# Patient Record
Sex: FEMALE | Race: NATIVE HAWAIIAN OR OTHER PACIFIC ISLANDER | URBAN - METROPOLITAN AREA
[De-identification: names, ages, dates, MRNs, and addresses within clinical notes are randomized per-mention and may not be internally consistent; named-entity substitution may affect disease eponyms.]

---

## 2024-05-28 ENCOUNTER — TELEPHONE (OUTPATIENT)
Dept: SURGERY | Facility: CLINIC | Age: 74
End: 2024-05-28

## 2024-05-28 NOTE — TELEPHONE ENCOUNTER
Patient is needing an appointment to establish care as she will be moving from [place] to [place]. Patient currently sees urology out there and does have a nephrostomy tube which needs to be replaced every two months. Her next change of the tube needs to be before July 24,2024. Please advise.

## 2024-05-30 NOTE — TELEPHONE ENCOUNTER
This encounter is now closed.     RN called daughter. Coming from [place]. Here in [place] to establish care with Urologist. Not available until mid July. RN offered 7/15 Monday at 2:30 with Dr Tracy.     PCP, Dr Kaur   Urologist, Dr Treviño   IR, Dr Hardy  See CareEverywhere    Instructions/directions given. She agreed to plans.

## 2024-07-22 NOTE — PROGRESS NOTES
Urology Clinic Note - New Patient    Referring Provider:  No referring provider defined for this encounter.     Primary Care Provider:  No primary care provider on file.     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    Daughter translating per preference.     Patient with complicated history of vesicovaginal fistula following chemoradiation hysterectomy for cervical cancer in 2006.  In 2019 she had a small bowel obstruction and underwent an enterotomy and ileocecectomy and later this was converted to a colostomy.  She also followed closely with nephrology at that time for CKD.  There are minimal urology notes available.    There is a inpatient note from 11/2022 which states that patient had a total pelvic exenteration.  Urologist was Dr. Treviño in [place].  There was extensive prior radiation noted at the time.  Very large vesicovaginal fistula.  She therefore had cystectomy and a left to right trans ureteral ureterostomy and a right cutaneous ureterostomy due to lack of safe bowel or colon for conduit.  Appears LEFT ureter was brought to skin- RIGHT ureter was plugged into the mid LEFT ureter.   Per notes, due to very high risk for stricture the plan was to maintain stent long-term.  Initially Dr. Treviño was managing this with a ureteral stent that he would change in his office under fluoroscopy into the left kidney.  Additionally, she has an extensive history of the left atrophied kidney, this was noted before her above surgery.  At some point, decision was made to change to a right-sided nephrostomy tube.    Per daughter, initially stent was in left kidney.   She also states that the left side did not have a NT placed as it was thought that any pressure from  here would be relieved via the right side.     She had an episode of sepsis and March 2024.  It was due to nephrostomy tube malfunction.  Last changed 5/24/24; due every 3mo per notes. Last was uncomplicated her notes.   She had a Lasix renogram on 6-.  This showed  Differential Function:  Left Kidney: 34%  Right Kidney: 66%    T 1/2 for Lasix Clearance, kidney:  Left Kidney: 18 minutes  Right Kidney: 11 minutes    T 1/2 for Lasix Clearance, collecting system:  Left Kidney: 36.7 minutes  Right Kidney: 8 minutes    Last RBUS 6/6;   RIGHt- no hydronephrosis   LEFT- mild hydronephrosis (improved from prior)    I talked to Dr. Ferrer on 7/25;   He states that the decision was made to only maintain RIGHT NT as most urine would reflux up the UU and into the nephrostomy tube from the left side.   No output from the cutenous ureterostomy now.   Family wanted to avoid any larger reconsruction; wanted to keep with chronic NT.   No issues today. Nt draining well. No s/s of infection.               History:     CKD  Cervical cancer  HLD   Anemia     Surgeries as above    Never smoker     No pertinent family history     Social History     Socioeconomic History    Marital status: Unknown     Social Determinants of Health     Food Insecurity: Low Risk  (3/24/2024)    Received from [facility]    IP Discharge Planning Review     In the past 12 months have you experienced difficulty with any of the following?: No difficulties reported   Transportation Needs: Low Risk  (3/24/2024)    Received from [facility]    IP Discharge Planning Review     In the past 12 months have you experienced difficulty with any of the following?: No difficulties reported       Medications (Active prior to today's visit):  Current Outpatient Medications   Medication Sig Dispense Refill    Ferrous Sulfate 325 (65 Fe) MG Oral Tab Take 0.2 tablets (65 mg total) by mouth  daily with breakfast.      loperamide 2 MG Oral Cap       sodium bicarbonate 650 MG Oral Tab Take 1 tablet (650 mg total) by mouth 2 (two) times daily.      SF 5000 PLUS 1.1 % Dental Cream Apply a thin ribbon of paste to a toothbrush. Brush thoroughly once daily for 2 minutes, preferably at bedtime. Spit the paste out after use and do not eat, drink, or rinse for 30 minutes. Do not swallow the paste. Use regular toothpaste in the morning.      tiZANidine 2 MG Oral Tab Take 1-2 tablets (2-4 mg total) by mouth 2 (two) times daily as needed.      zolpidem 10 MG Oral Tab       ascorbic acid 1000 MG Oral Tab Take 1 tablet (1,000 mg total) by mouth daily.      Cholecalciferol (VITAMIN D-1000 MAX ST) 25 MCG (1000 UT) Oral Tab Take 25 mcg by mouth daily.      Coenzyme Q10 100 MG Oral Cap Take 300 mg by mouth daily.      omega-3 fatty acids 1000 MG Oral Cap Take 1,000 mg by mouth daily.         Allergies:  Allergies   Allergen Reactions    Famotidine NAUSEA AND VOMITING         Review of Systems:   A comprehensive 10-point review of systems was completed.  Pertinent positives and negatives are noted in the the HPI.    Physical Exam:   CONSTITUTIONAL: Well developed, well nourished, in no acute distress  NEUROLOGIC: Alert and oriented  HEAD: Normocephalic, atraumatic  EYES: Sclera non-icteric  ENT: Hearing intact, moist mucous membranes  NECK: No obvious goiter or masses  RESPIRATORY: Normal respiratory effort  SKIN: No evident rashes  ABDOMEN: Soft, non-tender, non-distended,    GENITOURINARY: right NT with clear urine    Assessment & Plan:   Anmol Gonzalez is a 74 year old female with history of CKD, atrophic L kidney, referred for hx of VV fistula sp complex reconstruction (cystectomy, cutaneous ureterostomy with trans U-U) and right hydronephrosis managed by nephrostomy tube.    Daughter translating per preference.     I talked to family and Dr. Treviño; had a complicated pelvic exant for fistula and diversion was not possible  due to poor bowel quality from radiation; had left ureter brought to skin, right ureter plugged into left ureter; very narrow ureter and sharp angle of anastomosis per Dr. Treviño. Has been managed by stent initially then with NT in right kidney.   Will upload imaging; wanted to obtain baseline imaging here but family wants to avoid repeat. It appears there is some residual left sided hydro in atrophic kidney- however this is likely mostly draining via right nephrostomy tube due to U-U.   Patient has history of sepsis and has issues with NT malfunction and therefore has q2-3 mo exchanges. She is overdue. Order placed. Will also obtain antegrade studies at that time.   For now family is not interested in more complex reconstruction. Will discuss further at follow up.       Thank you for this consult.    I have personally reviewed all relevant medical records, labs, and imaging.    In total, 60 minutes were spent on this patient encounter (including chart review, patient history, physical, and counseling, documentation, and communication).  Extensive review of outside records and discussion with outside provider.        Raymundo Mccormick MD  Staff Urologist  [facility]  Office: [phone]

## 2024-07-24 ENCOUNTER — TELEPHONE (OUTPATIENT)
Dept: SURGERY | Facility: CLINIC | Age: 74
End: 2024-07-24

## 2024-07-24 ENCOUNTER — OFFICE VISIT (OUTPATIENT)
Dept: SURGERY | Facility: CLINIC | Age: 74
End: 2024-07-24

## 2024-07-24 DIAGNOSIS — N13.30 HYDRONEPHROSIS, RIGHT: Primary | ICD-10-CM

## 2024-07-24 PROCEDURE — 99205 OFFICE O/P NEW HI 60 MIN: CPT | Performed by: UROLOGY

## 2024-07-24 RX ORDER — PSYLLIUM HUSK 0.4 G
25 CAPSULE ORAL DAILY
COMMUNITY

## 2024-07-24 RX ORDER — TIZANIDINE 2 MG/1
1-2 TABLET ORAL 2 TIMES DAILY PRN
COMMUNITY
Start: 2024-06-26

## 2024-07-24 RX ORDER — FERROUS SULFATE 325(65) MG
65 TABLET ORAL
COMMUNITY
Start: 2023-11-01

## 2024-07-24 RX ORDER — SODIUM BICARBONATE 650 MG/1
650 TABLET ORAL 2 TIMES DAILY
COMMUNITY
Start: 2024-05-15 | End: 2025-05-16

## 2024-07-24 RX ORDER — UBIDECARENONE 100 MG
300 CAPSULE ORAL DAILY
COMMUNITY

## 2024-07-24 RX ORDER — CHLORAL HYDRATE 500 MG
1000 CAPSULE ORAL DAILY
COMMUNITY

## 2024-07-24 RX ORDER — ZOLPIDEM TARTRATE 10 MG/1
TABLET ORAL
COMMUNITY
Start: 2024-04-28

## 2024-07-24 RX ORDER — 1.1% SODIUM FLUORIDE PRESCRIPTION DENTAL CREAM 5 MG/G
CREAM DENTAL
COMMUNITY
Start: 2024-06-03

## 2024-07-24 RX ORDER — LOPERAMIDE HYDROCHLORIDE 2 MG/1
CAPSULE ORAL
COMMUNITY
Start: 2024-03-27

## 2024-07-24 NOTE — TELEPHONE ENCOUNTER
Per Colby with [facility] returning a call from Dr. Mccormick. Spoke to Pricila and connected to Dr. Mccormick. Thank you

## 2024-07-30 ENCOUNTER — OFFICE VISIT (OUTPATIENT)
Dept: NEPHROLOGY | Facility: CLINIC | Age: 74
End: 2024-07-30
Payer: MEDICARE

## 2024-07-30 VITALS — DIASTOLIC BLOOD PRESSURE: 68 MMHG | WEIGHT: 100.25 LBS | SYSTOLIC BLOOD PRESSURE: 130 MMHG

## 2024-07-30 DIAGNOSIS — N18.30 STAGE 3 CHRONIC KIDNEY DISEASE, UNSPECIFIED WHETHER STAGE 3A OR 3B CKD (HCC): Primary | ICD-10-CM

## 2024-07-30 DIAGNOSIS — N13.9 OBSTRUCTIVE UROPATHY: ICD-10-CM

## 2024-07-30 PROCEDURE — 99205 OFFICE O/P NEW HI 60 MIN: CPT | Performed by: INTERNAL MEDICINE

## 2024-07-30 NOTE — PROGRESS NOTES
Nephrology Consult Note    REASON FOR CONSULT: CKD 3    ASSESSMENT/PLAN:      1) CKD 3- due to obstructive uropathy resulting from multiple surgeries after undergoing hysterectomy for cervical cancer in 2009 followed by XRT, fistula formation, bowel obstruction, ileostomy and subsequent colostomy, cystectomy and ultimately percutaneous nephrostomy tube placement in 2023 (with L ureter joined to R). Recent nuc med renogram with split function 65/35. SCr < 1 mg/dl prior to developing obstruction last yr. No other risk factors for kidney disease.  Meds are benign without chronic analgesic or PPI use.  There are no signs of other systemic process such as vasculitis or autoimmune disease.  There is no family history of kidney disease.  She does not have HIV or hepatitis risk factors.  Her urine sediment is bland does not support an underlying glomerulopathy. PLAN- d/w pt / daughter at length. No further w/u indicated; no absolute indication for ACE-I / ARB. To focus on timely PNT exchange- currently q2 months (Dr. Mccormick + IR). Will check labs monthly for now and f/u in 6 months.    2) HTN- borderline HTN SBP 130s/70; no absolute indication for tx.    3) Hyperlipidemia- consider tx with statin- defer to PCP    4) Cervical CA s/p LINDSEY / XRT / chemo and complications as above    5) + colostomy + R PNT       HPI:   Anmol Gonzalez is a 74 year old female with   Chief Complaint   Patient presents with    Chronic Kidney Disease     Left atrophic kidney, right nephrostomy     No primary care provider on file.    Very pleasant 74-year-old female with a complex medical history having undergone hysterectomy, radiation and chemotherapy for cervical cancer diagnosed in 2009; this is complicated by multiple issues as noted above.  Was found to have obstructive uropathy last year after developing worsening kidney function which improved after percutaneous nephrostomy tube placement.    ROS:    Denies fever/chills  Denies wt  loss/gain  Denies HA or visual changes  Denies CP or palpitations  Denies SOB/cough/hemoptysis  Denies abd or flank pain  Denies N/V/D  Denies change in urinary habits or gross hematuria  Denies LE edema  Denies skin rashes/myalgias/arthralgias    PMH:  No past medical history on file.    PSH:  No past surgical history on file.    Medications (Active prior to today's visit):  Current Outpatient Medications   Medication Sig Dispense Refill    ascorbic acid 1000 MG Oral Tab Take 1 tablet (1,000 mg total) by mouth daily.      Cholecalciferol (VITAMIN D-1000 MAX ST) 25 MCG (1000 UT) Oral Tab Take 25 mcg by mouth daily.      Coenzyme Q10 100 MG Oral Cap Take 300 mg by mouth daily.      Ferrous Sulfate 325 (65 Fe) MG Oral Tab Take 0.2 tablets (65 mg total) by mouth daily with breakfast.      loperamide 2 MG Oral Cap       omega-3 fatty acids 1000 MG Oral Cap Take 1,000 mg by mouth daily.      sodium bicarbonate 650 MG Oral Tab Take 1 tablet (650 mg total) by mouth 2 (two) times daily.      SF 5000 PLUS 1.1 % Dental Cream Apply a thin ribbon of paste to a toothbrush. Brush thoroughly once daily for 2 minutes, preferably at bedtime. Spit the paste out after use and do not eat, drink, or rinse for 30 minutes. Do not swallow the paste. Use regular toothpaste in the morning.      tiZANidine 2 MG Oral Tab Take 1-2 tablets (2-4 mg total) by mouth 2 (two) times daily as needed.      zolpidem 10 MG Oral Tab          Allergies:  Allergies   Allergen Reactions    Famotidine NAUSEA AND VOMITING       Social History:  Social History     Socioeconomic History    Marital status: Unknown   Tobacco Use    Smoking status: Never    Smokeless tobacco: Never        Family History:  Denies family history of kidney disease.    PHYSICAL EXAM:   /68   Wt 100 lb 4 oz (45.5 kg)    Wt Readings from Last 3 Encounters:   07/30/24 100 lb 4 oz (45.5 kg)     General: Alert and oriented in no apparent distress.  HEENT: No scleral icterus,  MMM  Neck: Supple, no TRANG or thyromegaly  Cardiac: Regular rate and rhythm, S1, S2 normal, no murmur or rub  Lungs: Clear without wheezes, rales, rhonchi.    Abdomen: Soft, non-tender. + bowel sounds, no palpable organomegaly  Extremities: Without clubbing, cyanosis or edema.  Neurologic:  normal affect, cranial nerves grossly intact, moving all extremities  Skin: Warm and dry, no rashes        Leonela Kay MD  7/30/2024  4:22 PM

## 2024-07-31 VITALS — HEIGHT: 61 IN | WEIGHT: 100 LBS | BODY MASS INDEX: 18.88 KG/M2

## 2024-07-31 NOTE — PAT NURSING NOTE
PreOp Instructions     You are scheduled for: an Interventional Radiology Procedure     Date of Procedure: 08/08/24     Diet Instructions: Do not eat or drink anything after midnight     Medications: Medications you are allowed to take can be taken with a sip of water the morning of your procedure     Medications to Stop: Hold herbal supplements and vitamins for 5 days     Skin Prep: Shower with antibacterial soap using a clean washcloth, prior to procedure     Driving After Procedure: If sedation is given, you WILL NOT be able to drive home. You will need a responsible adult  to drive you home.     Discharge Teaching: Your nurse will give you specific instructions before discharge, Most people can resume normal activities in 2-3 days, Any questions, please call the physician's office

## 2024-08-08 ENCOUNTER — HOSPITAL ENCOUNTER (OUTPATIENT)
Dept: INTERVENTIONAL RADIOLOGY/VASCULAR | Facility: HOSPITAL | Age: 74
Discharge: HOME | End: 2024-08-08
Attending: UROLOGY
Payer: MEDICARE

## 2024-08-14 ENCOUNTER — HOSPITAL ENCOUNTER (OUTPATIENT)
Dept: INTERVENTIONAL RADIOLOGY/VASCULAR | Facility: HOSPITAL | Age: 74
Discharge: HOME | End: 2024-08-14
Attending: UROLOGY | Admitting: UROLOGY
Payer: MEDICARE

## 2024-08-14 VITALS
RESPIRATION RATE: 10 BRPM | SYSTOLIC BLOOD PRESSURE: 133 MMHG | DIASTOLIC BLOOD PRESSURE: 62 MMHG | TEMPERATURE: 97 F | HEART RATE: 65 BPM | OXYGEN SATURATION: 92 %

## 2024-08-14 DIAGNOSIS — N13.30 HYDRONEPHROSIS, RIGHT: ICD-10-CM

## 2024-08-14 LAB
ANION GAP SERPL CALC-SCNC: 4 MMOL/L (ref 0–18)
BUN BLD-MCNC: 28 MG/DL (ref 9–23)
CALCIUM BLD-MCNC: 9.9 MG/DL (ref 8.7–10.4)
CHLORIDE SERPL-SCNC: 108 MMOL/L (ref 98–112)
CO2 SERPL-SCNC: 26 MMOL/L (ref 21–32)
CREAT BLD-MCNC: 1.42 MG/DL
EGFRCR SERPLBLD CKD-EPI 2021: 39 ML/MIN/1.73M2 (ref 60–?)
GLUCOSE BLD-MCNC: 79 MG/DL (ref 70–99)
INR: 1 (ref 0.8–1.3)
OSMOLALITY SERPL CALC.SUM OF ELEC: 290 MOSM/KG (ref 275–295)
POTASSIUM SERPL-SCNC: 4.6 MMOL/L (ref 3.5–5.1)
SODIUM SERPL-SCNC: 138 MMOL/L (ref 136–145)

## 2024-08-14 PROCEDURE — 50435 EXCHANGE NEPHROSTOMY CATH: CPT | Performed by: RADIOLOGY

## 2024-08-14 PROCEDURE — 85610 PROTHROMBIN TIME: CPT | Performed by: RADIOLOGY

## 2024-08-14 PROCEDURE — 0T25X0Z CHANGE DRAINAGE DEVICE IN KIDNEY, EXTERNAL APPROACH: ICD-10-PCS | Performed by: RADIOLOGY

## 2024-08-14 PROCEDURE — 99152 MOD SED SAME PHYS/QHP 5/>YRS: CPT | Performed by: RADIOLOGY

## 2024-08-14 PROCEDURE — 80048 BASIC METABOLIC PNL TOTAL CA: CPT | Performed by: INTERNAL MEDICINE

## 2024-08-14 PROCEDURE — 50432 PLMT NEPHROSTOMY CATHETER: CPT | Performed by: RADIOLOGY

## 2024-08-14 RX ORDER — SODIUM CHLORIDE 9 MG/ML
INJECTION, SOLUTION INTRAVENOUS CONTINUOUS
Status: DISCONTINUED | OUTPATIENT
Start: 2024-08-14 | End: 2024-08-14

## 2024-08-14 RX ORDER — LEVOFLOXACIN 5 MG/ML
INJECTION, SOLUTION INTRAVENOUS
Status: COMPLETED
Start: 2024-08-14 | End: 2024-08-14

## 2024-08-14 RX ORDER — MIDAZOLAM HYDROCHLORIDE 1 MG/ML
INJECTION INTRAMUSCULAR; INTRAVENOUS
Status: DISCONTINUED
Start: 2024-08-14 | End: 2024-08-14 | Stop reason: WASHOUT

## 2024-08-14 RX ORDER — LIDOCAINE HYDROCHLORIDE 10 MG/ML
INJECTION, SOLUTION INFILTRATION; PERINEURAL
Status: COMPLETED
Start: 2024-08-14 | End: 2024-08-14

## 2024-08-14 NOTE — DISCHARGE INSTRUCTIONS
No shower for 3 days   On day 4 may sponge bathe or cover with plastic wrap and tape in place and then shower.  Monitor for any sign of infection : redness,fever, increased pain at puncture site.  Call  Office as directed.

## 2024-08-14 NOTE — PROGRESS NOTES
Pt arrived from IR lab. See vitals. Dressing c/d/I soft  1215 pt tolerated po well. Vss.discharge instructions reviewed. Dressing remains c/d/I neph tube draining cl yellow urine. IV d.cd and pt discharged at approx 1235 to home via wheelchair with daughter.

## 2024-08-14 NOTE — PROCEDURES
Scheduled exchange.  Prior care elsewhere.  Daughter reports problems with obstruction in past, with exchange every 2 months.  New 10F PNT.  Comp-none.  Mild sediment on catheter.

## 2024-08-15 NOTE — H&P
[facility]   part of [facility]   History & Physical    Anmol Gonzalez Patient Status:  Outpatient in a Bed    2/15/1950 MRN [id number]   Location [facility] INTERVENTIONAL SUITES Attending No att. providers found   Hosp Day # 0 PCP No primary care provider on file.     Admitting Diagnosis:   Referral for routine PNT exchange.    History of Present Illness:   Obstructive uropathy managed with chronic PNT.    History   Past Medical History:  Past Medical History:    Cancer (HCC)    cervicle    CKD (chronic kidney disease)       Past Surgical History:  Past Surgical History:   Procedure Laterality Date    Other surgical history      BLAADER REMOVAL    Part removal colon w end colostomy         Social History:  Social History     Tobacco Use    Smoking status: Never    Smokeless tobacco: Never   Substance Use Topics    Alcohol use: Never        Family History:  No family history on file.    Allergies/Medications:   Allergies:  Allergies   Allergen Reactions    Famotidine NAUSEA AND VOMITING       Medications:    Current Outpatient Medications:     ascorbic acid 1000 MG Oral Tab, Take 1 tablet (1,000 mg total) by mouth daily., Disp: , Rfl:     Cholecalciferol (VITAMIN D-1000 MAX ST) 25 MCG (1000 UT) Oral Tab, Take 25 mcg by mouth daily., Disp: , Rfl:     Coenzyme Q10 100 MG Oral Cap, Take 300 mg by mouth daily., Disp: , Rfl:     Ferrous Sulfate 325 (65 Fe) MG Oral Tab, Take 0.2 tablets (65 mg total) by mouth daily with breakfast., Disp: , Rfl:     omega-3 fatty acids 1000 MG Oral Cap, Take 1,000 mg by mouth daily., Disp: , Rfl:     sodium bicarbonate 650 MG Oral Tab, Take 1 tablet (650 mg total) by mouth 2 (two) times daily., Disp: , Rfl:     zolpidem 10 MG Oral Tab, , Disp: , Rfl:     loperamide 2 MG Oral Cap, , Disp: , Rfl:     SF 5000 PLUS 1.1 % Dental Cream, Apply a thin ribbon of paste to a toothbrush. Brush thoroughly once daily for 2 minutes, preferably at bedtime. Spit the paste out after use and do  not eat, drink, or rinse for 30 minutes. Do not swallow the paste. Use regular toothpaste in the morning., Disp: , Rfl:     tiZANidine 2 MG Oral Tab, Take 1-2 tablets (2-4 mg total) by mouth 2 (two) times daily as needed., Disp: , Rfl:     Physical Exam & Review of Systems:   Physical Exam:    /62   Pulse 65   Temp 97 °F (36.1 °C)   Resp 10   SpO2 92%     General: NAD  Neck: No JVD  Lungs: CTA bilat  Heart: RRR, S1, S2  Abdomen: Soft, NT/ND, BS+x4  Extremities: Warm, dry, no LE edema bilat  Pulses: 2+ bilat DP    Results:   Labs:  No results for input(s): \"RBC\", \"HGB\", \"HCT\", \"MCV\", \"MCH\", \"MCHC\", \"RDW\", \"NEPRELIM\", \"WBC\", \"PLT\" in the last 168 hours.  Recent Labs   Lab 08/14/24  1022   INR 1.0     Recent Labs   Lab 08/14/24  1000   GLU 79   BUN 28*   CREATSERUM 1.42*   CA 9.9      K 4.6      CO2 26.0       Assessment/Plan:   Impression: Referral for PNT exchange.     Recommendations: Outpatient procedure.    Byron Brian MD  8/15/2024  8:32 AM

## 2024-08-25 NOTE — PROGRESS NOTES
Urology Clinic Note - New Patient    Referring Provider:  No referring provider defined for this encounter.     Primary Care Provider:  No primary care provider on file.     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    Daughter translating per preference.     Patient with complicated history of vesicovaginal fistula following chemoradiation hysterectomy for cervical cancer in 2006.  In 2019 she had a small bowel obstruction and underwent an enterotomy and ileocecectomy and later this was converted to a colostomy.  She also followed closely with nephrology at that time for CKD.  There are minimal urology notes available.    There is a inpatient note from 11/2022 which states that patient had a total pelvic exenteration.  Urologist was Dr. Treviño in [place].  There was extensive prior radiation noted at the time.  Very large vesicovaginal fistula.  She therefore had cystectomy and a left to right trans ureteral ureterostomy and a right cutaneous ureterostomy due to lack of safe bowel or colon for conduit.  Appears LEFT ureter was brought to skin- RIGHT ureter was plugged into the mid LEFT ureter.   Per notes, due to very high risk for stricture the plan was to maintain stent long-term.  Initially Dr. Treviño was managing this with a ureteral stent that he would change in his office under fluoroscopy into the left kidney.  Additionally, she has an extensive history of the left atrophied kidney, this was noted before her above surgery.  At some point, decision was made to change to a right-sided nephrostomy tube.    Per daughter, initially stent was in left kidney.   She also states that the left side did not have a NT placed as it was thought that any pressure from  here would be relieved via the right side.     She had an episode of sepsis and March 2024.  It was due to nephrostomy tube malfunction.  Last changed 5/24/24; due every 3mo per notes. Last was uncomplicated her notes.   She had a Lasix renogram on 6-.  This showed  Differential Function:  Left Kidney: 34%  Right Kidney: 66%    T 1/2 for Lasix Clearance, kidney:  Left Kidney: 18 minutes  Right Kidney: 11 minutes    T 1/2 for Lasix Clearance, collecting system:  Left Kidney: 36.7 minutes  Right Kidney: 8 minutes    Last RBUS 6/6;   RIGHt- no hydronephrosis   LEFT- mild hydronephrosis (improved from prior)    I talked to Dr. Ferrer on 7/25;   He states that the decision was made to only maintain RIGHT NT as most urine would reflux up the UU and into the nephrostomy tube from the left side.   He states the UU was very tight,  cutaneous ureteroscopy was very narrow as well. He does believe that a re-do procedure would be possible in the future.   No output from the cutenous ureterostomy now.   Family wanted to avoid any larger reconstruction; wanted to keep with chronic NT.     At visit 7/24/24;   Decision made to replace the NT and perform antegrade studies ;     Is sp IR Xc on 8/14;     A gentle hand syringe nephrostogram was performed.  This demonstrated the existing    percutaneous nephrostomy 2 to be in satisfactory position, with the pigtail portion at the level of the renal pelvis.  Mild distension of the collecting system.   Subsequent over-the-wire exchange for the same size nephrostomy tube, 10 Latvian.  Confirmation of position with positive contrast, pigtail at renal pelvis level.  The system was secured at the skin exit site with 2 0 Ethilon suture.  It was connected to   a gravity bag.  Sterile dressings were placed.  Note made of mild sediment on the catheter.         She is doing well today. Seeing Dr Kay for CKD- labs all stable recently.          History:     CKD  Cervical cancer  HLD   Anemia      Surgeries as above    Never smoker     No pertinent family history     Social History     Socioeconomic History    Marital status: Unknown   Tobacco Use    Smoking status: Never    Smokeless tobacco: Never   Substance and Sexual Activity    Alcohol use: Never    Drug use: Never     Social Determinants of Health     Food Insecurity: Low Risk  (3/24/2024)    Received from [facility]    IP Discharge Planning Review     In the past 12 months have you experienced difficulty with any of the following?: No difficulties reported   Transportation Needs: Low Risk  (3/24/2024)    Received from [facility]    IP Discharge Planning Review     In the past 12 months have you experienced difficulty with any of the following?: No difficulties reported       Medications (Active prior to today's visit):  Current Outpatient Medications   Medication Sig Dispense Refill    ascorbic acid 1000 MG Oral Tab Take 1 tablet (1,000 mg total) by mouth daily.      Cholecalciferol (VITAMIN D-1000 MAX ST) 25 MCG (1000 UT) Oral Tab Take 25 mcg by mouth daily.      Coenzyme Q10 100 MG Oral Cap Take 300 mg by mouth daily.      Ferrous Sulfate 325 (65 Fe) MG Oral Tab Take 0.2 tablets (65 mg total) by mouth daily with breakfast.      loperamide 2 MG Oral Cap       omega-3 fatty acids 1000 MG Oral Cap Take 1,000 mg by mouth daily.      sodium bicarbonate 650 MG Oral Tab Take 1 tablet (650 mg total) by mouth 2 (two) times daily.      SF 5000 PLUS 1.1 % Dental Cream Apply a thin ribbon of paste to a toothbrush. Brush thoroughly once daily for 2 minutes, preferably at bedtime. Spit the paste out after use and do not eat, drink, or rinse for 30 minutes. Do not swallow the paste. Use regular toothpaste in the morning.      tiZANidine 2 MG Oral Tab Take 1-2 tablets (2-4 mg total) by mouth 2 (two) times daily as needed.      zolpidem 10 MG Oral Tab           Allergies:  Allergies   Allergen Reactions    Famotidine NAUSEA AND VOMITING         Review of Systems:   A comprehensive 10-point review of systems was completed.  Pertinent positives and negatives are noted in the the HPI.    Physical Exam:   CONSTITUTIONAL: Well developed, well nourished, in no acute distress  NEUROLOGIC: Alert and oriented  HEAD: Normocephalic, atraumatic  EYES: Sclera non-icteric  ENT: Hearing intact, moist mucous membranes  NECK: No obvious goiter or masses  RESPIRATORY: Normal respiratory effort  SKIN: No evident rashes  ABDOMEN: Soft, non-tender, non-distended,    GENITOURINARY: right NT with clear urine; previous ureterostomy site completely closed and scarred.    Assessment & Plan:   Anmol Gonzalez is a 74 year old female with history of CKD, atrophic L kidney, referred for hx of VV fistula sp complex reconstruction (cystectomy, cutaneous ureterostomy with trans U-U) and right hydronephrosis managed by nephrostomy tube.    No sp NT change with IR 2 weeks ago.  Daughter translating per preference.     I previously talked to Dr. Treviño; had a complicated pelvic exant for fistula and diversion was not possible due to poor bowel quality from radiation; had left ureter brought to skin, right ureter plugged into left ureter; very narrow ureter and sharp angle of anastomosis per Dr. Treviño. Has been managed by stent initially then with NT in right kidney.   I attempted to view imaging from disc that was brought in, was unable to view.  We discussed obtaining baseline imaging, given recent imaging at outside hospital family wants to avoid this for now.  Patient did have nephrostomy tube change recently with IR, there were some small amount of collecting system distention, no overt hydronephrosis on my review.  She is to continue Q 2 to 3-month exchanges given history of neph tube malfunction.  This is already being managed by IR.  Discussed options in detail.  I did discuss the option to see a  reconstructive urologist for consideration of revising surgery.  After detailed discussion patient and family are not interested in any kind of big surgeries at this time given her complicated history as above.  Continue follow-up with nephrology.  Return to see me in about 6 months, sooner if any issues.        Thank you for this consult.    I have personally reviewed all relevant medical records, labs, and imaging.    In total, 30 minutes were spent on this patient encounter (including chart review, patient history, physical, and counseling, documentation, and communication).        Raymundo Mccormick MD  Staff Urologist  [facility]  Office: [phone]

## 2024-08-26 ENCOUNTER — OFFICE VISIT (OUTPATIENT)
Dept: SURGERY | Facility: CLINIC | Age: 74
End: 2024-08-26

## 2024-08-26 DIAGNOSIS — N13.30 HYDRONEPHROSIS, RIGHT: Primary | ICD-10-CM

## 2024-08-26 PROCEDURE — 99214 OFFICE O/P EST MOD 30 MIN: CPT | Performed by: UROLOGY

## 2024-10-07 ENCOUNTER — OFFICE VISIT (OUTPATIENT)
Dept: INTERNAL MEDICINE CLINIC | Facility: CLINIC | Age: 74
End: 2024-10-07
Payer: MEDICARE

## 2024-10-07 VITALS
BODY MASS INDEX: 18.9 KG/M2 | WEIGHT: 98.81 LBS | HEIGHT: 60.5 IN | RESPIRATION RATE: 12 BRPM | TEMPERATURE: 98 F | DIASTOLIC BLOOD PRESSURE: 60 MMHG | HEART RATE: 64 BPM | SYSTOLIC BLOOD PRESSURE: 118 MMHG

## 2024-10-07 DIAGNOSIS — E78.5 HYPERLIPIDEMIA, UNSPECIFIED HYPERLIPIDEMIA TYPE: ICD-10-CM

## 2024-10-07 DIAGNOSIS — N13.30 HYDRONEPHROSIS, RIGHT: ICD-10-CM

## 2024-10-07 DIAGNOSIS — E78.41 ELEVATED LIPOPROTEIN A LEVEL: ICD-10-CM

## 2024-10-07 DIAGNOSIS — G47.00 INSOMNIA, UNSPECIFIED TYPE: ICD-10-CM

## 2024-10-07 DIAGNOSIS — R19.7 FREQUENT DIARRHEA: ICD-10-CM

## 2024-10-07 DIAGNOSIS — Z93.3 COLOSTOMY IN PLACE (HCC): ICD-10-CM

## 2024-10-07 DIAGNOSIS — N18.31 STAGE 3A CHRONIC KIDNEY DISEASE (HCC): ICD-10-CM

## 2024-10-07 DIAGNOSIS — R01.1 HEART MURMUR: ICD-10-CM

## 2024-10-07 DIAGNOSIS — R03.0 ELEVATED BLOOD PRESSURE READING: ICD-10-CM

## 2024-10-07 PROCEDURE — 99205 OFFICE O/P NEW HI 60 MIN: CPT | Performed by: INTERNAL MEDICINE

## 2024-10-07 RX ORDER — ZOLPIDEM TARTRATE 6.25 MG/1
6.25 TABLET, FILM COATED, EXTENDED RELEASE ORAL NIGHTLY PRN
Qty: 90 TABLET | Refills: 0 | Status: SHIPPED | OUTPATIENT
Start: 2024-10-07

## 2024-10-07 NOTE — PROGRESS NOTES
[facility]    CHIEF COMPLAINT:    Chief Complaint   Patient presents with    Other     Recently moved here 7/24. Discuss Elevated lipoprotein.   Also C/O frequent diarrhea several times per week. Has not established with GI.  Has colostomy and nephrostomy.          HISTORY OF PRESENT ILLNESS:  The patient is a 74 year old year old female who presents to establish care.   She has an extensive medical history.     Had cervical cancer in 2006. Had chemo and xrt at that time.   In 2019 she had sbo leading to enterotomy and ileocecectomy. She has a colostomy bag now.   She had an atrophic left kidney and has a history of vesicovaginal fistula s/p complex reconstruction and right hydronephrosis managed by nephrostomy tube. She sees urology Dr. Raymundo Mccormick for this. Tube needs q2-3 month exchange as she has had it malfunction in the past. This malfunction caused sepsis in 3/2024. The exchanges are done by IR.   Daughter is not sure where she can get nephrostomy supplies from.     She has ckd. She sees dr. Kay for this.     Hyperlipidemia: had been on a statin   She has elevated lipoprotein A and had an LDL of 133 so she was started on a statin. Her lipoprotein A level increased with that and she was taken off the statin. Also her LFTs were elevated after starting the statin, mild to moderate elevation. She is not currently taking the statin.     Bp had been elevated but she has been controlling it. Currently has been up and down per pt. Does not have readings with her. Not sure if her cuff is accurate.     Insomnia: she is on ambien 10mg at bedtime and also on tizanidine at bedtime.      She has had frequent diarrhea into her colostomy over the past few weeks. Takes loperamide but still with symptoms.     Recent labs reviewed.   Cbc with elevated mcv. No anemia.   Cmp with elevated creatinine and mildly elevated LFTs. This is when she was on the statin.       Past Medical History:   Past Medical History:     Cancer (HCC)    cervicle    CKD (chronic kidney disease)        Past Surgical History:   Past Surgical History:   Procedure Laterality Date    Other surgical history      BLAADER REMOVAL    Part removal colon w end colostomy         Current Medications:    Current Outpatient Medications   Medication Sig Dispense Refill    Ascorbic Acid (VITAMIN C OR) Take by mouth daily.      Zolpidem Tartrate ER (AMBIEN CR) 6.25 MG Oral Tab CR Take 1 tablet (6.25 mg total) by mouth nightly as needed for Sleep. 90 tablet 0    Coenzyme Q10 100 MG Oral Cap Take 300 mg by mouth daily.      Ferrous Sulfate 325 (65 Fe) MG Oral Tab Take 1 tablet (325 mg total) by mouth daily with breakfast.      loperamide 2 MG Oral Cap Take 1 capsule (2 mg total) by mouth as needed.      omega-3 fatty acids 1000 MG Oral Cap Take 1,000 mg by mouth daily.      sodium bicarbonate 650 MG Oral Tab Take 1 tablet (650 mg total) by mouth 2 (two) times daily.      SF 5000 PLUS 1.1 % Dental Cream Apply a thin ribbon of paste to a toothbrush. Brush thoroughly once daily for 2 minutes, preferably at bedtime. Spit the paste out after use and do not eat, drink, or rinse for 30 minutes. Do not swallow the paste. Use regular toothpaste in the morning.      tiZANidine 2 MG Oral Tab Take 1-2 tablets (2-4 mg total) by mouth 2 (two) times daily as needed.      zolpidem 10 MG Oral Tab Take 1 tablet (10 mg total) by mouth nightly.           Allergies:    Allergies as of 10/07/2024 - Review Complete 10/07/2024   Allergen Reaction Noted    Famotidine NAUSEA AND VOMITING 05/21/2019       SOCIAL HISTORY:    Social History     Socioeconomic History    Marital status: Unknown     Spouse name: Not on file    Number of children: Not on file    Years of education: Not on file    Highest education level: Not on file   Occupational History    Not on file   Tobacco Use    Smoking status: Never    Smokeless tobacco: Never   Vaping Use    Vaping status: Never Used   Substance and Sexual  Activity    Alcohol use: Never    Drug use: Never    Sexual activity: Not on file   Other Topics Concern    Not on file   Social History Narrative    Not on file     Social Determinants of Health     Financial Resource Strain: Not on file   Food Insecurity: Low Risk  (3/24/2024)    Received from [facility]    IP Discharge Planning Review     In the past 12 months have you experienced difficulty with any of the following?: No difficulties reported   Transportation Needs: Low Risk  (3/24/2024)    Received from [facility]    IP Discharge Planning Review     In the past 12 months have you experienced difficulty with any of the following?: No difficulties reported   Physical Activity: Not on file   Stress: Not on file   Social Connections: Not on file   Housing Stability: Not on file       FAMILY HISTORY:   Family History   Problem Relation Age of Onset    Stroke Father     Stroke Mother        REVIEW OF SYSTEMS:  GENERAL: feels well otherwise  SKIN: denies any unusual skin lesions  EYES:denies blurred vision or double vision  HEENT: denies nasal congestion, sinus pain or ST  LUNGS: denies shortness of breath with exertion  CARDIOVASCULAR: denies chest pain on exertion  GI: denies abdominal pain,denies heartburn  : denies dysuria, vaginal discharge or itching  MUSCULOSKELETAL: denies back pain  NEURO: denies headaches  PSYCHE: denies depression or anxiety  HEMATOLOGIC: denies hx of anemia  ENDOCRINE: denies thyroid history  ALL/ASTHMA: denies hx of allergy or asthma      PAIN ASSESSMENT (Patient reports Pain):No       FALL ASSESSMENT:    Falls in the last 12 months: No    FUNCTIONAL ASSESSMENT (Able to perform all ADLs):  Yes    BODY HABITUS AND NUTRITION STATUS:  Body mass index is 18.98 kg/m².    DEPRESSION ASSESSMENT:  denies depression    PHYSICAL EXAM:   /60 (BP Location: Left arm, Patient Position: Sitting, Cuff Size:  adult)   Pulse 64   Temp 97.7 °F (36.5 °C) (Temporal)   Resp 12   Ht 5' 0.5\" (1.537 m)   Wt 98 lb 12.8 oz (44.8 kg)   Breastfeeding No   BMI 18.98 kg/m²  Body mass index is 18.98 kg/m².   GENERAL: well developed, well nourished,in no apparent distress  HEENT: Oropharynx normal. No tonsillar enlargement or exudates.   EYES:perrla  NECK: no lad  LUNGS: clear to auscultation  CARDIO: normal s1 and s2. Had a III/VI loreto at the apex.   GI: colostomy in place. No abdominal discomfort.   MUSCULOSKELETAL:  no edema, muscle strength normal.   Back: nephrostomy present.     Labs:   No results found for: \"WBC\", \"HGB\", \"PLT\"   Lab Results   Component Value Date/Time    GLU 79 08/14/2024 10:00 AM     08/14/2024 10:00 AM    K 4.6 08/14/2024 10:00 AM     08/14/2024 10:00 AM    CO2 26.0 08/14/2024 10:00 AM    CREATSERUM 1.42 (H) 08/14/2024 10:00 AM    CA 9.9 08/14/2024 10:00 AM        No results found for: \"CHOLEST\", \"HDL\", \"TRIG\", \"LDL\", \"NONHDLC\"    No results found for: \"A1C\"   Vitamin D:    No results found for: \"VITD\"          ASSESSMENT AND PLAN:   1. Hydronephrosis, right  Pt with nephrostomy.   follow up with urology. She will need to check with urology re nephrostomy supplies.     2. Colostomy in place (HCC)  Will refer to surgeon to manage.   - Surgery Referral - In Network    3. Stage 3a chronic kidney disease (HCC)  follow up with dr. Kay.     4. Elevated lipoprotein A level  Recheck.   Currently off statin.   Given info for heart scan.   She had a negative stress test last year.   No active cardiac symptoms.   - Lipoprotein (A); Future    5. Hyperlipidemia, unspecified hyperlipidemia type  Not on statin.   Do labs.   She had mildly elevated LFTs after starting the statin.   - Comp Metabolic Panel (14) [E]; Future  - Lipid Panel [E]; Future    6. Elevated blood pressure reading  Not elevated here today.   She should bring in her bp cuff (daughter will bring it to her appt) and we can calibrate. If  accurate she should check bp at home for 2 weeks and send me readings.     7. Insomnia, unspecified type  Discussed she is on too much ambien and also on tizanidine at the same time. High risk meds and risk of falls is high.   Will change to zolpidem er at a lower dose.   Refer to sleep specialist for other treatments.   Advise not to take the tizanidine at the same time but she states she has been doing this for year and has not fallen and does not feel unsteady.   - Zolpidem Tartrate ER (AMBIEN CR) 6.25 MG Oral Tab CR; Take 1 tablet (6.25 mg total) by mouth nightly as needed for Sleep.  Dispense: 90 tablet; Refill: 0  - Pulmonary Referral - In Network    8. Frequent diarrhea  Refer to gi.   - EVALUATE & TREAT, GASTRO (INTERNAL)    9. Heart murmur  Echo was done. Reviewed. She had some right sided changes including RA dilatation, tricuspid regurg, elevated pulm pressures.   Chest xray in 3/2024 was abnormal. Will recheck.   Refer to cardiology for evaluation.  - XR CHEST PA + LAT CHEST (CPT=71046); Future  - [facility] CARDIOLOGY EXTERNAL        Return in about 1 month (around 11/7/2024) for follow up.    Extensive chart review done for visit on day of the visit.     60 minutes spent on provision of medical services today, including chart review, obtaining and reviewing history, performing medically appropriate examination, counseling and educating patient and/or caregiver, ordering testing , medications, referrals or procedures, my independent interpretation of results, communication of results to patient and /or caregiver, care coordination, and documentation of all of the above.       Jess Esquivel MD

## 2024-10-08 PROBLEM — N18.31 STAGE 3A CHRONIC KIDNEY DISEASE (HCC): Status: ACTIVE | Noted: 2024-10-08

## 2024-10-08 PROBLEM — G47.00 INSOMNIA: Status: ACTIVE | Noted: 2024-10-08

## 2024-10-08 PROBLEM — Z93.3 COLOSTOMY IN PLACE (HCC): Status: ACTIVE | Noted: 2024-10-08

## 2024-10-08 PROBLEM — R01.1 HEART MURMUR: Status: ACTIVE | Noted: 2024-10-08

## 2024-10-08 PROBLEM — R19.7 FREQUENT DIARRHEA: Status: ACTIVE | Noted: 2024-10-08

## 2024-10-08 PROBLEM — E78.41 ELEVATED LIPOPROTEIN A LEVEL: Status: ACTIVE | Noted: 2024-10-08

## 2024-10-08 PROBLEM — R03.0 ELEVATED BLOOD PRESSURE READING: Status: ACTIVE | Noted: 2024-10-08

## 2024-10-08 PROBLEM — N13.30 HYDRONEPHROSIS, RIGHT: Status: ACTIVE | Noted: 2024-10-08

## 2024-10-08 PROBLEM — E78.5 HYPERLIPIDEMIA: Status: ACTIVE | Noted: 2024-10-08

## 2024-10-09 ENCOUNTER — HOSPITAL ENCOUNTER (OUTPATIENT)
Dept: CT IMAGING | Age: 74
Discharge: HOME | End: 2024-10-09
Attending: INTERNAL MEDICINE

## 2024-10-09 DIAGNOSIS — R01.1 HEART MURMUR: ICD-10-CM

## 2024-10-09 DIAGNOSIS — Z13.6 SCREENING FOR CARDIOVASCULAR CONDITION: ICD-10-CM

## 2024-10-16 ENCOUNTER — LAB ENCOUNTER (OUTPATIENT)
Dept: LAB | Age: 74
End: 2024-10-16
Attending: INTERNAL MEDICINE
Payer: MEDICARE

## 2024-10-16 DIAGNOSIS — E78.5 HYPERLIPIDEMIA, UNSPECIFIED HYPERLIPIDEMIA TYPE: ICD-10-CM

## 2024-10-16 DIAGNOSIS — E78.41 ELEVATED LIPOPROTEIN A LEVEL: ICD-10-CM

## 2024-10-16 LAB
ALBUMIN SERPL-MCNC: 4.9 G/DL (ref 3.2–4.8)
ALBUMIN/GLOB SERPL: 1.8 {RATIO} (ref 1–2)
ALP LIVER SERPL-CCNC: 56 U/L
ALT SERPL-CCNC: 30 U/L
ANION GAP SERPL CALC-SCNC: 6 MMOL/L (ref 0–18)
AST SERPL-CCNC: 36 U/L (ref ?–34)
BILIRUB SERPL-MCNC: 1.3 MG/DL (ref 0.2–1.1)
BUN BLD-MCNC: 26 MG/DL (ref 9–23)
CALCIUM BLD-MCNC: 9.6 MG/DL (ref 8.7–10.4)
CHLORIDE SERPL-SCNC: 105 MMOL/L (ref 98–112)
CHOLEST SERPL-MCNC: 249 MG/DL (ref ?–200)
CO2 SERPL-SCNC: 24 MMOL/L (ref 21–32)
CREAT BLD-MCNC: 1.36 MG/DL
EGFRCR SERPLBLD CKD-EPI 2021: 41 ML/MIN/1.73M2 (ref 60–?)
FASTING PATIENT LIPID ANSWER: YES
FASTING STATUS PATIENT QL REPORTED: YES
GLOBULIN PLAS-MCNC: 2.8 G/DL (ref 2–3.5)
GLUCOSE BLD-MCNC: 72 MG/DL (ref 70–99)
HDLC SERPL-MCNC: 82 MG/DL (ref 40–59)
LDLC SERPL CALC-MCNC: 147 MG/DL (ref ?–100)
NONHDLC SERPL-MCNC: 167 MG/DL (ref ?–130)
OSMOLALITY SERPL CALC.SUM OF ELEC: 283 MOSM/KG (ref 275–295)
POTASSIUM SERPL-SCNC: 4.1 MMOL/L (ref 3.5–5.1)
PROT SERPL-MCNC: 7.7 G/DL (ref 5.7–8.2)
SODIUM SERPL-SCNC: 135 MMOL/L (ref 136–145)
TRIGL SERPL-MCNC: 114 MG/DL (ref 30–149)
VLDLC SERPL CALC-MCNC: 21 MG/DL (ref 0–30)

## 2024-10-16 PROCEDURE — 36415 COLL VENOUS BLD VENIPUNCTURE: CPT

## 2024-10-16 PROCEDURE — 83695 ASSAY OF LIPOPROTEIN(A): CPT

## 2024-10-16 PROCEDURE — 80053 COMPREHEN METABOLIC PANEL: CPT

## 2024-10-16 PROCEDURE — 80061 LIPID PANEL: CPT

## 2024-10-17 LAB — LIPOPROTEIN (A): 243.9 NMOL/L

## 2024-11-04 ENCOUNTER — OFFICE VISIT (OUTPATIENT)
Dept: INTERNAL MEDICINE CLINIC | Facility: CLINIC | Age: 74
End: 2024-11-04
Payer: MEDICARE

## 2024-11-04 ENCOUNTER — TELEPHONE (OUTPATIENT)
Dept: INTERNAL MEDICINE CLINIC | Facility: CLINIC | Age: 74
End: 2024-11-04

## 2024-11-04 VITALS
TEMPERATURE: 97 F | DIASTOLIC BLOOD PRESSURE: 60 MMHG | RESPIRATION RATE: 12 BRPM | SYSTOLIC BLOOD PRESSURE: 130 MMHG | WEIGHT: 102.5 LBS | HEIGHT: 60.5 IN | HEART RATE: 64 BPM | BODY MASS INDEX: 19.6 KG/M2

## 2024-11-04 DIAGNOSIS — E78.5 HYPERLIPIDEMIA, UNSPECIFIED HYPERLIPIDEMIA TYPE: ICD-10-CM

## 2024-11-04 DIAGNOSIS — E61.1 IRON DEFICIENCY: ICD-10-CM

## 2024-11-04 DIAGNOSIS — R91.8 PULMONARY NODULES: ICD-10-CM

## 2024-11-04 DIAGNOSIS — G47.00 INSOMNIA, UNSPECIFIED TYPE: ICD-10-CM

## 2024-11-04 DIAGNOSIS — R03.0 ELEVATED BLOOD PRESSURE READING: ICD-10-CM

## 2024-11-04 DIAGNOSIS — R93.1 AGATSTON CAC SCORE 200-399: ICD-10-CM

## 2024-11-04 DIAGNOSIS — E78.41 ELEVATED LIPOPROTEIN A LEVEL: ICD-10-CM

## 2024-11-04 PROCEDURE — G2211 COMPLEX E/M VISIT ADD ON: HCPCS | Performed by: INTERNAL MEDICINE

## 2024-11-04 PROCEDURE — 99214 OFFICE O/P EST MOD 30 MIN: CPT | Performed by: INTERNAL MEDICINE

## 2024-11-04 PROCEDURE — 1159F MED LIST DOCD IN RCRD: CPT | Performed by: INTERNAL MEDICINE

## 2024-11-04 RX ORDER — TRAZODONE HYDROCHLORIDE 50 MG/1
50 TABLET, FILM COATED ORAL NIGHTLY
Qty: 90 TABLET | Refills: 0 | Status: SHIPPED | OUTPATIENT
Start: 2024-11-04

## 2024-11-04 RX ORDER — FERROUS SULFATE 325(65) MG
325 TABLET ORAL
Qty: 90 TABLET | Refills: 0 | Status: SHIPPED | OUTPATIENT
Start: 2024-11-04

## 2024-11-04 RX ORDER — ROSUVASTATIN CALCIUM 5 MG/1
5 TABLET, COATED ORAL DAILY
COMMUNITY
Start: 2024-10-25

## 2024-11-04 NOTE — TELEPHONE ENCOUNTER
Faxed auth to cardio, Dr. Rawls at [facility] requesting consult note and copy of any testing.  [phone]. Confirmation received.

## 2024-11-04 NOTE — PROGRESS NOTES
[facility]    CHIEF COMPLAINT:    Chief Complaint   Patient presents with    Follow - Up     3/23/24-neg FIT.          HISTORY OF PRESENT ILLNESS:  here for follow up from last visit.   History obtained from daughter today.     Elevated blood pressure: still with elevated readings at home. More so in the pm but also in the am.   Saw cardiology but was told not concerning for her age per pt's daughter.   In the past 2 days she has has had readings in the 170s systolic.   No chest pain, no shortness of breath.     Hyperlipidemia: started on statin by cardiology. On crestor.     Elevated lipoprotein A level: still elevated. She had a heart scan which showed a score of 270. She has a cardiac PET scheduled per pt's daughter.   Cardiology notes not available to me today. Saw Dr. Almodovar at [facility]. Release signed.     Insomnia: not taking tizanidine now prior to bed.   She is taking the lower dose of zolpidem, works about 50% of the time. She would like to try something else.     Abnormal echo: saw cardiology. Will obtain notes.     She has not seen gi yet for diarrhea.   She has not seen gen surg yet for colostomy care.     She is due for a nephrostomy tube change soon. Has not found out about medical supply stores covered by insurance where supplies for her nephrostomy can be sent.     Heart scan overread showed pulmonary nodules. Per daughter she had these 10 years ago when she had another scan but not done here, done [place] country. No report available.     history of iron deficiency. Last labs were in 6/2024. Taking iron supplements bid.     REVIEW OF SYSTEMS:  See HPI    Current Medications:    Current Outpatient Medications   Medication Sig Dispense Refill    rosuvastatin 5 MG Oral Tab Take 1 tablet (5 mg total) by mouth daily.      traZODone 50 MG Oral Tab Take 1 tablet (50 mg total) by mouth nightly. 90 tablet 0    Ferrous Sulfate 325 (65 Fe) MG Oral Tab Take 1 tablet (325 mg total) by mouth daily with  breakfast. 90 tablet 0    Ascorbic Acid (VITAMIN C OR) Take by mouth daily.      Zolpidem Tartrate ER (AMBIEN CR) 6.25 MG Oral Tab CR Take 1 tablet (6.25 mg total) by mouth nightly as needed for Sleep. 90 tablet 0    Coenzyme Q10 100 MG Oral Cap Take 300 mg by mouth daily.      loperamide 2 MG Oral Cap Take 1 capsule (2 mg total) by mouth as needed.      omega-3 fatty acids 1000 MG Oral Cap Take 1,000 mg by mouth daily.      sodium bicarbonate 650 MG Oral Tab Take 1 tablet (650 mg total) by mouth 2 (two) times daily.      SF 5000 PLUS 1.1 % Dental Cream Apply a thin ribbon of paste to a toothbrush. Brush thoroughly once daily for 2 minutes, preferably at bedtime. Spit the paste out after use and do not eat, drink, or rinse for 30 minutes. Do not swallow the paste. Use regular toothpaste in the morning.      tiZANidine 2 MG Oral Tab Take 1-2 tablets (2-4 mg total) by mouth 2 (two) times daily as needed.         PAST MEDICAL, SOCIAL, AND FAMILY HISTORY:  Tobacco:    History   Smoking Status    Never   Smokeless Tobacco    Never       PHYSICAL EXAM:  /60 (BP Location: Left arm, Patient Position: Sitting, Cuff Size: adult)   Pulse 64   Temp 97.3 °F (36.3 °C) (Temporal)   Resp 12   Ht 5' 0.5\" (1.537 m)   Wt 102 lb 8 oz (46.5 kg)   Breastfeeding No   BMI 19.69 kg/m²    GENERAL: well developed, well nourished,in no apparent distress  LUNGS: clear to auscultation  CARDIO: RRR without murmur  GI: good BS's,no masses, HSM or tenderness  MUSCULOSKELETAL:  no edema, muscle strength normal.     DATA:  Results for orders placed or performed in visit on 10/16/24   Comp Metabolic Panel (14) [E]    Collection Time: 10/16/24  1:33 PM   Result Value Ref Range    Glucose 72 70 - 99 mg/dL    Sodium 135 (L) 136 - 145 mmol/L    Potassium 4.1 3.5 - 5.1 mmol/L    Chloride 105 98 - 112 mmol/L    CO2 24.0 21.0 - 32.0 mmol/L    Anion Gap 6 0 - 18 mmol/L    BUN 26 (H) 9 - 23 mg/dL    Creatinine 1.36 (H) 0.55 - 1.02 mg/dL     Calcium, Total 9.6 8.7 - 10.4 mg/dL    Calculated Osmolality 283 275 - 295 mOsm/kg    eGFR-Cr 41 (L) >=60 mL/min/1.73m2    AST 36 (H) <34 U/L    ALT 30 10 - 49 U/L    Alkaline Phosphatase 56 55 - 142 U/L    Bilirubin, Total 1.3 (H) 0.2 - 1.1 mg/dL    Total Protein 7.7 5.7 - 8.2 g/dL    Albumin 4.9 (H) 3.2 - 4.8 g/dL    Globulin  2.8 2.0 - 3.5 g/dL    A/G Ratio 1.8 1.0 - 2.0    Patient Fasting for CMP? Yes    Lipid Panel [E]    Collection Time: 10/16/24  1:33 PM   Result Value Ref Range    Cholesterol, Total 249 (H) <200 mg/dL    HDL Cholesterol 82 (H) 40 - 59 mg/dL    Triglycerides 114 30 - 149 mg/dL    LDL Cholesterol 147 (H) <100 mg/dL    VLDL 21 0 - 30 mg/dL    Non HDL Chol 167 (H) <130 mg/dL    Patient Fasting for Lipid? Yes    Lipoprotein (A)    Collection Time: 10/16/24  1:33 PM   Result Value Ref Range    Lipoprotein (a) 243.9 (H) <75.0 nmol/L            ASSESSMENT AND PLAN:  1. Elevated blood pressure reading  Will obtain records from cardiology to see if there is any insight into why he did not want to treat the BP.   If not will start on meds. I will reach out to pt once I have results.     2. Hyperlipidemia, unspecified hyperlipidemia type  Continue statin.     3. Elevated lipoprotein A level  follow up with cardiology.     4. Agatston CAC score 200-399  follow up with cardiology.     5. Insomnia, unspecified type  Not enough relief on zolpidem.   Can try trazodone.   - traZODone 50 MG Oral Tab; Take 1 tablet (50 mg total) by mouth nightly.  Dispense: 90 tablet; Refill: 0    6. Pulmonary nodules  Repeat ct chest in a year.   - CT CHEST (CPT=71211); Future    7. Iron deficiency  Continue iron supplement once a day.   Await labs. Okay to do prior to next visit.    - Ferrous Sulfate 325 (65 Fe) MG Oral Tab; Take 1 tablet (325 mg total) by mouth daily with breakfast.  Dispense: 90 tablet; Refill: 0        Return in about 2 months (around 1/4/2025) for med check.  If started on bp meds rtc in a month for bp  check.       Jess Esquivel MD

## 2024-11-07 ENCOUNTER — TELEPHONE (OUTPATIENT)
Dept: INTERNAL MEDICINE CLINIC | Facility: CLINIC | Age: 74
End: 2024-11-07

## 2024-11-07 NOTE — TELEPHONE ENCOUNTER
Reviewed cardiology notes from 0/25/24. They commented on her bp as below:   Her bp is slightly well controlled based on her logs. Blood pressure readings usually in the 130s and 140s. Would recommend lifestyle modification with low salt diet and exercise at this time. She will continue to keep a log and will inform us if she has worsening hypertension. Follow up in 4 weeks.     Pt's bp's were higher based on the logs she brought me. Recommend she reach out to cardiology about the higher blood pressures as she likely needs to be on medication for this. She is to see cardiology end of this month. She should make an appt with them accordingly.     Please inform pt's daughter.

## 2024-11-07 NOTE — TELEPHONE ENCOUNTER
Daughter on david aware. Verbalizes understanding.   Daughter asking if we still have the log as they dont have a copy to share with cardiology. Do you still happen to have the log so we can make a copy for patient? Thanks!

## 2024-11-08 NOTE — TELEPHONE ENCOUNTER
Original up front for pickup. Spoke to daughter on david. Aware.  Copy given back to provider.    Advised if they haven't already done so yesterday to contact cards today. Verbalizes understanding.

## 2024-11-12 NOTE — PAT NURSING NOTE
Spoke with daughter Christianne - no changes since last visit with regard to medications and/or medical history.      PreOp Instructions     You are scheduled for: an Interventional Radiology Procedure     Date of Procedure: 11/14/24     Diet Instructions: Do not eat or drink anything after midnight including gum, mints, candy, etc the night before your procedure.     Medications: Medications you are allowed to take can be taken with a sip of water the morning of your procedure.     Medications to Stop: Do not take any herbal supplements and vitamins the morning of your procedure.     Skin Prep : Shower with antibacterial soap using a clean washcloth, prior to procedure. Once dried off, no lotions/powders/creams/ointments, etc.     Arrival Time: Your arrival time is at 10:30 AM. Your procedure will start at 11:30 AM.    Driving After Procedure: Sedation will be given so you WILL NOT be able to drive home. You will need a responsible adult  to drive you home. You can NOT take uber or taxi unless approved by your physician in advance.     Discharge Teaching: Your nurse will give you specific instructions before discharge, Any questions, please call the physician's office

## 2024-11-13 ENCOUNTER — TELEPHONE (OUTPATIENT)
Dept: INTERNAL MEDICINE CLINIC | Facility: CLINIC | Age: 74
End: 2024-11-13

## 2024-11-13 NOTE — TELEPHONE ENCOUNTER
Incoming (mail or fax):  fax  Received from:  [facility]  Documentation given to:  results      myocardial perfusion

## 2024-11-14 ENCOUNTER — HOSPITAL ENCOUNTER (OUTPATIENT)
Dept: INTERVENTIONAL RADIOLOGY/VASCULAR | Facility: HOSPITAL | Age: 74
Discharge: HOME | End: 2024-11-14
Attending: UROLOGY | Admitting: UROLOGY
Payer: MEDICARE

## 2024-11-14 VITALS
DIASTOLIC BLOOD PRESSURE: 60 MMHG | SYSTOLIC BLOOD PRESSURE: 129 MMHG | WEIGHT: 102 LBS | TEMPERATURE: 98 F | HEART RATE: 65 BPM | RESPIRATION RATE: 13 BRPM | BODY MASS INDEX: 20.03 KG/M2 | HEIGHT: 60 IN | OXYGEN SATURATION: 97 %

## 2024-11-14 DIAGNOSIS — N13.30 HYDRONEPHROSIS: ICD-10-CM

## 2024-11-14 LAB — INR: 1 (ref 0.8–1.3)

## 2024-11-14 PROCEDURE — 0T25X0Z CHANGE DRAINAGE DEVICE IN KIDNEY, EXTERNAL APPROACH: ICD-10-PCS | Performed by: RADIOLOGY

## 2024-11-14 PROCEDURE — 85610 PROTHROMBIN TIME: CPT | Performed by: RADIOLOGY

## 2024-11-14 PROCEDURE — 50435 EXCHANGE NEPHROSTOMY CATH: CPT | Performed by: RADIOLOGY

## 2024-11-14 RX ORDER — IOPAMIDOL 612 MG/ML
30 INJECTION, SOLUTION INTRATHECAL
Status: COMPLETED | OUTPATIENT
Start: 2024-11-14 | End: 2024-11-14

## 2024-11-14 RX ORDER — LIDOCAINE HYDROCHLORIDE 10 MG/ML
INJECTION, SOLUTION INFILTRATION; PERINEURAL
Status: COMPLETED
Start: 2024-11-14 | End: 2024-11-14

## 2024-11-14 RX ORDER — LEVOFLOXACIN 5 MG/ML
INJECTION, SOLUTION INTRAVENOUS
Status: COMPLETED
Start: 2024-11-14 | End: 2024-11-14

## 2024-11-14 RX ORDER — SODIUM CHLORIDE 9 MG/ML
INJECTION, SOLUTION INTRAVENOUS CONTINUOUS
Status: DISCONTINUED | OUTPATIENT
Start: 2024-11-14 | End: 2024-11-14

## 2024-11-14 RX ADMIN — IOPAMIDOL 15 ML: 612 INJECTION, SOLUTION INTRATHECAL at 12:19:00

## 2024-11-14 NOTE — PROGRESS NOTES
Pt s/p R neph tube exchange. Post procedure dressing to site remained CDI. VSS. Denied pain. Tolerated PO intake. IV d/c'd. Discharge instructions given to daughter (providing translation for pt) -verbalized understanding. Pt to lobby via WC and daughter to drive home.

## 2024-11-14 NOTE — TELEPHONE ENCOUNTER
Rec myocardial perfusion imaging with pharmacologic stress one day protocol from [facility]. Placed in provider bin for review. Thanks!

## 2024-11-14 NOTE — PROCEDURES
New 10F PNT.  Comp-none.  Daughter reports recent blockage of catheter.  2 month exchange recommended.

## 2024-11-14 NOTE — DISCHARGE INSTRUCTIONS
Keep the nephrostomy dressing(s) clean and dry- Do NOT get it wet.  When you shower you must cover the site and not get it wet.  Do not have the shower water hitting the area.     Change the dressing to your back when it becomes saturated/as needed.  Wash your hands with soap and water for 20 seconds before you handle the dressing.     Empty the nephrostomy bag as needed.      Change the nephrostomy bag once a month.     Notify the doctor of any signs of infection including a temperature greater than 101 degrees, chills, redness, swelling, drainage/foul odor from incision site.     No driving for 24 hours    No alcohol for 24 hours.     In 3 months you will need to get your nephrostomy tube exchanged- this appointment will be set up with your nephrologist.     Do not make any personal/business decisions and/or sign any legal documents for the next 24 hours.

## 2024-11-19 NOTE — H&P
[facility]   part of [facility]   History & Physical    Anmol Gonzalez Patient Status:  Outpatient    2/15/1950 MRN [id number]   Location [facility] INTERVENTIONAL SUITES Attending No att. providers found   Hosp Day # 0 PCP Jess Esquivel MD     Admitting Diagnosis:   Referral for PNT exchange.    History of Present Illness:   H/o obstructive uropathy with failed stents.  Chronic PNT.    History   Past Medical History:  Past Medical History:    Cancer (HCC)    cervicle    CKD (chronic kidney disease)       Past Surgical History:  Past Surgical History:   Procedure Laterality Date    Other surgical history      BLAADER REMOVAL    Part removal colon w end colostomy         Social History:  Social History     Tobacco Use    Smoking status: Never    Smokeless tobacco: Never   Substance Use Topics    Alcohol use: Never        Family History:  Family History   Problem Relation Age of Onset    Stroke Father     Stroke Mother        Allergies/Medications:   Allergies:  Allergies[1]    Medications:    Current Outpatient Medications:     rosuvastatin 5 MG Oral Tab, Take 1 tablet (5 mg total) by mouth daily., Disp: , Rfl:     traZODone 50 MG Oral Tab, Take 1 tablet (50 mg total) by mouth nightly., Disp: 90 tablet, Rfl: 0    Ferrous Sulfate 325 (65 Fe) MG Oral Tab, Take 1 tablet (325 mg total) by mouth daily with breakfast., Disp: 90 tablet, Rfl: 0    Ascorbic Acid (VITAMIN C OR), Take by mouth daily., Disp: , Rfl:     Zolpidem Tartrate ER (AMBIEN CR) 6.25 MG Oral Tab CR, Take 1 tablet (6.25 mg total) by mouth nightly as needed for Sleep., Disp: 90 tablet, Rfl: 0    Coenzyme Q10 100 MG Oral Cap, Take 300 mg by mouth daily., Disp: , Rfl:     omega-3 fatty acids 1000 MG Oral Cap, Take 1,000 mg by mouth daily., Disp: , Rfl:     sodium bicarbonate 650 MG Oral Tab, Take 1 tablet (650 mg total) by mouth 2 (two) times daily., Disp: , Rfl:     tiZANidine 2 MG Oral Tab, Take 1-2 tablets (2-4 mg total) by mouth 2 (two) times  daily as needed., Disp: , Rfl:     loperamide 2 MG Oral Cap, Take 1 capsule (2 mg total) by mouth as needed., Disp: , Rfl:     SF 5000 PLUS 1.1 % Dental Cream, Apply a thin ribbon of paste to a toothbrush. Brush thoroughly once daily for 2 minutes, preferably at bedtime. Spit the paste out after use and do not eat, drink, or rinse for 30 minutes. Do not swallow the paste. Use regular toothpaste in the morning., Disp: , Rfl:     Physical Exam & Review of Systems:   Physical Exam:    /60   Pulse 65   Temp 97.8 °F (36.6 °C) (Tympanic)   Resp 13   Ht 60\"   Wt 102 lb   SpO2 97%   BMI 19.92 kg/m²     General: NAD  Neck: No JVD  Lungs: CTA bilat  Heart: RRR, S1, S2  Abdomen: Soft, NT/ND, BS+x4  Extremities: Warm, dry, no LE edema bilat  Pulses: 2+ bilat DP    Results:   Labs:  No results for input(s): \"RBC\", \"HGB\", \"HCT\", \"MCV\", \"MCH\", \"MCHC\", \"RDW\", \"NEPRELIM\", \"WBC\", \"PLT\" in the last 168 hours.  Recent Labs   Lab 11/14/24  1155   INR 1.0     No results for input(s): \"GLU\", \"BUN\", \"CREATSERUM\", \"GFRAA\", \"GFRNAA\", \"CA\", \"NA\", \"K\", \"CL\", \"CO2\" in the last 168 hours.    Assessment/Plan:   Impression: Referral for PNT exchange.    Recommendations: Outpatient procedure.    Byron Brian MD  11/19/2024  4:08 PM           [1]   Allergies  Allergen Reactions    Famotidine NAUSEA AND VOMITING

## 2024-11-27 ENCOUNTER — TELEPHONE (OUTPATIENT)
Dept: INTERNAL MEDICINE CLINIC | Facility: CLINIC | Age: 74
End: 2024-11-27

## 2024-11-27 NOTE — TELEPHONE ENCOUNTER
Incoming (mail or fax):  fax  Received from:  [facility]  Documentation given to:  Triage incoming    Medical supplies for ostomy

## 2024-11-27 NOTE — TELEPHONE ENCOUNTER
I signed this for now but she was given a referral to see the general surgeon for colostomy care. Further supplies to come from them, she needs to make an appt. Please inform pt's daughter.

## 2024-11-27 NOTE — TELEPHONE ENCOUNTER
All documentation faxed . Conformation received     Send to scanning     left detailed message for the pt's daughter .

## 2024-12-01 ENCOUNTER — APPOINTMENT (OUTPATIENT)
Dept: CT IMAGING | Facility: HOSPITAL | Age: 74
End: 2024-12-01
Attending: EMERGENCY MEDICINE
Payer: MEDICARE

## 2024-12-01 ENCOUNTER — APPOINTMENT (OUTPATIENT)
Dept: GENERAL RADIOLOGY | Facility: HOSPITAL | Age: 74
End: 2024-12-01
Attending: EMERGENCY MEDICINE
Payer: MEDICARE

## 2024-12-01 ENCOUNTER — HOSPITAL ENCOUNTER (INPATIENT)
Facility: HOSPITAL | Age: 74
LOS: 5 days | Discharge: HOME | End: 2024-12-06
Attending: EMERGENCY MEDICINE | Admitting: HOSPITALIST
Payer: MEDICARE

## 2024-12-01 DIAGNOSIS — K56.609 SBO (SMALL BOWEL OBSTRUCTION) (HCC): Primary | ICD-10-CM

## 2024-12-01 LAB
ALBUMIN SERPL-MCNC: 5.3 G/DL (ref 3.2–4.8)
ALBUMIN/GLOB SERPL: 1.8 {RATIO} (ref 1–2)
ALP LIVER SERPL-CCNC: 63 U/L
ALT SERPL-CCNC: 102 U/L
ANION GAP SERPL CALC-SCNC: 14 MMOL/L (ref 0–18)
AST SERPL-CCNC: 51 U/L (ref ?–34)
BASOPHILS # BLD AUTO: 0.01 X10(3) UL (ref 0–0.2)
BASOPHILS NFR BLD AUTO: 0.1 %
BILIRUB SERPL-MCNC: 1.8 MG/DL (ref 0.2–1.1)
BUN BLD-MCNC: 17 MG/DL (ref 9–23)
CALCIUM BLD-MCNC: 11 MG/DL (ref 8.7–10.4)
CHLORIDE SERPL-SCNC: 95 MMOL/L (ref 98–112)
CO2 SERPL-SCNC: 26 MMOL/L (ref 21–32)
CREAT BLD-MCNC: 1.62 MG/DL
EGFRCR SERPLBLD CKD-EPI 2021: 34 ML/MIN/1.73M2 (ref 60–?)
EOSINOPHIL # BLD AUTO: 0 X10(3) UL (ref 0–0.7)
EOSINOPHIL NFR BLD AUTO: 0 %
ERYTHROCYTE [DISTWIDTH] IN BLOOD BY AUTOMATED COUNT: 12.8 %
GLOBULIN PLAS-MCNC: 2.9 G/DL (ref 2–3.5)
GLUCOSE BLD-MCNC: 135 MG/DL (ref 70–99)
HCT VFR BLD AUTO: 40.3 %
HGB BLD-MCNC: 14.3 G/DL
IMM GRANULOCYTES # BLD AUTO: 0.01 X10(3) UL (ref 0–1)
IMM GRANULOCYTES NFR BLD: 0.1 %
LACTATE SERPL-SCNC: 1.7 MMOL/L (ref 0.5–2)
LACTATE SERPL-SCNC: 2.4 MMOL/L (ref 0.5–2)
LIPASE SERPL-CCNC: 32 U/L (ref 12–53)
LYMPHOCYTES # BLD AUTO: 0.51 X10(3) UL (ref 1–4)
LYMPHOCYTES NFR BLD AUTO: 5.7 %
MCH RBC QN AUTO: 32.1 PG (ref 26–34)
MCHC RBC AUTO-ENTMCNC: 35.5 G/DL (ref 31–37)
MCV RBC AUTO: 90.4 FL
MONOCYTES # BLD AUTO: 0.43 X10(3) UL (ref 0.1–1)
MONOCYTES NFR BLD AUTO: 4.8 %
NEUTROPHILS # BLD AUTO: 7.91 X10 (3) UL (ref 1.5–7.7)
NEUTROPHILS # BLD AUTO: 7.91 X10(3) UL (ref 1.5–7.7)
NEUTROPHILS NFR BLD AUTO: 89.3 %
OSMOLALITY SERPL CALC.SUM OF ELEC: 284 MOSM/KG (ref 275–295)
PLATELET # BLD AUTO: 230 10(3)UL (ref 150–450)
POTASSIUM SERPL-SCNC: 3.9 MMOL/L (ref 3.5–5.1)
PROT SERPL-MCNC: 8.2 G/DL (ref 5.7–8.2)
RBC # BLD AUTO: 4.46 X10(6)UL
SODIUM SERPL-SCNC: 135 MMOL/L (ref 136–145)
WBC # BLD AUTO: 8.9 X10(3) UL (ref 4–11)

## 2024-12-01 PROCEDURE — 71045 X-RAY EXAM CHEST 1 VIEW: CPT | Performed by: EMERGENCY MEDICINE

## 2024-12-01 PROCEDURE — 99223 1ST HOSP IP/OBS HIGH 75: CPT | Performed by: STUDENT IN AN ORGANIZED HEALTH CARE EDUCATION/TRAINING PROGRAM

## 2024-12-01 PROCEDURE — 0D9670Z DRAINAGE OF STOMACH WITH DRAINAGE DEVICE, VIA NATURAL OR ARTIFICIAL OPENING: ICD-10-PCS | Performed by: EMERGENCY MEDICINE

## 2024-12-01 PROCEDURE — 74176 CT ABD & PELVIS W/O CONTRAST: CPT | Performed by: EMERGENCY MEDICINE

## 2024-12-01 RX ORDER — ECHINACEA PURPUREA EXTRACT 125 MG
1 TABLET ORAL
Status: DISCONTINUED | OUTPATIENT
Start: 2024-12-01 | End: 2024-12-06

## 2024-12-01 RX ORDER — LIDOCAINE HYDROCHLORIDE 20 MG/ML
10 SOLUTION OROPHARYNGEAL ONCE
Status: COMPLETED | OUTPATIENT
Start: 2024-12-01 | End: 2024-12-01

## 2024-12-01 RX ORDER — HYDROMORPHONE HYDROCHLORIDE 1 MG/ML
0.4 INJECTION, SOLUTION INTRAMUSCULAR; INTRAVENOUS; SUBCUTANEOUS EVERY 4 HOURS PRN
Status: DISCONTINUED | OUTPATIENT
Start: 2024-12-01 | End: 2024-12-06

## 2024-12-01 RX ORDER — SODIUM CHLORIDE 9 MG/ML
INJECTION, SOLUTION INTRAVENOUS CONTINUOUS
Status: DISCONTINUED | OUTPATIENT
Start: 2024-12-01 | End: 2024-12-04

## 2024-12-01 RX ORDER — ONDANSETRON 4 MG/1
TABLET, ORALLY DISINTEGRATING ORAL
Status: DISPENSED
Start: 2024-12-01 | End: 2024-12-02

## 2024-12-01 RX ORDER — ONDANSETRON 4 MG/1
4 TABLET, ORALLY DISINTEGRATING ORAL ONCE
Status: COMPLETED | OUTPATIENT
Start: 2024-12-01 | End: 2024-12-01

## 2024-12-01 RX ORDER — ENOXAPARIN SODIUM 100 MG/ML
30 INJECTION SUBCUTANEOUS DAILY
Status: DISCONTINUED | OUTPATIENT
Start: 2024-12-02 | End: 2024-12-06

## 2024-12-01 RX ORDER — POLYETHYLENE GLYCOL 3350 17 G/17G
17 POWDER, FOR SOLUTION ORAL DAILY
COMMUNITY
End: 2024-12-11

## 2024-12-01 RX ORDER — ONDANSETRON 2 MG/ML
4 INJECTION INTRAMUSCULAR; INTRAVENOUS EVERY 6 HOURS PRN
Status: DISCONTINUED | OUTPATIENT
Start: 2024-12-01 | End: 2024-12-06

## 2024-12-01 RX ORDER — ENOXAPARIN SODIUM 100 MG/ML
40 INJECTION SUBCUTANEOUS DAILY
Status: DISCONTINUED | OUTPATIENT
Start: 2024-12-02 | End: 2024-12-01 | Stop reason: DRUGHIGH

## 2024-12-01 RX ORDER — ONDANSETRON 2 MG/ML
4 INJECTION INTRAMUSCULAR; INTRAVENOUS ONCE
Status: COMPLETED | OUTPATIENT
Start: 2024-12-01 | End: 2024-12-01

## 2024-12-01 RX ORDER — METOCLOPRAMIDE HYDROCHLORIDE 5 MG/ML
5 INJECTION INTRAMUSCULAR; INTRAVENOUS EVERY 8 HOURS PRN
Status: DISCONTINUED | OUTPATIENT
Start: 2024-12-01 | End: 2024-12-06

## 2024-12-01 RX ORDER — SODIUM CHLORIDE 9 MG/ML
INJECTION, SOLUTION INTRAVENOUS CONTINUOUS
Status: DISCONTINUED | OUTPATIENT
Start: 2024-12-01 | End: 2024-12-03

## 2024-12-01 RX ORDER — HYDROMORPHONE HYDROCHLORIDE 1 MG/ML
0.5 INJECTION, SOLUTION INTRAMUSCULAR; INTRAVENOUS; SUBCUTANEOUS ONCE
Status: DISCONTINUED | OUTPATIENT
Start: 2024-12-01 | End: 2024-12-02 | Stop reason: HOSPADM

## 2024-12-01 RX ORDER — HYDROMORPHONE HYDROCHLORIDE 1 MG/ML
0.8 INJECTION, SOLUTION INTRAMUSCULAR; INTRAVENOUS; SUBCUTANEOUS EVERY 4 HOURS PRN
Status: DISCONTINUED | OUTPATIENT
Start: 2024-12-01 | End: 2024-12-06

## 2024-12-01 RX ORDER — ACETAMINOPHEN 10 MG/ML
750 INJECTION, SOLUTION INTRAVENOUS EVERY 6 HOURS PRN
Status: DISCONTINUED | OUTPATIENT
Start: 2024-12-01 | End: 2024-12-06

## 2024-12-01 RX ORDER — HYDROMORPHONE HYDROCHLORIDE 1 MG/ML
0.2 INJECTION, SOLUTION INTRAMUSCULAR; INTRAVENOUS; SUBCUTANEOUS EVERY 4 HOURS PRN
Status: DISCONTINUED | OUTPATIENT
Start: 2024-12-01 | End: 2024-12-06

## 2024-12-02 ENCOUNTER — APPOINTMENT (OUTPATIENT)
Dept: GENERAL RADIOLOGY | Facility: HOSPITAL | Age: 74
End: 2024-12-02
Attending: HOSPITALIST
Payer: MEDICARE

## 2024-12-02 LAB
ALBUMIN SERPL-MCNC: 4.2 G/DL (ref 3.2–4.8)
ALBUMIN/GLOB SERPL: 1.7 {RATIO} (ref 1–2)
ALP LIVER SERPL-CCNC: 50 U/L
ALT SERPL-CCNC: 68 U/L
ANION GAP SERPL CALC-SCNC: 12 MMOL/L (ref 0–18)
AST SERPL-CCNC: 35 U/L (ref ?–34)
BASOPHILS # BLD AUTO: 0.01 X10(3) UL (ref 0–0.2)
BASOPHILS NFR BLD AUTO: 0.2 %
BILIRUB SERPL-MCNC: 1.5 MG/DL (ref 0.2–1.1)
BILIRUB UR QL STRIP.AUTO: NEGATIVE
BUN BLD-MCNC: 18 MG/DL (ref 9–23)
CALCIUM BLD-MCNC: 9.6 MG/DL (ref 8.7–10.4)
CHLORIDE SERPL-SCNC: 101 MMOL/L (ref 98–112)
CLARITY UR REFRACT.AUTO: CLEAR
CO2 SERPL-SCNC: 30 MMOL/L (ref 21–32)
CREAT BLD-MCNC: 1.5 MG/DL
EGFRCR SERPLBLD CKD-EPI 2021: 37 ML/MIN/1.73M2 (ref 60–?)
EOSINOPHIL # BLD AUTO: 0.01 X10(3) UL (ref 0–0.7)
EOSINOPHIL NFR BLD AUTO: 0.2 %
ERYTHROCYTE [DISTWIDTH] IN BLOOD BY AUTOMATED COUNT: 12.9 %
GLOBULIN PLAS-MCNC: 2.5 G/DL (ref 2–3.5)
GLUCOSE BLD-MCNC: 108 MG/DL (ref 70–99)
GLUCOSE UR STRIP.AUTO-MCNC: NORMAL MG/DL
HCT VFR BLD AUTO: 34 %
HGB BLD-MCNC: 12 G/DL
IMM GRANULOCYTES # BLD AUTO: 0.01 X10(3) UL (ref 0–1)
IMM GRANULOCYTES NFR BLD: 0.2 %
KETONES UR STRIP.AUTO-MCNC: 20 MG/DL
LEUKOCYTE ESTERASE UR QL STRIP.AUTO: NEGATIVE
LYMPHOCYTES # BLD AUTO: 0.77 X10(3) UL (ref 1–4)
LYMPHOCYTES NFR BLD AUTO: 15.4 %
MCH RBC QN AUTO: 32.3 PG (ref 26–34)
MCHC RBC AUTO-ENTMCNC: 35.3 G/DL (ref 31–37)
MCV RBC AUTO: 91.6 FL
MONOCYTES # BLD AUTO: 0.42 X10(3) UL (ref 0.1–1)
MONOCYTES NFR BLD AUTO: 8.4 %
NEUTROPHILS # BLD AUTO: 3.79 X10 (3) UL (ref 1.5–7.7)
NEUTROPHILS # BLD AUTO: 3.79 X10(3) UL (ref 1.5–7.7)
NEUTROPHILS NFR BLD AUTO: 75.6 %
NITRITE UR QL STRIP.AUTO: NEGATIVE
OSMOLALITY SERPL CALC.SUM OF ELEC: 298 MOSM/KG (ref 275–295)
PH UR STRIP.AUTO: 7.5 [PH] (ref 5–8)
PLATELET # BLD AUTO: 181 10(3)UL (ref 150–450)
POTASSIUM SERPL-SCNC: 3 MMOL/L (ref 3.5–5.1)
POTASSIUM SERPL-SCNC: 3.7 MMOL/L (ref 3.5–5.1)
PROT SERPL-MCNC: 6.7 G/DL (ref 5.7–8.2)
PROT UR STRIP.AUTO-MCNC: 30 MG/DL
RBC # BLD AUTO: 3.71 X10(6)UL
RBC UR QL AUTO: NEGATIVE
SODIUM SERPL-SCNC: 143 MMOL/L (ref 136–145)
SP GR UR STRIP.AUTO: 1.01 (ref 1–1.03)
UROBILINOGEN UR STRIP.AUTO-MCNC: NORMAL MG/DL
WBC # BLD AUTO: 5 X10(3) UL (ref 4–11)

## 2024-12-02 PROCEDURE — 74018 RADEX ABDOMEN 1 VIEW: CPT | Performed by: HOSPITALIST

## 2024-12-02 PROCEDURE — 99233 SBSQ HOSP IP/OBS HIGH 50: CPT | Performed by: HOSPITALIST

## 2024-12-02 PROCEDURE — 71045 X-RAY EXAM CHEST 1 VIEW: CPT | Performed by: HOSPITALIST

## 2024-12-02 PROCEDURE — 99223 1ST HOSP IP/OBS HIGH 75: CPT | Performed by: STUDENT IN AN ORGANIZED HEALTH CARE EDUCATION/TRAINING PROGRAM

## 2024-12-02 RX ORDER — POTASSIUM CHLORIDE 14.9 MG/ML
20 INJECTION INTRAVENOUS ONCE
Status: COMPLETED | OUTPATIENT
Start: 2024-12-02 | End: 2024-12-02

## 2024-12-02 NOTE — ED INITIAL ASSESSMENT (HPI)
Language Line  [id number] used for assessment . PT and PT's state that theta they are concerned about bowel obstruction, hx of colostomy bag and kidney drainage tube. PT states that the pian is a 8/10 that began yesterday afternoon. No bowel movement since then , nauseous , vomit x 6 since yesterday. PT unable to eat or drink, everything \"comes back out\". PT adds lower back pain present.

## 2024-12-02 NOTE — PROGRESS NOTES
[facility]   part of [facility]     Hospitalist Progress Note     Anmol Gonzalez Patient Status:  Inpatient    2/15/1950 MRN [id number]   [facility] 5NW-A Attending Edmundo Varela MD   Hosp Day # 1 PCP Jess Esquivel MD     Chief Complaint: nausea vomiting abd pain    Subjective:     Patient still with abd pain    Objective:    Review of Systems:   A comprehensive review of systems was completed; pertinent positive and negatives stated in subjective.    Vital signs:  Temp:  [97.9 °F (36.6 °C)-99 °F (37.2 °C)] 99 °F (37.2 °C)  Pulse:  [79-87] 79  Resp:  [16] 16  BP: (119-166)/(66-83) 162/66  SpO2:  [94 %-100 %] 97 %    Physical Exam:    General: No acute distress  Respiratory: No wheezes, no rhonchi  Cardiovascular: S1, S2, regular rate and rhythm  Abdomen: Soft, Non-tender, non-distended, positive bowel sounds  Neuro: No new focal deficits.   Extremities: No edema      Diagnostic Data:    Labs:  Recent Labs   Lab 24  1829 24  0549   WBC 8.9 5.0   HGB 14.3 12.0   MCV 90.4 91.6   .0 181.0       Recent Labs   Lab 24  0549   * 108*   BUN 17 18   CREATSERUM 1.62* 1.50*   CA 11.0* 9.6   ALB 5.3* 4.2   * 143   K 3.9 3.0*   CL 95* 101   CO2 26.0 30.0   ALKPHO 63 50*   AST 51* 35*   * 68*   BILT 1.8* 1.5*   TP 8.2 6.7       Estimated Creatinine Clearance: 23.8 mL/min (A) (based on SCr of 1.5 mg/dL (H)).    No results for input(s): \"TROP\", \"TROPHS\", \"CK\" in the last 168 hours.    No results for input(s): \"PTP\", \"INR\" in the last 168 hours.               Microbiology    No results found for this visit on 24.      Imaging: Reviewed in Epic.    Medications:    pantoprazole  40 mg Intravenous Q12H    potassium chloride  40 mEq Intravenous Once    Followed by    potassium chloride  20 mEq Intravenous Once    sodium chloride  1,000 mL Intravenous Once    enoxaparin  30 mg Subcutaneous Daily       Assessment & Plan:      #Small bowel  obstruction  #hyperbilirubinemia better  #lactic acidosis better  #elevated aminotransferases better  -AST 51, , Tbili 1.8    -CT showing dilated fluid filled loops of small bowel in abdomen and pelvis with multiple areas of surgical clips consistent with SBO  -NPO  -NG tube   -pain control: tylenol first then opiates prn  -zofran prn  -cont to trend LFT's/lactate  -NSS@100 mls/h  -general surgery c/s      #CKD 3b  -Cr 1.62: baseline 1.36 with GFR 41  -less than 1.5 x elevation  -cont to trend BMP  -strict I/O, avoid nephrotoxins  -hold home bicarb tabs while NPO and monitor for acidosis     #HLD  -hold home statin while NPO     #insomnia-hold home trazodone while NPO     #iron deficiency  -hold home iron supplement while NPO     #chronic pain  -hold home tizanidine         Indu Bailey MD    Supplementary Documentation:     Quality:  DVT Mechanical Prophylaxis:   SCDs,    DVT Pharmacologic Prophylaxis   Medication    enoxaparin (Lovenox) 30 MG/0.3ML SUBQ injection 30 mg                Code Status: Not on file  Pratt: No urinary catheter in place  Pratt Duration (in days):   Central line:    SHELLEY:     Discharge is dependent on: progress  At this point Ms. Gonzalez is expected to be discharge to: home    The 21st Century Cures Act makes medical notes like these available to patients in the interest of transparency. Please be advised this is a medical document. Medical documents are intended to carry relevant information, facts as evident, and the clinical opinion of the practitioner. The medical note is intended as peer to peer communication and may appear blunt or direct. It is written in medical language and may contain abbreviations or verbiage that are unfamiliar.              **Certification      PHYSICIAN Certification of Need for Inpatient Hospitalization - Initial Certification    Patient will require inpatient services that will reasonably be expected to span two midnight's based on the clinical  documentation in H+P.   Based on patients current state of illness, I anticipate that, after discharge, patient will require TBD.

## 2024-12-02 NOTE — SPIRITUAL CARE NOTE
Spiritual Care Visit Note    Patient Name: Anmol Gonzalez Date of Spiritual Care Visit: 24   : 2/15/1950 Primary Dx: SBO (small bowel obstruction) (HCC)       Referred By: Referral From: Nurse    Spiritual Care Taxonomy:    Intended Effects: Build relationship of care and support    Methods: Offer support;Collaborate with care team member    Interventions: Active listening;Ask guided questions    Visit Type/Summary:     - PoA: Patient unable to complete PoAH at this time:  spoke with nurse concerning POAH consult. Nurse confirmed that patient speaks mandarin and prefers that daughter is present. Daughter will arrive after 8am. Day  will be notified of this.  remains available for follow up.    Spiritual Care support can be requested via an Epic consult. For urgent/immediate needs, please contact the On Call  at: Edward: ext [phone]    Matthew. Bridgette Newman Mdiv.

## 2024-12-02 NOTE — PLAN OF CARE
Patient is oriented x4, VSS on RA, No pain, Bowel sounds absent- few hypoactive sounds throughout abdomen, mild tenderness to general abdomen, intermittent nausea, strict NPO. Call light and belongings within reach.

## 2024-12-02 NOTE — H&P
[facility]     Anmol Gonzalez Patient Status:  Emergency    2/15/1950 MRN [id number]   Location [facility] EMERGENCY DEPARTMENT Attending Jairo West MD   Hosp Day # 0 PCP Jess Esquivel MD     Chief Complaint n/v    Subjective:    History of Present Illness:     Anmol Gonzalez is a 74 year old female with PMHx cervical ca (s/p radiation, colostomy, ileostomy)/ HLD/ who presented to the hospital for nausea and emesis. Mandarin  was utilized to help obtain history. She reports beginning to have problems with nausea and emesis starting this morning. She was not able to drink or eat without vomiting shortly thereafter. She had some abdominal discomfort only prior to vomiting which then resolved after emesis. She notes her colostomy last had outpt in the early morning and then nothing since then. She notes chills but denied any fever. She reports one obstruction in the past after her colostomy surgery.    History/Other:    Past Medical History:  Past Medical History:    Cancer (HCC)    cervicle    CKD (chronic kidney disease)     Past Surgical History:   Past Surgical History:   Procedure Laterality Date    Other surgical history      BLAADER REMOVAL    Part removal colon w end colostomy        Family History:   Family History   Problem Relation Age of Onset    Stroke Father     Stroke Mother      Social History:    reports that she has never smoked. She has never used smokeless tobacco. She reports that she does not drink alcohol and does not use drugs.     Allergies: Allergies[1]    Medications:  Medications Ordered Prior to Encounter[2]    Review of Systems:   A comprehensive review of systems was completed.    Pertinent positives and negatives noted in the HPI.    Objective:   Physical Exam:    /70   Pulse 79   Temp 97.9 °F (36.6 °C) (Temporal)   Resp 16   Ht 5' 1\" (1.549 m)   Wt 98 lb (44.5 kg)   SpO2 100%   BMI 18.52 kg/m²   General: No acute distress,  Alert  Respiratory: No rhonchi, no wheezes  Cardiovascular: S1, S2. Regular rate and rhythm  Abdomen: Soft, Non-tender, non-distended, positive bowel sounds, colostomy bag on left mid abdomen with air and mild stool  Neuro: No new focal deficits  Extremities: No edema    Results:    Labs:      Labs Last 24 Hours:    Recent Labs   Lab 12/01/24 1829   RBC 4.46   HGB 14.3   HCT 40.3   MCV 90.4   MCH 32.1   MCHC 35.5   RDW 12.8   NEPRELIM 7.91*   WBC 8.9   .0       Recent Labs   Lab 12/01/24  1829   *   BUN 17   CREATSERUM 1.62*   EGFRCR 34*   CA 11.0*   ALB 5.3*   *   K 3.9   CL 95*   CO2 26.0   ALKPHO 63   AST 51*   *   BILT 1.8*   TP 8.2       Lab Results   Component Value Date    INR 1.0 11/14/2024    INR 1.0 08/14/2024       No results for input(s): \"TROP\", \"TROPHS\", \"CK\" in the last 168 hours.    No results for input(s): \"TROP\", \"PBNP\" in the last 168 hours.    No results for input(s): \"PCT\" in the last 168 hours.    Imaging: Imaging data reviewed in Epic.    Assessment & Plan:      #Small bowel obstruction  #hyperbilirubinemia  #lactic acidosis  #elevated aminotransferases  -AST 51, , Tbili 1.8  -lactate 2.4  -CT showing dilated fluid filled loops of small bowel in abdomen and pelvis with multiple areas of surgical clips consistent with SBO  -NPO  -NG tube placed in ED  -pain control: tylenol first then opiates prn  -zofran prn  -cont to trend LFT's/lactate  -NSS@100 mls/h  -general surgery c/s in ED    #CKD 3b  -Cr 1.62: baseline 1.36 with GFR 41  -less than 1.5 x elevation  -cont to trend BMP  -strict I/O, avoid nephrotoxins  -hold home bicarb tabs while NPO and monitor for acidosis    #HLD  -hold home statin while NPO    #insomnia-hold home trazodone while NPO    #iron deficiency  -hold home iron supplement while NPO    #chronic pain  -hold home tizanidine      Plan of care discussed with ED physician    Sara Vogel DO    Supplementary Documentation:     The 21st Century  Cures Act makes medical notes like these available to patients in the interest of transparency. Please be advised this is a medical document. Medical documents are intended to carry relevant information, facts as evident, and the clinical opinion of the practitioner. The medical note is intended as peer to peer communication and may appear blunt or direct. It is written in medical language and may contain abbreviations or verbiage that are unfamiliar.                                       [1]   Allergies  Allergen Reactions    Famotidine NAUSEA AND VOMITING   [2]   Current Facility-Administered Medications on File Prior to Encounter   Medication Dose Route Frequency Provider Last Rate Last Admin    [COMPLETED] levoFLOXacin in dextrose 5% (Levaquin) 500 mg/100mL IVPB premix             [COMPLETED] lidocaine (Xylocaine) 1 % injection             [COMPLETED] fentaNYL (Sublimaze) 50 mcg/mL injection             [COMPLETED] iopamidol (ISOVUE-M 300) 61 % injection 30 mL  30 mL Injection ONCE PRN Byron Brian MD   15 mL at 11/14/24 1219     Current Outpatient Medications on File Prior to Encounter   Medication Sig Dispense Refill    polyethylene glycol, PEG 3350, 17 g Oral Powd Pack Take 17 g by mouth daily.      clindamycin 300 MG Oral Cap Take 1 capsule (300 mg total) by mouth every 8 (eight) hours.      rosuvastatin 5 MG Oral Tab Take 1 tablet (5 mg total) by mouth daily.      traZODone 50 MG Oral Tab Take 1 tablet (50 mg total) by mouth nightly. 90 tablet 0    Ferrous Sulfate 325 (65 Fe) MG Oral Tab Take 1 tablet (325 mg total) by mouth daily with breakfast. 90 tablet 0    Ascorbic Acid (VITAMIN C OR) Take by mouth daily.      Zolpidem Tartrate ER (AMBIEN CR) 6.25 MG Oral Tab CR Take 1 tablet (6.25 mg total) by mouth nightly as needed for Sleep. 90 tablet 0    Coenzyme Q10 100 MG Oral Cap Take 300 mg by mouth daily.      loperamide 2 MG Oral Cap Take 1 capsule (2 mg total) by mouth as needed.      omega-3 fatty  acids 1000 MG Oral Cap Take 1,000 mg by mouth daily.      sodium bicarbonate 650 MG Oral Tab Take 1 tablet (650 mg total) by mouth 2 (two) times daily.      SF 5000 PLUS 1.1 % Dental Cream Apply a thin ribbon of paste to a toothbrush. Brush thoroughly once daily for 2 minutes, preferably at bedtime. Spit the paste out after use and do not eat, drink, or rinse for 30 minutes. Do not swallow the paste. Use regular toothpaste in the morning.      tiZANidine 2 MG Oral Tab Take 1-2 tablets (2-4 mg total) by mouth 2 (two) times daily as needed.

## 2024-12-02 NOTE — PLAN OF CARE
NURSING ADMISSION NOTE      Patient admitted via Cart  Oriented to room 523.  Safety precautions initiated.  Bed in low position.  Call light in reach.  Admission and home meds reviewed with patient and daughter  Patient Mandarin speaking    Data: Ax04.   Telemetry sinus rhythm  , room air  Afebrile  Continent   Right Nostril NG tube, connected to intermittent.   IV protonix   Strict NPO, IVF 0.9 Nacl@ 100 ml/hr  PT/OT  General surgery consult      Problem: Patient/Family Goals  Goal: Patient/Family Long Term Goal  Description: Patient's Long Term Goal:   Get ng tube removed    Interventions:  - General surgery  - IVF  - See additional Care Plan goals for specific interventions  Outcome: Progressing  Goal: Patient/Family Short Term Goal  Description: Patient's Short Term Goal:   12/1 noc: NG suction    Interventions:   - low intermittent  - IVF  - See additional Care Plan goals for specific interventions  Outcome: Progressing     Problem: RESPIRATORY - ADULT  Goal: Achieves optimal ventilation and oxygenation  Description: INTERVENTIONS:  - Assess for changes in respiratory status  - Assess for changes in mentation and behavior  - Position to facilitate oxygenation and minimize respiratory effort  - Oxygen supplementation based on oxygen saturation or ABGs  - Provide Smoking Cessation handout, if applicable  - Encourage broncho-pulmonary hygiene including cough, deep breathe, Incentive Spirometry  - Assess the need for suctioning and perform as needed  - Assess and instruct to report SOB or any respiratory difficulty  - Respiratory Therapy support as indicated  - Manage/alleviate anxiety  - Monitor for signs/symptoms of CO2 retention  Outcome: Progressing

## 2024-12-02 NOTE — SPIRITUAL CARE NOTE
Spiritual Care Visit Note    Patient Name: Anmol Gonzalez Date of Spiritual Care Visit: 24   : 2/15/1950 Primary Dx: SBO (small bowel obstruction) (HCC)       Referred By: Referral From: Nurse    Spiritual Care Taxonomy:    Intended Effects: Aligning care plan with patient's values    Methods: Collaborate with care team member;Encourage self reflection;Offer support    Interventions: Acknowledge current situation;Active listening;Ask guided questions;Assist patient with documenting choices;Assist patient with documenting values;Assist someone with Advance Directives;Assist with determining decision maker;Identify supportive relationship(s)    Visit Type/Summary:     - PoA: New PoAH Created: Visited patient in response to PoA for Healthcare consult/request. Created a new PoAH for Healthcare document that, per the patient, accurately reflects their wishes. Gave the patient the original PoAH document along with copies. Confirmed that the PoAH primary agent name and contact information have been entered into the Epic, Advance Directives section. A copy of the new PoAH document has been placed on the patient's paper chart. Engaged Language Line Interpretive services.  remains available for follow up.    Spiritual Care support can be requested via an Epic consult. For urgent/immediate needs, please contact the On Call  at: [facility]: ext [phone]    Rev. Rina Au MA  Chaplain Resident

## 2024-12-02 NOTE — CONSULTS
[facility]  Report of Consultation    Anmol Gonzalez Patient Status:  Inpatient    2/15/1950 MRN [id number]   Location [facility] 5NW-A Attending Edmundo Varela MD   Hosp Day # 1 PCP Jess Esquivel MD     Requesting Physician:  Dr. Vogel    Reason for Consultation:  Small bowel obstruction    Chief Complaint:  Decreased ostomy output    History of Present Illness:  Anmol Gonzalez is a 74 year old female who presents to [facility] with decreased ostomy output.    Language line services were used during today's visit as the patient is primarily Mandarin speaking ([phone]).    The patient has a history of cervical cancer for which she completed neoadjuvant chemoradiation in  followed by hysterectomy in . This was complicated by vagino-vesical fistula. She underwent surgery to fix the fistula, but had urinary leakage through her vagina.     [facility] with a small bowel obstruction and underwent lysis of adhesions. Her procedure was complicated by enterotomy's and ultimately underwent ileocecetomy.     She was taken back to the OR 2 times for presumed leak, abdominal washout, and wound VAC dressing changes. One procedure involved sigmoid colon resection and creation of diverting ileostomy. She developed short bowel syndrome and had high output from her fistula. She was started on Lomotil and has intermittent IV fluid rehydration.     In , the patient underwent CT of the abdomen and pelvis which revealed a distended urinary bladder with bladder wall thickening and posterior wall defect.    She underwent pelvic exenteration, segmental small bowel resection x 2, bilateral gracilis muscle flaps for perineal reconstruction, cystectomy, cutaneous ureterostomy, transcutaneous uteterostomy left to right on 2022 in [place], [place].    Over the past few days, the patient noticed decreased ostomy output. Per the ER notes, the patient reported nausea and emesis this  morning.    Upon presentation to the hospital, the patient was hemodynamically stable.  CBC revealed WBC 8.9 with left shift of 7.91, hemoglobin 14.3, platelets 230,000. Slightly hyponatremic at 135, hypokalemic at 95. BUN 17, creatinine 1.62. Ast 51, , alkaline phosphatase 63. Total bilirubin elevated at 1.8.    CT of the abdomen and pelvis reveals dilation fluid filled loops of small bowel with multiple areas of surgical clips. Findings are consistent with small bowel obstruction. Cholelithiasis with distended gallbladder. The common bile duct measures 8 mm.    Currently, the patient is resting in bed. NG tube was placed in the ER. She denies abdominal pain, nausea, and vomiting. She has not noticed increased ostomy output since NG tube placement.    She has a significant past surgical history as listed above. She has a significant past medical history of cervical cancer and chronic kidney disease. She denies taking blood thinning medications.      History:  Past Medical History:    Cancer (HCC)    cervicle    CKD (chronic kidney disease)     Past Surgical History:   Procedure Laterality Date    Other surgical history      BLAADER REMOVAL    Part removal colon w end colostomy       Family History   Problem Relation Age of Onset    Stroke Father     Stroke Mother       reports that she has never smoked. She has never used smokeless tobacco. She reports that she does not drink alcohol and does not use drugs.    Allergies:  Allergies[1]    Medications:  Medications Prior to Admission   Medication Sig    polyethylene glycol, PEG 3350, 17 g Oral Powd Pack Take 17 g by mouth daily.    clindamycin 300 MG Oral Cap Take 1 capsule (300 mg total) by mouth every 8 (eight) hours.    rosuvastatin 5 MG Oral Tab Take 1 tablet (5 mg total) by mouth daily.    traZODone 50 MG Oral Tab Take 1 tablet (50 mg total) by mouth nightly.    Ferrous Sulfate 325 (65 Fe) MG Oral Tab Take 1 tablet (325 mg total) by mouth daily with  breakfast.    Ascorbic Acid (VITAMIN C OR) Take by mouth daily.    Zolpidem Tartrate ER (AMBIEN CR) 6.25 MG Oral Tab CR Take 1 tablet (6.25 mg total) by mouth nightly as needed for Sleep.    Coenzyme Q10 100 MG Oral Cap Take 300 mg by mouth daily.    loperamide 2 MG Oral Cap Take 1 capsule (2 mg total) by mouth as needed.    omega-3 fatty acids 1000 MG Oral Cap Take 1,000 mg by mouth daily.    sodium bicarbonate 650 MG Oral Tab Take 1 tablet (650 mg total) by mouth 2 (two) times daily.    tiZANidine 2 MG Oral Tab Take 1-2 tablets (2-4 mg total) by mouth 2 (two) times daily as needed.    SF 5000 PLUS 1.1 % Dental Cream Apply a thin ribbon of paste to a toothbrush. Brush thoroughly once daily for 2 minutes, preferably at bedtime. Spit the paste out after use and do not eat, drink, or rinse for 30 minutes. Do not swallow the paste. Use regular toothpaste in the morning.         Current Facility-Administered Medications:     pantoprazole (Protonix) 40 mg in sodium chloride 0.9% PF 10 mL IV push, 40 mg, Intravenous, Q12H    potassium chloride 40 mEq in 250mL sodium chloride 0.9% IVPB premix, 40 mEq, Intravenous, Once **FOLLOWED BY** potassium chloride 20 mEq/100mL IVPB premix 20 mEq, 20 mEq, Intravenous, Once    sodium chloride 0.9% infusion, , Intravenous, Continuous    sodium chloride 0.9 % IV bolus 1,000 mL, 1,000 mL, Intravenous, Once    acetaminophen (Ofirmev) 10 mg/mL infusion premix 750 mg, 750 mg, Intravenous, Q6H PRN    sodium chloride 0.9% infusion, , Intravenous, Continuous    HYDROmorphone (Dilaudid) 1 MG/ML injection 0.2 mg, 0.2 mg, Intravenous, Q4H PRN **OR** HYDROmorphone (Dilaudid) 1 MG/ML injection 0.4 mg, 0.4 mg, Intravenous, Q4H PRN **OR** HYDROmorphone (Dilaudid) 1 MG/ML injection 0.8 mg, 0.8 mg, Intravenous, Q4H PRN    ondansetron (Zofran) 4 MG/2ML injection 4 mg, 4 mg, Intravenous, Q6H PRN    metoclopramide (Reglan) 5 mg/mL injection 5 mg, 5 mg, Intravenous, Q8H PRN    glycerin-hypromellose-  (Artificial Tears) 0.2-0.2-1 % ophthalmic solution 1 drop, 1 drop, Both Eyes, QID PRN    sodium chloride (Saline Mist) 0.65 % nasal solution 1 spray, 1 spray, Each Nare, Q3H PRN    enoxaparin (Lovenox) 30 MG/0.3ML SUBQ injection 30 mg, 30 mg, Subcutaneous, Daily    Review of Systems:  Pertinent items are noted in HPI.  Allergic/Immuno:  Review of patient's allergies performed.  Cardiovascular:  Negative for cool extremity and irregular heartbeat/palpitations  Constitutional:  Negative for decreased activity, fever, irritability and lethargy  Endocrine:  Negative for abnormal sleep patterns, increased activity, polydipsia and polyphagia  ENMT:  Negative for ear drainage, hearing loss and nasal drainage  Eyes:  Negative for eye discharge and vision loss  Gastrointestinal:  Positive for abdominal pain, nausea, vomiting.   Genitourinary:  Negative for dysuria and hematuria  Hema/Lymph:  Negative for easy bleeding and easy bruising  Integumentary:  Negative for pruritus and rash  Musculoskeletal:  Negative for bone/joint symptoms  Neurological:  Negative for gait disturbance  Psychiatric:  Negative for inappropriate interaction and psychiatric symptoms  Respiratory:  Negative for cough, dyspnea and wheezing      Physical Exam:  Blood pressure (!) 162/66, pulse 79, temperature 99 °F (37.2 °C), temperature source Oral, resp. rate 16, height 61\", weight 98 lb (44.5 kg), SpO2 97%, not currently breastfeeding.    General: Alert, orientated x3.  Cooperative.  No apparent distress.    HEENT: NG tube in place Without scleral icterus.  Mucous membranes are moist. EOM are WNL.    Neck: No tenderness to palpitation.  Full range of motion to flexion and extension, lateral rotation and lateral flexion of cervical spine.  No JVD. Supple.     Lungs: Clear to auscultation bilaterally. No wheezes, no rales, no rhonchi. Good excursion of the diaphragms. No secondary use of accessory respiratory musculature.    Cardiac: Regular rate and  rhythm. No murmur.    Abdomen: Soft, non-distended, non-tender to palpation, tympanic to percussion. No peritoneal signs. No guarding.  Left-sided ostomy in place with scant stool, stoma pink, midline incision appreciated    Extremities:  No lower extremity edema noted.  Without clubbing or cyanosis.      Skin: Normal texture and turgor.    Laboratory Data:  Recent Labs   Lab 12/01/24 1829 12/02/24  0549   RBC 4.46 3.71*   HGB 14.3 12.0   HCT 40.3 34.0*   MCV 90.4 91.6   MCH 32.1 32.3   MCHC 35.5 35.3   RDW 12.8 12.9   NEPRELIM 7.91* 3.79   WBC 8.9 5.0   .0 181.0       Recent Labs   Lab 12/01/24 1829 12/02/24  0549   * 108*   BUN 17 18   CREATSERUM 1.62* 1.50*   CA 11.0* 9.6   ALB 5.3* 4.2   * 143   K 3.9 3.0*   CL 95* 101   CO2 26.0 30.0   ALKPHO 63 50*   AST 51* 35*   * 68*   BILT 1.8* 1.5*   TP 8.2 6.7         No results for input(s): \"PTP\", \"INR\", \"PTT\" in the last 168 hours.      XR CHEST AP PORTABLE  (CPT=71045)    Result Date: 12/1/2024  CONCLUSION:  Normal heart size and pulmonary vascularity.  Enteric tube tip in stomach.  Right-sided nephrostomy tube.  Slight atelectasis in the retrocardiac left lung base.   LOCATION:  [facility]      Dictated by (CST): Cristina Velez MD on 12/01/2024 at 10:34 PM     Finalized by (CST): Cristina Velez MD on 12/01/2024 at 10:35 PM       CT ABDOMEN+PELVIS KIDNEYSTONE 2D RNDR(NO IV,NO ORAL)(CPT=74176)    Result Date: 12/1/2024  CONCLUSION:  Postsurgical changes of left-sided descending colostomy.  The right side of the colon appears decompressed.  There are dilated fluid-filled loops of small bowel in the abdomen and pelvis with multiple areas of surgical clips.  Findings are consistent with small bowel obstruction.  Cholelithiasis.  There is a right percutaneous nephrostomy with mild right-sided caliectasis.  Cortical scarring left kidney with left pyloric aily act assist.  There is a surgical clip in the vicinity of the proximal to mid left ureter.   Cystectomy and hysterectomy.    LOCATION:  [facility]   Dictated by (CST): Cristina Velez MD on 12/01/2024 at 8:57 PM     Finalized by (CST): Cristina Velez MD on 12/01/2024 at 9:14 PM          GARY Pablo  12/2/2024  8:31 AM    Is this a shared or split note between Advanced Practice Provider and Physician? Yes      Medical Decision Making         Impression:  Patient Active Problem List   Diagnosis    Hydronephrosis, right    Colostomy in place (HCC)    Stage 3a chronic kidney disease (HCC)    Elevated lipoprotein A level    Hyperlipidemia    Elevated blood pressure reading    Insomnia    Frequent diarrhea    Heart murmur    SBO (small bowel obstruction) (HCC)       This is a 74-year-old woman with small bowel obstruction    Patient has a significant abdominal surgical history including hysterectomy for cervical cancer followed by chemoradiation in 2006  She then had another episode of bowel obstruction requiring exploratory laparotomy which was complicated by an enterotomy with a leak needing multiple takeback's for abdominal washouts  Patient was given an ileostomy during this time, this was done in 2019  Patient had high output from her ileostomy and 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  Patient then underwent pelvic exenteration with bowel resection and muscle flap reconstruction in 2022  Patient's prior surgeries were done in the [facility]    She comes in last night with decreased ostomy output  Patient denies any abdominal pain, nausea, or vomiting  She was concerned that she had another bowel obstruction and presented to the emergency room  Patient had CT scan done  I reviewed these images personally and it shows dilated loops of small bowel throughout the patient's abdomen with decompressed colon, no obvious transition point  On physical exam, patient denies any abdominal pain  She does not appear distended  NG tube was placed with 1100 out since  Patient denies any increased  output from her ostomy    No acute surgical intervention at this time  Recommend continue conservative treatment with NG tube decompression  Encourage ambulation and mobilization  Due to patient's extensive abdominal surgical history, she would be at very high risk for postoperative abdominal complications  We will try conservative treatment but if patient does not progress, a long discussion about potential surgical intervention will need to be done with patient and patient's family  Surgery will continue to follow  Rest of care per primary    Thank you for letting me participate in the care of this patient  Najma Sagastume MD  [facility]  12/2/2024  1:22 PM         [1]   Allergies  Allergen Reactions    Famotidine NAUSEA AND VOMITING

## 2024-12-02 NOTE — ED PROVIDER NOTES
Patient Seen in: [facility] Emergency Department      History     Chief Complaint   Patient presents with    Abdomen/Flank Pain     Stated Complaint: abd pain x 1day    Subjective:   HPI      Patient is a 74-year-old female here with concern of bowel obstruction.  She had a history of hysterectomy for cervical cancer in 2009 followed by radiation with subsequent fistula formation of bowel obstruction requiring ileostomy and later a subsequent colostomy.  She is been suffering from diarrhea constipation.  Also has a history of CKD, neuropathy.  Has history of a vesicovaginal fistula status post and complex reconstruction and right hydronephrosis managed by nephrostomy tube.  Vomiting since yesterday.  Abdominal pain.  Mainly in the lower abdomen.  Decreased output from ostomy.  Daughter concerned about bowel obstruction she has had this in the past.  No other complaints.    Objective:     Past Medical History:    Cancer (HCC)    cervicle    CKD (chronic kidney disease)              Past Surgical History:   Procedure Laterality Date    Other surgical history      BLAADER REMOVAL    Part removal colon w end colostomy                  Social History     Socioeconomic History    Marital status: Unknown   Tobacco Use    Smoking status: Never    Smokeless tobacco: Never   Vaping Use    Vaping status: Never Used   Substance and Sexual Activity    Alcohol use: Never    Drug use: Never     Social Drivers of Health     Food Insecurity: Low Risk  (3/24/2024)    Received from [facility]    IP Discharge Planning Review     In the past 12 months have you experienced difficulty with any of the following?: No difficulties reported   Transportation Needs: Low Risk  (3/24/2024)    Received from [facility]    IP Discharge Planning Review     In the past 12 months have you experienced difficulty with any of the following?: No difficulties  reported   Housing Stability: Low Risk  (3/24/2024)    Received from [facility]     What is your living situation today?: I have a steady place to live                  Physical Exam     ED Triage Vitals [12/01/24 1810]   /83   Pulse 87   Resp 16   Temp 97.9 °F (36.6 °C)   Temp src Temporal   SpO2 96 %   O2 Device None (Room air)       Current Vitals:   Vital Signs  BP: 119/70  Pulse: 79  Resp: 16  Temp: 97.9 °F (36.6 °C)  Temp src: Temporal    Oxygen Therapy  SpO2: 100 %  O2 Device: None (Room air)        Physical Exam  Vitals and nursing note reviewed.   Constitutional:       General: She is not in acute distress.     Appearance: She is well-developed. She is not toxic-appearing.   HENT:      Head: Normocephalic and atraumatic.   Eyes:      General: No scleral icterus.     Conjunctiva/sclera: Conjunctivae normal.   Cardiovascular:      Rate and Rhythm: Normal rate.   Pulmonary:      Effort: Pulmonary effort is normal. No respiratory distress.   Abdominal:      General: There is no distension.      Tenderness: There is abdominal tenderness (Colostomy bag relatively empty). There is no guarding or rebound.   Musculoskeletal:         General: No tenderness. Normal range of motion.      Cervical back: Normal range of motion and neck supple.   Skin:     General: Skin is warm and dry.      Findings: No rash.   Neurological:      Mental Status: She is alert and oriented to person, place, and time.      Motor: No abnormal muscle tone.      Coordination: Coordination normal.   Psychiatric:         Behavior: Behavior normal.            ED Course     Labs Reviewed   COMP METABOLIC PANEL (14) - Abnormal; Notable for the following components:       Result Value    Glucose 135 (*)     Sodium 135 (*)     Chloride 95 (*)     Creatinine 1.62 (*)     Calcium, Total 11.0 (*)     eGFR-Cr 34 (*)     AST 51 (*)      (*)     Bilirubin, Total 1.8 (*)     Albumin 5.3 (*)      All other components within normal limits   CBC WITH DIFFERENTIAL WITH PLATELET - Abnormal; Notable for the following components:    Neutrophil Absolute Prelim 7.91 (*)     Neutrophil Absolute 7.91 (*)     Lymphocyte Absolute 0.51 (*)     All other components within normal limits   LACTIC ACID, PLASMA - Abnormal; Notable for the following components:    Lactic Acid 2.4 (*)     All other components within normal limits   LIPASE - Normal   URINALYSIS, ROUTINE   LACTIC ACID REFLEX POST POSTIVE                    MDM            -History source other than patient - daughter         -Comorbidities did add complexity to the management are mentioned in the HPI above        -I personally reviewed the prior external notes and the medical record to obtain additional history I reviewed GI notes as well as urology notes from August 2024 and October 2024.  Patient has history of cervical cancer and has had radiation.  Patient is undergone cystectomy and complex reconstruction.  History of vesicovaginal fistula.        -DDX: Includes but not limited to bowel obstruction, dehydration, which is a medical condition that can pose a threat to life/function             -I personally reviewed the CT findings and it shows sbo  Please refer to radiology report for official interpretation       Labs Reviewed   COMP METABOLIC PANEL (14) - Abnormal; Notable for the following components:       Result Value    Glucose 135 (*)     Sodium 135 (*)     Chloride 95 (*)     Creatinine 1.62 (*)     Calcium, Total 11.0 (*)     eGFR-Cr 34 (*)     AST 51 (*)      (*)     Bilirubin, Total 1.8 (*)     Albumin 5.3 (*)     All other components within normal limits   CBC WITH DIFFERENTIAL WITH PLATELET - Abnormal; Notable for the following components:    Neutrophil Absolute Prelim 7.91 (*)     Neutrophil Absolute 7.91 (*)     Lymphocyte Absolute 0.51 (*)     All other components within normal limits   LACTIC ACID, PLASMA - Abnormal; Notable for the  following components:    Lactic Acid 2.4 (*)     All other components within normal limits   LIPASE - Normal   URINALYSIS, ROUTINE   LACTIC ACID REFLEX POST POSTIVE     Slightly elevated lactic.  BUN is normal creatinine is 1.6 potassium is normal sodium 135.  Lipase normal.  Patient was given IV fluids, antiemetics, pain control.  NG tube has been ordered for placement.  Discussed with general surgery and hospitalist patient will be admitted for further care.      Admission disposition: 12/1/2024 10:06 PM           Medical Decision Making      Disposition and Plan     Clinical Impression:  1. SBO (small bowel obstruction) ([facility])         Disposition:  Admit  12/1/2024 10:06 pm    Follow-up:  No follow-up provider specified.        Medications Prescribed:  Current Discharge Medication List              Supplementary Documentation:         Hospital Problems       Present on Admission  Date Reviewed: 12/1/2024            ICD-10-CM Noted POA    * (Principal) SBO (small bowel obstruction) ([facility]) K56.609 12/1/2024 Unknown

## 2024-12-03 PROCEDURE — 99232 SBSQ HOSP IP/OBS MODERATE 35: CPT | Performed by: HOSPITALIST

## 2024-12-03 PROCEDURE — 99232 SBSQ HOSP IP/OBS MODERATE 35: CPT | Performed by: STUDENT IN AN ORGANIZED HEALTH CARE EDUCATION/TRAINING PROGRAM

## 2024-12-03 RX ORDER — DEXTROSE MONOHYDRATE AND SODIUM CHLORIDE 5; .9 G/100ML; G/100ML
INJECTION, SOLUTION INTRAVENOUS CONTINUOUS
Status: DISCONTINUED | OUTPATIENT
Start: 2024-12-03 | End: 2024-12-04

## 2024-12-03 NOTE — PROGRESS NOTES
[facility]  Progress Note    Anmol Gonzalez Patient Status:  Inpatient    2/15/1950 MRN [id number]   Location [facility] 5NW-A Attending Edmundo Varela MD   Hosp Day # 2 PCP Jess Esquivel MD     Subjective:  The patient is resting in bed with her daughter, Christianne, at bedside who aids in translation.    The patient reports feeling about the same as yesterday.  She reports a small amount of stool from her ostomy.    Objective/Physical Exam:  General: Alert, orientated x3.  Cooperative.  No apparent distress.  Vital Signs:  Blood pressure (!) 172/72, pulse 70, temperature 98.7 °F (37.1 °C), temperature source Oral, resp. rate 16, height 61\", weight 98 lb (44.5 kg), SpO2 98%, not currently breastfeeding.  HEENT: Normocephalic, atraumatic. No scleral icterus.  Abdomen:  Soft, non-distended, non-tender, with no rebound or guarding.  No peritoneal signs.  Ostomy in left abdomen with hard stool in appliance.    Labs:  CBC:    Lab Results   Component Value Date    WBC 5.0 2024    WBC 8.9 2024     Lab Results   Component Value Date    HGB 12.0 2024    HGB 14.3 2024      Lab Results   Component Value Date    .0 2024    .0 2024     Lab Results   Component Value Date    K 3.7 2024         Assessment/Plan:  Patient Active Problem List   Diagnosis    Hydronephrosis, right    Colostomy in place ([facility])    Stage 3a chronic kidney disease (HCC)    Elevated lipoprotein A level    Hyperlipidemia    Elevated blood pressure reading    Insomnia    Frequent diarrhea    Heart murmur    SBO (small bowel obstruction) (HCC)     Small bowel obstruction    Continue NG tube to low intermittent suction.  Glycerin suppository via ostomy today to stimulate bowel function.  Continue pain control and antiemetics as needed.  Encourage ambulation and up to chair.  DVT prophylaxis with Lovenox.  GI prophylaxis with Protonix.      GARY Pablo  12/3/2024  8:53 AM     This note was initiated  by Collette Fuchs.  The PA saw the patient in conjunction with me. The PA performed a history, exam, and developed the assessment and plan in conjunction with me. I agree with the above note and have made changes which reflect my own history and physical, if necessary.    No acute events overnight  Patient states she feels the same  She does report seeing a little bit more output from her ostomy  She denies any nausea vomiting  NG tube with approximately 200 cc out  No acute surgical intervention  Continue NG tube decompression  Will give suppository through ostomy to help stimulate a bowel movement    Patient's daughter was present for discussion  Prior to presentation, patient had what appeared to be some constipation  Her normal ostomy output is liquid more like diarrhea  Patient had harder and more formed stool  She went to Dr. Dubose who gave her MiraLAX and after 3 days, patient presented with concerns for bowel obstruction  Patient apparently has had issues with laxatives leading to obstructions.  I discussed with the daughter that for now, we will continue conservative management and just do a suppository through the ostomy  Patient most likely has a partial obstruction due to constipation    Surgery will continue to follow  Rest of care per primary    Najma Sagastume MD  [facility]  12/3/2024  12:06 PM

## 2024-12-03 NOTE — PROGRESS NOTES
[facility]   part of [facility]     Hospitalist Progress Note     Anmol Gonzalez Patient Status:  Inpatient    2/15/1950 MRN [id number]   [facility] 5NW-A Attending Edmundo Varela MD   Hosp Day # 2 PCP Jess Esquivel MD     Chief Complaint: nausea vomiting abd pain    Subjective:     Patient still with abd pain but better  NGT to LIS  No flatus    Objective:    Review of Systems:   A comprehensive review of systems was completed; pertinent positive and negatives stated in subjective.    Vital signs:  Temp:  [98 °F (36.7 °C)-99.4 °F (37.4 °C)] 98.4 °F (36.9 °C)  Pulse:  [62-77] 62  Resp:  [16-18] 18  BP: (148-163)/(58-75) 158/58  SpO2:  [96 %-99 %] 96 %    Physical Exam:    General: No acute distress  Respiratory: No wheezes, no rhonchi  Cardiovascular: S1, S2, regular rate and rhythm  Abdomen: Soft, Non-tender, non-distended, positive bowel sounds  Neuro: No new focal deficits.   Extremities: No edema      Diagnostic Data:    Labs:  Recent Labs   Lab 24  0549   WBC 8.9 5.0   HGB 14.3 12.0   MCV 90.4 91.6   .0 181.0       Recent Labs   Lab 24  0549 24  1747   * 108*  --    BUN 17 18  --    CREATSERUM 1.62* 1.50*  --    CA 11.0* 9.6  --    ALB 5.3* 4.2  --    * 143  --    K 3.9 3.0* 3.7   CL 95* 101  --    CO2 26.0 30.0  --    ALKPHO 63 50*  --    AST 51* 35*  --    * 68*  --    BILT 1.8* 1.5*  --    TP 8.2 6.7  --        Estimated Creatinine Clearance: 23.8 mL/min (A) (based on SCr of 1.5 mg/dL (H)).    No results for input(s): \"TROP\", \"TROPHS\", \"CK\" in the last 168 hours.    No results for input(s): \"PTP\", \"INR\" in the last 168 hours.               Microbiology    No results found for this visit on 24.      Imaging: Reviewed in Epic.    Medications:    pantoprazole  40 mg Intravenous Q12H    sodium chloride  1,000 mL Intravenous Once    enoxaparin  30 mg Subcutaneous Daily       Assessment & Plan:      #Small bowel  obstruction  #hyperbilirubinemia better  #lactic acidosis better  #elevated aminotransferases better  -AST 51, , Tbili 1.8    -CT showing dilated fluid filled loops of small bowel in abdomen and pelvis with multiple areas of surgical clips consistent with SBO  -NPO  -NG tube   -pain control: tylenol first then opiates prn  -zofran prn  -cont to trend LFT's/lactate  -NSS@100 mls/h  -general surgery c/s      #CKD 3b  -Cr 1.62: baseline 1.36 with GFR 41  -less than 1.5 x elevation  -cont to trend BMP  -strict I/O, avoid nephrotoxins  -hold home bicarb tabs while NPO and monitor for acidosis     #HLD  -hold home statin while NPO     #insomnia-hold home trazodone while NPO     #iron deficiency  -hold home iron supplement while NPO     #chronic pain  -hold home tizanidine         Indu Bailey MD    Supplementary Documentation:     Quality:  DVT Mechanical Prophylaxis:   SCDs,    DVT Pharmacologic Prophylaxis   Medication    enoxaparin (Lovenox) 30 MG/0.3ML SUBQ injection 30 mg                Code Status: Not on file  Pratt: No urinary catheter in place  Pratt Duration (in days):   Central line:    SHELLEY:     Discharge is dependent on: progress  At this point Ms. Gonzalez is expected to be discharge to: home    The 21st Century Cures Act makes medical notes like these available to patients in the interest of transparency. Please be advised this is a medical document. Medical documents are intended to carry relevant information, facts as evident, and the clinical opinion of the practitioner. The medical note is intended as peer to peer communication and may appear blunt or direct. It is written in medical language and may contain abbreviations or verbiage that are unfamiliar.              **Certification      PHYSICIAN Certification of Need for Inpatient Hospitalization - Initial Certification    Patient will require inpatient services that will reasonably be expected to span two midnight's based on the clinical  documentation in H+P.   Based on patients current state of illness, I anticipate that, after discharge, patient will require TBD.

## 2024-12-03 NOTE — CDS QUERY
Dear Dr. Romano,     Clinical information, provided below, indicates a BMI of 18.52 kg/m². Can you please further clarify in the medical record the diagnosis associated with these findings:    [  x ] Underweight    [   ] Other condition (please specify):      CLINICAL INFORMATION FROM THE MEDICAL RECORD    Risk Factors:      Clinical Indicators:   Ht 5'1'', Wt 98 lbs, BMI 18.52 on admission      Treatment:   Monitor oral intake       Use of terms such as suspected, possible, or probable (associated with a specific diagnosis that is being evaluated, monitored, or treated as if it exists) are acceptable and can be coded in the inpatient setting, when documented at the time of discharge.    For questions regarding this query, please contact Clinical : Sushma Sands RN at #[phone].    THIS FORM IS A PERMANENT PART OF THE MEDICAL RECORD

## 2024-12-03 NOTE — CDS QUERY
Dear Dr. Romano,     Clinical information includes a diagnosis of HYPONATREMIA. Based on your judgement and review of the clinical findings, can you please specify further the diagnosis treated?    [  x ] Hyponatremia without clinical significance   [   ] Clinically significant hyponatremia   [   ] Other (please specify):       CLINICAL INFORMATION FROM THE MEDICAL RECORD    Risk Factors:  Decreased oral intake    Clinical Indicators:   Na 135 on admission, corrected Na 135 with gluc 135  Patient alert and oriented x4, appropriate judgment, follows commands, GCS 15  No headache, N/V, muscle weakness, spasms or seizure activity present     Treatment:   Repeat BMP  IVF     Use of terms such as suspected, possible, or probable (associated with a specific diagnosis that is being evaluated, monitored, or treated as if it exists) are acceptable and can be coded in the inpatient setting, when documented at the time of discharge.    For questions regarding this query, please contact Clinical : Sushma Sands RN at #[phone].    THIS FORM IS A PERMANENT PART OF THE MEDICAL RECORD

## 2024-12-03 NOTE — PAYOR COMM NOTE
--------------  ADMISSION REVIEW     Payor: [employer]  Subscriber #:  [id number]  Authorization Number: [id number]    Admit date: 12/1/24  Admit time: 11:13 PM       REVIEW DOCUMENTATION:     ED Provider Notes        ED Provider Notes signed by Jairo West MD at 12/1/2024 10:06 PM        Chief Complaint   Patient presents with    Abdomen/Flank Pain     Stated Complaint: abd pain x 1day    Subjective:   HPI      Patient is a 74-year-old female here with concern of bowel obstruction.  She had a history of hysterectomy for cervical cancer in 2009 followed by radiation with subsequent fistula formation of bowel obstruction requiring ileostomy and later a subsequent colostomy.  She is been suffering from diarrhea constipation.  Also has a history of CKD, neuropathy.  Has history of a vesicovaginal fistula status post and complex reconstruction and right hydronephrosis managed by nephrostomy tube.  Vomiting since yesterday.  Abdominal pain.  Mainly in the lower abdomen.  Decreased output from ostomy.  Daughter concerned about bowel obstruction she has had this in the past.  No other complaints.    Objective:     Past Medical History:    Cancer (HCC)    cervicle    CKD (chronic kidney disease)       Past Surgical History:   Procedure Laterality Date    Other surgical history      BLAADER REMOVAL    Part removal colon w end colostomy         ED Triage Vitals [12/01/24 1810]   /83   Pulse 87   Resp 16   Temp 97.9 °F (36.6 °C)   Temp src Temporal   SpO2 96 %   O2 Device None (Room air)       Current Vitals:   Vital Signs  BP: 119/70  Pulse: 79  Resp: 16  Temp: 97.9 °F (36.6 °C)  Temp src: Temporal    Oxygen Therapy  SpO2: 100 %  O2 Device: None (Room air)        Physical Exam  Vitals and nursing note reviewed.   Constitutional:       General: She is not in acute distress.     Appearance: She is well-developed. She is not toxic-appearing.   HENT:      Head: Normocephalic and atraumatic.   Eyes:      General:  No scleral icterus.     Conjunctiva/sclera: Conjunctivae normal.   Cardiovascular:      Rate and Rhythm: Normal rate.   Pulmonary:      Effort: Pulmonary effort is normal. No respiratory distress.   Abdominal:      General: There is no distension.      Tenderness: There is abdominal tenderness (Colostomy bag relatively empty). There is no guarding or rebound.   Musculoskeletal:         General: No tenderness. Normal range of motion.      Cervical back: Normal range of motion and neck supple.   Skin:     General: Skin is warm and dry.      Findings: No rash.   Neurological:      Mental Status: She is alert and oriented to person, place, and time.      Motor: No abnormal muscle tone.      Coordination: Coordination normal.   Psychiatric:         Behavior: Behavior normal.            ED Course     Labs Reviewed   COMP METABOLIC PANEL (14) - Abnormal; Notable for the following components:       Result Value    Glucose 135 (*)     Sodium 135 (*)     Chloride 95 (*)     Creatinine 1.62 (*)     Calcium, Total 11.0 (*)     eGFR-Cr 34 (*)     AST 51 (*)      (*)     Bilirubin, Total 1.8 (*)     Albumin 5.3 (*)     All other components within normal limits   CBC WITH DIFFERENTIAL WITH PLATELET - Abnormal; Notable for the following components:    Neutrophil Absolute Prelim 7.91 (*)     Neutrophil Absolute 7.91 (*)     Lymphocyte Absolute 0.51 (*)     All other components within normal limits   LACTIC ACID, PLASMA - Abnormal; Notable for the following components:    Lactic Acid 2.4 (*)     All other components within normal limits   LIPASE - Normal   URINALYSIS, ROUTINE   LACTIC ACID REFLEX POST POSTIVE        Labs Reviewed   COMP METABOLIC PANEL (14) - Abnormal; Notable for the following components:       Result Value    Glucose 135 (*)     Sodium 135 (*)     Chloride 95 (*)     Creatinine 1.62 (*)     Calcium, Total 11.0 (*)     eGFR-Cr 34 (*)     AST 51 (*)      (*)     Bilirubin, Total 1.8 (*)     Albumin 5.3  (*)     All other components within normal limits   CBC WITH DIFFERENTIAL WITH PLATELET - Abnormal; Notable for the following components:    Neutrophil Absolute Prelim 7.91 (*)     Neutrophil Absolute 7.91 (*)     Lymphocyte Absolute 0.51 (*)     All other components within normal limits   LACTIC ACID, PLASMA - Abnormal; Notable for the following components:    Lactic Acid 2.4 (*)     All other components within normal limits   LIPASE - Normal   URINALYSIS, ROUTINE   LACTIC ACID REFLEX POST POSTIVE     Slightly elevated lactic.  BUN is normal creatinine is 1.6 potassium is normal sodium 135.  Lipase normal.  Patient was given IV fluids, antiemetics, pain control.  NG tube has been ordered for placement.  Discussed with general surgery and hospitalist patient will be admitted for further care.    Clinical Impression:  1. SBO (small bowel obstruction) ([facility])         Disposition:  Admit     Present on Admission  Date Reviewed: 12/1/2024            ICD-10-CM Noted POA    * (Principal) SBO (small bowel obstruction) ([facility]) K56.609 12/1/2024 Unknown              12/1 H&P  Chief Complaint n/v        Subjective:  History of Present Illness:      Amnol Gonzalez is a 74 year old female with PMHx cervical ca (s/p radiation, colostomy, ileostomy)/ HLD/ who presented to the hospital for nausea and emesis. Mandarin  was utilized to help obtain history. She reports beginning to have problems with nausea and emesis starting this morning. She was not able to drink or eat without vomiting shortly thereafter. She had some abdominal discomfort only prior to vomiting which then resolved after emesis. She notes her colostomy last had outpt in the early morning and then nothing since then. She notes chills but denied any fever. She reports one obstruction in the past after her colostomy surgery.        Objective:  Physical Exam:    /70   Pulse 79   Temp 97.9 °F (36.6 °C) (Temporal)   Resp 16   Ht 5' 1\" (1.549 m)   Wt 98 lb  (44.5 kg)   SpO2 100%   BMI 18.52 kg/m²   General: No acute distress, Alert  Respiratory: No rhonchi, no wheezes  Cardiovascular: S1, S2. Regular rate and rhythm  Abdomen: Soft, Non-tender, non-distended, positive bowel sounds, colostomy bag on left mid abdomen with air and mild stool    Assessment & Plan:  #Small bowel obstruction  #hyperbilirubinemia  #lactic acidosis  #elevated aminotransferases  -AST 51, , Tbili 1.8  -lactate 2.4  -CT showing dilated fluid filled loops of small bowel in abdomen and pelvis with multiple areas of surgical clips consistent with SBO  -NPO  -NG tube placed in ED  -pain control: tylenol first then opiates prn  -zofran prn  -cont to trend LFT's/lactate  -NSS@100 mls/h  -general surgery c/s in ED     #CKD 3b  -Cr 1.62: baseline 1.36 with GFR 41  -less than 1.5 x elevation  -cont to trend BMP  -strict I/O, avoid nephrotoxins  -hold home bicarb tabs while NPO and monitor for acidosis     #HLD  -hold home statin while NPO     #insomnia-hold home trazodone while NPO     #iron deficiency  -hold home iron supplement while NPO     #chronic pain  -hold home tizanidine        12/2 consult Surgery    Reason for Consultation:  Small bowel obstruction     Chief Complaint:  Decreased ostomy output     History of Present Illness:  Anmol Gonzalez is a 74 year old female who presents to [facility] with decreased ostomy output.     Language line services were used during today's visit as the patient is primarily Mandarin speaking ([id number]).     The patient has a history of cervical cancer for which she completed neoadjuvant chemoradiation in 2005 followed by hysterectomy in 2006. This was complicated by vagino-vesical fistula. She underwent surgery to fix the fistula, but had urinary leakage through her vagina.      She presented to [facility]  in 2019 with a small bowel obstruction and underwent lysis of adhesions. Her procedure was complicated by enterotomy's and ultimately  underwent ileocecetomy.      She was taken back to the OR 2 times for presumed leak, abdominal washout, and wound VAC dressing changes. One procedure involved sigmoid colon resection and creation of diverting ileostomy. She developed short bowel syndrome and had high output from her fistula. She was started on Lomotil and has intermittent IV fluid rehydration.      In 2021, the patient underwent CT of the abdomen and pelvis which revealed a distended urinary bladder with bladder wall thickening and posterior wall defect.     She underwent pelvic exenteration, segmental small bowel resection x 2, bilateral gracilis muscle flaps for perineal reconstruction, cystectomy, cutaneous ureterostomy, transcutaneous uteterostomy left to right on 11/14/2022 in [place], [place].     Over the past few days, the patient noticed decreased ostomy output. Per the ER notes, the patient reported nausea and emesis this morning.     Upon presentation to the hospital, the patient was hemodynamically stable.  CBC revealed WBC 8.9 with left shift of 7.91, hemoglobin 14.3, platelets 230,000. Slightly hyponatremic at 135, hypokalemic at 95. BUN 17, creatinine 1.62. Ast 51, , alkaline phosphatase 63. Total bilirubin elevated at 1.8.     CT of the abdomen and pelvis reveals dilation fluid filled loops of small bowel with multiple areas of surgical clips. Findings are consistent with small bowel obstruction. Cholelithiasis with distended gallbladder. The common bile duct measures 8 mm.     Currently, the patient is resting in bed. NG tube was placed in the ER. She denies abdominal pain, nausea, and vomiting. She has not noticed increased ostomy output since NG tube placement.     She has a significant past surgical history as listed above. She has a significant past medical history of cervical cancer and chronic kidney disease. She denies taking blood thinning medications.        History:  Past Medical History       Past Medical History:     Cancer (HCC)     cervicle    CKD (chronic kidney disease)         Past Surgical History         Past Surgical History:   Procedure Laterality Date    Other surgical history         BLAADER REMOVAL    Part removal colon w end colostomy             Family History         Family History   Problem Relation Age of Onset    Stroke Father      Stroke Mother            reports that she has never smoked. She has never used smokeless tobacco. She reports that she does not drink alcohol and does not use drugs.     Allergies:  [Allergies]    [Allergies]       Allergen Reactions    Famotidine NAUSEA AND VOMITING        Medications:  Prescriptions Prior to Admission        Medications Prior to Admission   Medication Sig    polyethylene glycol, PEG 3350, 17 g Oral Powd Pack Take 17 g by mouth daily.    clindamycin 300 MG Oral Cap Take 1 capsule (300 mg total) by mouth every 8 (eight) hours.    rosuvastatin 5 MG Oral Tab Take 1 tablet (5 mg total) by mouth daily.    traZODone 50 MG Oral Tab Take 1 tablet (50 mg total) by mouth nightly.    Ferrous Sulfate 325 (65 Fe) MG Oral Tab Take 1 tablet (325 mg total) by mouth daily with breakfast.    Ascorbic Acid (VITAMIN C OR) Take by mouth daily.    Zolpidem Tartrate ER (AMBIEN CR) 6.25 MG Oral Tab CR Take 1 tablet (6.25 mg total) by mouth nightly as needed for Sleep.    Coenzyme Q10 100 MG Oral Cap Take 300 mg by mouth daily.    loperamide 2 MG Oral Cap Take 1 capsule (2 mg total) by mouth as needed.    omega-3 fatty acids 1000 MG Oral Cap Take 1,000 mg by mouth daily.    sodium bicarbonate 650 MG Oral Tab Take 1 tablet (650 mg total) by mouth 2 (two) times daily.    tiZANidine 2 MG Oral Tab Take 1-2 tablets (2-4 mg total) by mouth 2 (two) times daily as needed.    SF 5000 PLUS 1.1 % Dental Cream Apply a thin ribbon of paste to a toothbrush. Brush thoroughly once daily for 2 minutes, preferably at bedtime. Spit the paste out after use and do not eat, drink, or rinse for 30 minutes. Do not  swallow the paste. Use regular toothpaste in the morning.              Current Hospital Medications      Current Facility-Administered Medications:     pantoprazole (Protonix) 40 mg in sodium chloride 0.9% PF 10 mL IV push, 40 mg, Intravenous, Q12H    potassium chloride 40 mEq in 250mL sodium chloride 0.9% IVPB premix, 40 mEq, Intravenous, Once **FOLLOWED BY** potassium chloride 20 mEq/100mL IVPB premix 20 mEq, 20 mEq, Intravenous, Once    sodium chloride 0.9% infusion, , Intravenous, Continuous    sodium chloride 0.9 % IV bolus 1,000 mL, 1,000 mL, Intravenous, Once    acetaminophen (Ofirmev) 10 mg/mL infusion premix 750 mg, 750 mg, Intravenous, Q6H PRN    sodium chloride 0.9% infusion, , Intravenous, Continuous    HYDROmorphone (Dilaudid) 1 MG/ML injection 0.2 mg, 0.2 mg, Intravenous, Q4H PRN **OR** HYDROmorphone (Dilaudid) 1 MG/ML injection 0.4 mg, 0.4 mg, Intravenous, Q4H PRN **OR** HYDROmorphone (Dilaudid) 1 MG/ML injection 0.8 mg, 0.8 mg, Intravenous, Q4H PRN    ondansetron (Zofran) 4 MG/2ML injection 4 mg, 4 mg, Intravenous, Q6H PRN    metoclopramide (Reglan) 5 mg/mL injection 5 mg, 5 mg, Intravenous, Q8H PRN    glycerin-hypromellose- (Artificial Tears) 0.2-0.2-1 % ophthalmic solution 1 drop, 1 drop, Both Eyes, QID PRN    sodium chloride (Saline Mist) 0.65 % nasal solution 1 spray, 1 spray, Each Nare, Q3H PRN    enoxaparin (Lovenox) 30 MG/0.3ML SUBQ injection 30 mg, 30 mg, Subcutaneous, Daily     Gastrointestinal:  Positive for abdominal pain, nausea, vomiting.         Physical Exam:  Blood pressure (!) 162/66, pulse 79, temperature 99 °F (37.2 °C), temperature source Oral, resp. rate 16, height 61\", weight 98 lb (44.5 kg), SpO2 97%, not currently breastfeeding.      HEENT: NG tube in place Without scleral icterus.  Mucous membranes are moist. EOM are WNL.        Abdomen: Soft, non-distended, non-tender to palpation, tympanic to percussion. No peritoneal signs. No guarding.  Left-sided ostomy in place  with scant stool, stoma pink, midline incision appreciated          Recent Labs   Lab 12/01/24  1829 12/02/24  0549   RBC 4.46 3.71*   HGB 14.3 12.0   HCT 40.3 34.0*   MCV 90.4 91.6   MCH 32.1 32.3   MCHC 35.5 35.3   RDW 12.8 12.9   NEPRELIM 7.91* 3.79   WBC 8.9 5.0   .0 181.0              Recent Labs   Lab 12/01/24  1829 12/02/24  0549   * 108*   BUN 17 18   CREATSERUM 1.62* 1.50*   CA 11.0* 9.6   ALB 5.3* 4.2   * 143   K 3.9 3.0*   CL 95* 101   CO2 26.0 30.0   ALKPHO 63 50*   AST 51* 35*   * 68*   BILT 1.8* 1.5*   TP 8.2 6.7            No results for input(s): \"PTP\", \"INR\", \"PTT\" in the last 168 hours.        XR CHEST AP PORTABLE  (CPT=71045)     Result Date: 12/1/2024  CONCLUSION:  Normal heart size and pulmonary vascularity.  Enteric tube tip in stomach.  Right-sided nephrostomy tube.  Slight atelectasis in the retrocardiac left lung base.   LOCATION:  [facility]      Dictated by (CST): Cristina Velez MD on 12/01/2024 at 10:34 PM     Finalized by (CST): Cristina Velez MD on 12/01/2024 at 10:35 PM        CT ABDOMEN+PELVIS KIDNEYSTONE 2D RNDR(NO IV,NO ORAL)(CPT=74176)     Result Date: 12/1/2024  CONCLUSION:  Postsurgical changes of left-sided descending colostomy.  The right side of the colon appears decompressed.  There are dilated fluid-filled loops of small bowel in the abdomen and pelvis with multiple areas of surgical clips.  Findings are consistent with small bowel obstruction.  Cholelithiasis.  There is a right percutaneous nephrostomy with mild right-sided caliectasis.  Cortical scarring left kidney with left pyloric aily act assist.  There is a surgical clip in the vicinity of the proximal to mid left ureter.  Cystectomy and hysterectomy.    LOCATION:  [facility]   Dictated by (CST): Cristina Velez MD on 12/01/2024 at 8:57 PM     Finalized by (CST): Cristina Velez MD on 12/01/2024 at 9:14 PM            GARY Pablo  12/2/2024  8:31 AMImpression:      Patient Active Problem List    Diagnosis    Hydronephrosis, right    Colostomy in place (HCC)    Stage 3a chronic kidney disease (HCC)    Elevated lipoprotein A level    Hyperlipidemia    Elevated blood pressure reading    Insomnia    Frequent diarrhea    Heart murmur    SBO (small bowel obstruction) (HCC)         This is a 74-year-old woman with small bowel obstruction     Patient has a significant abdominal surgical history including hysterectomy for cervical cancer followed by chemoradiation in 2006  She then had another episode of bowel obstruction requiring exploratory laparotomy which was complicated by an enterotomy with a leak needing multiple takeback's for abdominal washouts  Patient was given an ileostomy during this time, this was done in 2019  Patient had high output from her ileostomy and 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  Patient then underwent pelvic exenteration with bowel resection and muscle flap reconstruction in 2022  Patient's prior surgeries were done in the [facility]     She comes in last night with decreased ostomy output  Patient denies any abdominal pain, nausea, or vomiting  She was concerned that she had another bowel obstruction and presented to the emergency room  Patient had CT scan done  I reviewed these images personally and it shows dilated loops of small bowel throughout the patient's abdomen with decompressed colon, no obvious transition point  On physical exam, patient denies any abdominal pain  She does not appear distended  NG tube was placed with 1100 out since  Patient denies any increased output from her ostomy     No acute surgical intervention at this time  Recommend continue conservative treatment with NG tube decompression  Encourage ambulation and mobilization  Due to patient's extensive abdominal surgical history, she would be at very high risk for postoperative abdominal complications  We will try conservative treatment but if patient does not progress, a long  discussion about potential surgical intervention will need to be done with patient and patient's family  Surgery will continue to follow  Rest of care per primary      12/2 IM    Chief Complaint: nausea vomiting abd pain        Subjective:  Patient still with abd pain   ital signs:  Temp:  [97.9 °F (36.6 °C)-99 °F (37.2 °C)] 99 °F (37.2 °C)  Pulse:  [79-87] 79  Resp:  [16] 16  BP: (119-166)/(66-83) 162/66  SpO2:  [94 %-100 %] 97 %     Physical Exam:    General: No acute distress  Respiratory: No wheezes, no rhonchi  Cardiovascular: S1, S2, regular rate and rhythm  Abdomen: Soft, Non-tender, non-distended, positive bowel sounds          Assessment & Plan:  #Small bowel obstruction  #hyperbilirubinemia better  #lactic acidosis better  #elevated aminotransferases better  -AST 51, , Tbili 1.8     -CT showing dilated fluid filled loops of small bowel in abdomen and pelvis with multiple areas of surgical clips consistent with SBO  -NPO  -NG tube   -pain control: tylenol first then opiates prn  -zofran prn  -cont to trend LFT's/lactate  -NSS@100 mls/h  -general surgery c/s      #CKD 3b  -Cr 1.62: baseline 1.36 with GFR 41  -less than 1.5 x elevation  -cont to trend BMP  -strict I/O, avoid nephrotoxins  -hold home bicarb tabs while NPO and monitor for acidosis     #HLD  -hold home statin while NPO     #insomnia-hold home trazodone while NPO     #iron deficiency  -hold home iron supplement while NPO       12/3 IM  Chief Complaint: nausea vomiting abd pain        Subjective:  Patient still with abd pain but better  NGT to LIS  No flatus         Assessment & Plan:  #Small bowel obstruction  #hyperbilirubinemia better  #lactic acidosis better  #elevated aminotransferases better  -AST 51, , Tbili 1.8     -CT showing dilated fluid filled loops of small bowel in abdomen and pelvis with multiple areas of surgical clips consistent with SBO  -NPO  -NG tube   -pain control: tylenol first then opiates prn  -zofran  prn  -cont to trend LFT's/lactate  -NSS@100 mls/h  -general surgery c/s      #CKD 3b  -Cr 1.62: baseline 1.36 with GFR 41  -less than 1.5 x elevation  -cont to trend BMP  -strict I/O, avoid nephrotoxins  -hold home bicarb tabs while NPO and monitor for acidosis     #HLD  -hold home statin while NPO     #insomnia-hold home trazodone while NPO     #iron deficiency  -hold home iron supplement while NPO     #chronic pain  -hold home tizanidine    12/3 Surgery    Objective/Physical Exam:  General: Alert, orientated x3.  Cooperative.  No apparent distress.  Vital Signs:  Blood pressure (!) 172/72, pulse 70, temperature 98.7 °F (37.1 °C), temperature source Oral, resp. rate 16, height 61\", weight 98 lb (44.5 kg), SpO2 98%, not currently breastfeeding.  HEENT: Normocephalic, atraumatic. No scleral icterus.  Abdomen:  Soft, non-distended, non-tender, with no rebound or guarding.  No peritoneal signs.  Ostomy in left abdomen with hard stool in appliance.         Assessment/Plan:      Patient Active Problem List   Diagnosis    Hydronephrosis, right    Colostomy in place ([facility])    Stage 3a chronic kidney disease (HCC)    Elevated lipoprotein A level    Hyperlipidemia    Elevated blood pressure reading    Insomnia    Frequent diarrhea    Heart murmur    SBO (small bowel obstruction) ([facility])      Small bowel obstruction     Continue NG tube to low intermittent suction.  Glycerin suppository via ostomy today to stimulate bowel function.  Continue pain control and antiemetics as needed.  Encourage ambulation and up to chair.  DVT prophylaxis with Lovenox.  GI prophylaxis with Protonix.       No acute events overnight  Patient states she feels the same  She does report seeing a little bit more output from her ostomy  She denies any nausea vomiting  NG tube with approximately 200 cc out  No acute surgical intervention  Continue NG tube decompression  Will give suppository through ostomy to help stimulate a bowel movement     Patient's  daughter was present for discussion  Prior to presentation, patient had what appeared to be some constipation  Her normal ostomy output is liquid more like diarrhea  Patient had harder and more formed stool  She went to Dr. Dubose who gave her MiraLAX and after 3 days, patient presented with concerns for bowel obstruction  Patient apparently has had issues with laxatives leading to obstructions.  I discussed with the daughter that for now, we will continue conservative management and just do a suppository through the ostomy  Patient most likely has a partial obstruction due to constipation     Surgery will continue to follow      MEDICATIONS ADMINISTERED IN LAST 1 DAY:  dextrose 5%-sodium chloride 0.9% infusion       Date Action Dose Route User    12/3/2024 1245 New Bag (none) Intravenous Anneliese David RN          enoxaparin (Lovenox) 30 MG/0.3ML SUBQ injection 30 mg       Date Action Dose Route User    12/3/2024 0852 Given 30 mg Subcutaneous (Right Lower Abdomen) Anneliese David RN          glycerin (Laxitive) 2 g adult rectal suppository 1 suppository       Date Action Dose Route User    12/3/2024 1101 Given 1 suppository Rectal Anneliese David RN          pantoprazole (Protonix) 40 mg in sodium chloride 0.9% PF 10 mL IV push       Date Action Dose Route User    12/3/2024 0852 Given 40 mg Intravenous Anneliese David RN    12/2/2024 2016 Given 40 mg Intravenous Steph Fernandes RN                          Lidocaine Viscous HCl (XYLOCAINE) 2 % mouth solution 10 mL  Dose: 10 mL  Freq: Once Route: MT  Start: 12/01/24 2128 End: 12/01/24 2128 2128 MT-Given          ondansetron (Zofran) 4 MG/2ML injection 4 mg  Dose: 4 mg  Freq: Once Route: IV  Start: 12/01/24 1816 End: 12/01/24 1857   Admin Instructions:   For nausea and vomiting.           1857 BS-Given          ondansetron (Zofran-ODT) disintegrating tab 4 mg  Dose: 4 mg  Freq: Once Route: OR  Start: 12/01/24 1821 End: 12/01/24 1827           (1821  MT)-Not Given     1827 DM-Given              sodium chloride 0.9 % IV bolus 1,000 mL  Dose: 1,000 mL  Freq: Once Route: IV  Start: 12/01/24 1914           (1914 MT)-Not Given     1924 BS-Rate/Dose Change                             sodium chloride 0.9% infusion  Rate: 100 mL/hr  Freq: Continuous Route: IV  Start: 12/01/24 2330 End: 12/03/24 1241            0007 JH-New Bag      1241-D/C'd      sodium chloride 0.9% infusion  Rate: 125 mL/hr  Freq: Continuous Route: IV  Start: 12/01/24 1816           [address] [employer]                Vitals (last day)       Date/Time Temp Pulse Resp BP SpO2 Weight O2 Device O2 Flow Rate (L/min) [facility]    12/03/24 1115 98.7 °F (37.1 °C) 74 16 175/66 -- -- None (Room air) -- TL    12/03/24 0730 98.7 °F (37.1 °C) 70 16 172/72 98 % -- None (Room air) -- TL    12/03/24 0403 98.4 °F (36.9 °C) 62 18 158/58 96 % -- None (Room air) -- AN    12/02/24 2350 98 °F (36.7 °C) 63 16 159/75 97 % -- None (Room air) -- AN    12/02/24 1935 99.4 °F (37.4 °C) 71 18 154/73 98 % -- None (Room air) -- AN    12/02/24 1700 98.6 °F (37 °C) 72 16 163/71 99 % -- None (Room air) -- RS    12/02/24 1100 99 °F (37.2 °C) 77 16 148/66 96 % -- None (Room air) -- RS    12/02/24 0700 99 °F (37.2 °C) 79 16 162/66 97 % -- None (Room air) -- RS    12/02/24 0452 -- 79 -- -- 94 % -- -- --     12/02/24 0452 98.6 °F (37 °C) -- 16 166/69 -- -- None (Room air) -- KM    12/02/24 0355 -- -- -- -- -- 98 lb (44.5 kg) -- --             12/01/24 2334 98 °F (36.7 °C) 79 16 162/75 Abnormal  98 % -- None (Room air) --    12/01/24 1902 -- 79 16 119/70 100 % -- None (Room air) -- BS

## 2024-12-03 NOTE — PLAN OF CARE
Pt Aox4, mandarin speaking. R NG 55cm- low intermittent suction. RA, NSR on tele, lovenox. LUQ colostomy w no o.p. R nephrostomy. IV PPI. IVF. Up ad cynthia. NPO. Pt updated on poc. Call light within reach, safety precautions in place. POC continues.     Problem: Patient/Family Goals  Goal: Patient/Family Long Term Goal  Description: Patient's Long Term Goal:   Get ng tube removed    Interventions:  - General surgery  - IVF  - See additional Care Plan goals for specific interventions  Outcome: Progressing  Goal: Patient/Family Short Term Goal  Description: Patient's Short Term Goal:   12/1 noc: NG suction  12/2noc: NG suction, sleep    Interventions:   - low intermittent  - IVF  - See additional Care Plan goals for specific interventions  Outcome: Progressing     Problem: RESPIRATORY - ADULT  Goal: Achieves optimal ventilation and oxygenation  Description: INTERVENTIONS:  - Assess for changes in respiratory status  - Assess for changes in mentation and behavior  - Position to facilitate oxygenation and minimize respiratory effort  - Oxygen supplementation based on oxygen saturation or ABGs  - Provide Smoking Cessation handout, if applicable  - Encourage broncho-pulmonary hygiene including cough, deep breathe, Incentive Spirometry  - Assess the need for suctioning and perform as needed  - Assess and instruct to report SOB or any respiratory difficulty  - Respiratory Therapy support as indicated  - Manage/alleviate anxiety  - Monitor for signs/symptoms of CO2 retention  Outcome: Progressing     Problem: GASTROINTESTINAL - ADULT  Goal: Minimal or absence of nausea and vomiting  Description: INTERVENTIONS:  - Maintain adequate hydration with IV or PO as ordered and tolerated  - Nasogastric tube to low intermittent suction as ordered  - Evaluate effectiveness of ordered antiemetic medications  - Provide nonpharmacologic comfort measures as appropriate  - Advance diet as tolerated, if ordered  - Obtain nutritional consult as  needed  - Evaluate fluid balance  Outcome: Progressing  Goal: Maintains or returns to baseline bowel function  Description: INTERVENTIONS:  - Assess bowel function  - Maintain adequate hydration with IV or PO as ordered and tolerated  - Evaluate effectiveness of GI medications  - Encourage mobilization and activity  - Obtain nutritional consult as needed  - Establish a toileting routine/schedule  - Consider collaborating with pharmacy to review patient's medication profile  Outcome: Progressing  Goal: Maintains adequate nutritional intake (undernourished)  Description: INTERVENTIONS:  - Monitor percentage of each meal consumed  - Identify factors contributing to decreased intake, treat as appropriate  - Assist with meals as needed  - Monitor I&O, WT and lab values  - Obtain nutritional consult as needed  - Optimize oral hygiene and moisture  - Encourage food from home; allow for food preferences  - Enhance eating environment  Outcome: Progressing  Goal: Achieves appropriate nutritional intake (bariatric)  Description: INTERVENTIONS:  - Monitor for over-consumption  - Identify factors contributing to increased intake, treat as appropriate  - Monitor I&O, WT and lab values  - Obtain nutritional consult as needed  - Evaluate psychosocial factors contributing to over-consumption  Outcome: Progressing

## 2024-12-04 LAB
ANION GAP SERPL CALC-SCNC: 7 MMOL/L (ref 0–18)
ATRIAL RATE: 62 BPM
BUN BLD-MCNC: 14 MG/DL (ref 9–23)
CALCIUM BLD-MCNC: 9 MG/DL (ref 8.7–10.4)
CHLORIDE SERPL-SCNC: 116 MMOL/L (ref 98–112)
CO2 SERPL-SCNC: 27 MMOL/L (ref 21–32)
CREAT BLD-MCNC: 1.24 MG/DL
EGFRCR SERPLBLD CKD-EPI 2021: 46 ML/MIN/1.73M2 (ref 60–?)
GLUCOSE BLD-MCNC: 138 MG/DL (ref 70–99)
MAGNESIUM SERPL-MCNC: 1.9 MG/DL (ref 1.6–2.6)
OSMOLALITY SERPL CALC.SUM OF ELEC: 313 MOSM/KG (ref 275–295)
P AXIS: 91 DEGREES
P-R INTERVAL: 156 MS
PHOSPHATE SERPL-MCNC: 1.9 MG/DL (ref 2.4–5.1)
POTASSIUM SERPL-SCNC: 2.4 MMOL/L (ref 3.5–5.1)
Q-T INTERVAL: 464 MS
QRS DURATION: 78 MS
QTC CALCULATION (BEZET): 470 MS
R AXIS: 138 DEGREES
SODIUM SERPL-SCNC: 150 MMOL/L (ref 136–145)
T AXIS: 133 DEGREES
VENTRICULAR RATE: 62 BPM

## 2024-12-04 PROCEDURE — 99232 SBSQ HOSP IP/OBS MODERATE 35: CPT | Performed by: STUDENT IN AN ORGANIZED HEALTH CARE EDUCATION/TRAINING PROGRAM

## 2024-12-04 RX ORDER — HYDRALAZINE HYDROCHLORIDE 20 MG/ML
10 INJECTION INTRAMUSCULAR; INTRAVENOUS EVERY 6 HOURS PRN
Status: DISCONTINUED | OUTPATIENT
Start: 2024-12-04 | End: 2024-12-06

## 2024-12-04 RX ORDER — BISACODYL 10 MG
10 SUPPOSITORY, RECTAL RECTAL ONCE
Status: COMPLETED | OUTPATIENT
Start: 2024-12-04 | End: 2024-12-04

## 2024-12-04 RX ORDER — POTASSIUM CHLORIDE 14.9 MG/ML
20 INJECTION INTRAVENOUS ONCE
Status: COMPLETED | OUTPATIENT
Start: 2024-12-04 | End: 2024-12-05

## 2024-12-04 RX ORDER — HYDRALAZINE HYDROCHLORIDE 20 MG/ML
20 INJECTION INTRAMUSCULAR; INTRAVENOUS EVERY 6 HOURS PRN
Status: DISCONTINUED | OUTPATIENT
Start: 2024-12-04 | End: 2024-12-04

## 2024-12-04 RX ORDER — DEXTROSE MONOHYDRATE 50 MG/ML
INJECTION, SOLUTION INTRAVENOUS CONTINUOUS
Status: DISCONTINUED | OUTPATIENT
Start: 2024-12-04 | End: 2024-12-04

## 2024-12-04 RX ORDER — DEXTROSE MONOHYDRATE 50 MG/ML
INJECTION, SOLUTION INTRAVENOUS CONTINUOUS
Status: DISCONTINUED | OUTPATIENT
Start: 2024-12-04 | End: 2024-12-06

## 2024-12-04 NOTE — PROGRESS NOTES
[facility]  Progress Note    Anmol Gonzalez Patient Status:  Inpatient    2/15/1950 MRN [id number]   Location [facility] 5NW-A Attending Indu Bailey MD   Hosp Day # 3 PCP Jess Esquivel MD     Subjective:  The patient was seen and examined at bedside. Her daughter assisted with translation. The patient states she feels \"okay\" today. She states she had a small amount of yesterday.    NG with 300 ml out overnight.     Objective/Physical Exam:  BP (!) 171/69   Pulse 52   Temp 98.5 °F (36.9 °C) (Oral)   Resp 18   Ht 61\"   Wt 98 lb (44.5 kg)   SpO2 97%   BMI 18.52 kg/m²     Intake/Output Summary (Last 24 hours) at 2024 0743  Last data filed at 2024 0620  Gross per 24 hour   Intake 4270 ml   Output 2250 ml   Net 2020 ml         General: Alert, oriented x3. No acute distress.  HEENT: Normocephalic, atraumatic. No scleral icterus. NG tube in place.   Pulmonary: No respiratory distress, effort normal.   Abdomen: mildly-distended, without tympany to percussion. Soft, non-tender to palpation. No rebound or guarding. No peritoneal signs.   Extremities: No lower extremity edema. No clubbing or cyanosis.   Skin: Warm, dry. No jaundice.       Labs:      Lab Results   Component Value Date    INR 1.0 2024    INR 1.0 2024             Assessment:  Patient Active Problem List   Diagnosis    Hydronephrosis, right    Colostomy in place ([facility])    Stage 3a chronic kidney disease ([facility])    Elevated lipoprotein A level    Hyperlipidemia    Elevated blood pressure reading    Insomnia    Frequent diarrhea    Heart murmur    SBO (small bowel obstruction) ([facility])     SBO    Plan:  Continue NG to LIS. Possible clamp trial tomorrow pending bowel function   Repeat suppository via ostomy today to stimulate bowel function   Antiemetics and analgesics as needed  Encourage ambulation and up to chair  DVT prophylaxis with lovenox  GI prophylaxis with protonix      The patient was discussed with Dr. Sagastume , and she is  in agreement with the assessment and plan. My total face time with this patient was 25 minutes. Greater than half of the visit was spent in counseling the patient on the above listed diagnoses and treatment options.     Eda Ruano PA-C  12/4/2024  7:43 AM    This note was initiated by Eda Ruano.  The PA saw the patient in conjunction with me. The PA performed a history, exam, and developed the assessment and plan in conjunction with me. I agree with the above note and have made changes which reflect my own history and physical, if necessary.    Patient had output from her ostomy after the suppository  She continues to deny any abdominal pain  No nausea vomiting  NG with 300 in the past 24 hours  Continue NG tube decompression  Will give another suppository through ostomy today  Once ostomy is functioning more, we will begin to advance patient  Plan of care discussed with patient's daughter, Christianne, via telephone  Surgery will continue to follow  Rest of care per primary    Najma Sagastume MD  [facility] [place] [place] [facility]  12/4/2024  8:39 AM

## 2024-12-04 NOTE — PLAN OF CARE
Pt A&Ox4. On RA. On  and tele. Vitals stable. NG tube at 55cm set to low intermittent suction. Nephrostomy ube in place. Ostomy in place. Small stool today post suppository. Pt updated on plan of care, all questions and concerns addressed, verbalized understanding. Safety precautions in place, no further needs at this time.     Problem: Patient/Family Goals  Goal: Patient/Family Long Term Goal  Description: Patient's Long Term Goal:   Get ng tube removed    Interventions:  - General surgery  - IVF  - See additional Care Plan goals for specific interventions  Outcome: Progressing  Goal: Patient/Family Short Term Goal  Description: Patient's Short Term Goal:   12/1 noc: NG suction  12/2noc: NG suction, sleep    Interventions:   - low intermittent  - IVF  - See additional Care Plan goals for specific interventions  Outcome: Progressing     Problem: RESPIRATORY - ADULT  Goal: Achieves optimal ventilation and oxygenation  Description: INTERVENTIONS:  - Assess for changes in respiratory status  - Assess for changes in mentation and behavior  - Position to facilitate oxygenation and minimize respiratory effort  - Oxygen supplementation based on oxygen saturation or ABGs  - Provide Smoking Cessation handout, if applicable  - Encourage broncho-pulmonary hygiene including cough, deep breathe, Incentive Spirometry  - Assess the need for suctioning and perform as needed  - Assess and instruct to report SOB or any respiratory difficulty  - Respiratory Therapy support as indicated  - Manage/alleviate anxiety  - Monitor for signs/symptoms of CO2 retention  Outcome: Progressing     Problem: GASTROINTESTINAL - ADULT  Goal: Minimal or absence of nausea and vomiting  Description: INTERVENTIONS:  - Maintain adequate hydration with IV or PO as ordered and tolerated  - Nasogastric tube to low intermittent suction as ordered  - Evaluate effectiveness of ordered antiemetic medications  - Provide nonpharmacologic comfort measures as  appropriate  - Advance diet as tolerated, if ordered  - Obtain nutritional consult as needed  - Evaluate fluid balance  Outcome: Progressing  Goal: Maintains or returns to baseline bowel function  Description: INTERVENTIONS:  - Assess bowel function  - Maintain adequate hydration with IV or PO as ordered and tolerated  - Evaluate effectiveness of GI medications  - Encourage mobilization and activity  - Obtain nutritional consult as needed  - Establish a toileting routine/schedule  - Consider collaborating with pharmacy to review patient's medication profile  Outcome: Progressing  Goal: Maintains adequate nutritional intake (undernourished)  Description: INTERVENTIONS:  - Monitor percentage of each meal consumed  - Identify factors contributing to decreased intake, treat as appropriate  - Assist with meals as needed  - Monitor I&O, WT and lab values  - Obtain nutritional consult as needed  - Optimize oral hygiene and moisture  - Encourage food from home; allow for food preferences  - Enhance eating environment  Outcome: Progressing  Goal: Achieves appropriate nutritional intake (bariatric)  Description: INTERVENTIONS:  - Monitor for over-consumption  - Identify factors contributing to increased intake, treat as appropriate  - Monitor I&O, WT and lab values  - Obtain nutritional consult as needed  - Evaluate psychosocial factors contributing to over-consumption  Outcome: Progressing

## 2024-12-04 NOTE — PROGRESS NOTES
[facility]   part of [facility]     Hospitalist Progress Note     Anmol Gonzalez Patient Status:  Inpatient    2/15/1950 MRN [id number]   formerly [facility] 5NW-A Attending Edmundo Varela MD   Hosp Day # 3 PCP Jess Esquivel MD     Chief Complaint: nausea vomiting abd pain    Subjective:     Patient has NGT, no pain at time of eam. Mandarin  used    Objective:    Review of Systems:   A comprehensive review of systems was completed; pertinent positive and negatives stated in subjective.    Vital signs:  Temp:  [98.3 °F (36.8 °C)-98.7 °F (37.1 °C)] 98.3 °F (36.8 °C)  Pulse:  [52-66] 59  Resp:  [16-18] 18  BP: (154-190)/(52-73) 170/60  SpO2:  [97 %] 97 %    Physical Exam:    General: No acute distress  Respiratory: No wheezes, no rhonchi  Cardiovascular: S1, S2, regular rate and rhythm  Abdomen: Soft, Non-tender, non-distended, positive bowel sounds  Neuro: No new focal deficits.   Extremities: No edema      Diagnostic Data:    Labs:  Recent Labs   Lab 24  0549   WBC 8.9 5.0   HGB 14.3 12.0   MCV 90.4 91.6   .0 181.0       Recent Labs   Lab 24  0549 24  1747   * 108*  --    BUN 17 18  --    CREATSERUM 1.62* 1.50*  --    CA 11.0* 9.6  --    ALB 5.3* 4.2  --    * 143  --    K 3.9 3.0* 3.7   CL 95* 101  --    CO2 26.0 30.0  --    ALKPHO 63 50*  --    AST 51* 35*  --    * 68*  --    BILT 1.8* 1.5*  --    TP 8.2 6.7  --        Estimated Creatinine Clearance: 23.8 mL/min (A) (based on SCr of 1.5 mg/dL (H)).    No results for input(s): \"TROP\", \"TROPHS\", \"CK\" in the last 168 hours.    No results for input(s): \"PTP\", \"INR\" in the last 168 hours.               Microbiology    No results found for this visit on 24.      Imaging: Reviewed in Epic.    Medications:    pantoprazole  40 mg Intravenous Q12H    sodium chloride  1,000 mL Intravenous Once    enoxaparin  30 mg Subcutaneous Daily       Assessment & Plan:      #Small  bowel obstruction  #hyperbilirubinemia better  #lactic acidosis better  #elevated aminotransferases better  -AST 51, , Tbili 1.8    -CT showing dilated fluid filled loops of small bowel in abdomen and pelvis with multiple areas of surgical clips consistent with SBO  -NPO  -NG tube   -pain control: tylenol first then opiates prn  -zofran prn  -cont to trend LFT's/lactate  -NSS@100 mls/h  -general surgery c/s      #CKD 3b  -Cr 1.62: baseline 1.36 with GFR 41  -less than 1.5 x elevation  -cont to trend BMP  -strict I/O, avoid nephrotoxins  -hold home bicarb tabs while NPO and monitor for acidosis     #HLD  -hold home statin while NPO     #insomnia-hold home trazodone while NPO     #iron deficiency  -hold home iron supplement while NPO     #chronic pain  -hold home tizanidine     #? 2nd degree AVB 12/4   EKG, BMP Mg      Indu Bailey MD    Supplementary Documentation:     Quality:  DVT Mechanical Prophylaxis:   SCDs,    DVT Pharmacologic Prophylaxis   Medication    enoxaparin (Lovenox) 30 MG/0.3ML SUBQ injection 30 mg                Code Status: Not on file  Pratt: No urinary catheter in place  Pratt Duration (in days):   Central line:    SHELLEY:     Discharge is dependent on: progress  At this point Ms. Gonzalez is expected to be discharge to: home    The 21st Century Cures Act makes medical notes like these available to patients in the interest of transparency. Please be advised this is a medical document. Medical documents are intended to carry relevant information, facts as evident, and the clinical opinion of the practitioner. The medical note is intended as peer to peer communication and may appear blunt or direct. It is written in medical language and may contain abbreviations or verbiage that are unfamiliar.              **Certification      PHYSICIAN Certification of Need for Inpatient Hospitalization - Initial Certification    Patient will require inpatient services that will reasonably be expected to span two  midnight's based on the clinical documentation in H+P.   Based on patients current state of illness, I anticipate that, after discharge, patient will require TBD.

## 2024-12-04 NOTE — PLAN OF CARE
Problem: SBO  Data: Ax04. Mandarin speaking. Telemetry-sinus rhythm. , on room air. Afebrile. Denied pain. Right nostril NG tube 55 marked. Low intermittent suction. NPO. IVF. Ambulatory.   Intervention: IVF, IV protonix  Education: medication, call light   Response: cooperative with care    Problem: Patient/Family Goals  Goal: Patient/Family Long Term Goal  Description: Patient's Long Term Goal:   Get ng tube removed    Interventions:  - General surgery  - IVF  - See additional Care Plan goals for specific interventions  Outcome: Progressing  Goal: Patient/Family Short Term Goal  Description: Patient's Short Term Goal:   12/1 noc: NG suction  12/2noc: NG suction, sleep  12/3 noc: reduce NG output    Interventions:   - low intermittent  - IVF  - See additional Care Plan goals for specific interventions  Outcome: Progressing     Problem: RESPIRATORY - ADULT  Goal: Achieves optimal ventilation and oxygenation  Description: INTERVENTIONS:  - Assess for changes in respiratory status  - Assess for changes in mentation and behavior  - Position to facilitate oxygenation and minimize respiratory effort  - Oxygen supplementation based on oxygen saturation or ABGs  - Provide Smoking Cessation handout, if applicable  - Encourage broncho-pulmonary hygiene including cough, deep breathe, Incentive Spirometry  - Assess the need for suctioning and perform as needed  - Assess and instruct to report SOB or any respiratory difficulty  - Respiratory Therapy support as indicated  - Manage/alleviate anxiety  - Monitor for signs/symptoms of CO2 retention  Outcome: Progressing     Problem: GASTROINTESTINAL - ADULT  Goal: Minimal or absence of nausea and vomiting  Description: INTERVENTIONS:  - Maintain adequate hydration with IV or PO as ordered and tolerated  - Nasogastric tube to low intermittent suction as ordered  - Evaluate effectiveness of ordered antiemetic medications  - Provide nonpharmacologic comfort measures as  appropriate  - Advance diet as tolerated, if ordered  - Obtain nutritional consult as needed  - Evaluate fluid balance  Outcome: Progressing  Goal: Maintains or returns to baseline bowel function  Description: INTERVENTIONS:  - Assess bowel function  - Maintain adequate hydration with IV or PO as ordered and tolerated  - Evaluate effectiveness of GI medications  - Encourage mobilization and activity  - Obtain nutritional consult as needed  - Establish a toileting routine/schedule  - Consider collaborating with pharmacy to review patient's medication profile  Outcome: Progressing  Goal: Maintains adequate nutritional intake (undernourished)  Description: INTERVENTIONS:  - Monitor percentage of each meal consumed  - Identify factors contributing to decreased intake, treat as appropriate  - Assist with meals as needed  - Monitor I&O, WT and lab values  - Obtain nutritional consult as needed  - Optimize oral hygiene and moisture  - Encourage food from home; allow for food preferences  - Enhance eating environment  Outcome: Progressing  Goal: Achieves appropriate nutritional intake (bariatric)  Description: INTERVENTIONS:  - Monitor for over-consumption  - Identify factors contributing to increased intake, treat as appropriate  - Monitor I&O, WT and lab values  - Obtain nutritional consult as needed  - Evaluate psychosocial factors contributing to over-consumption  Outcome: Progressing

## 2024-12-05 ENCOUNTER — APPOINTMENT (OUTPATIENT)
Dept: CV DIAGNOSTICS | Facility: HOSPITAL | Age: 74
End: 2024-12-05
Attending: STUDENT IN AN ORGANIZED HEALTH CARE EDUCATION/TRAINING PROGRAM
Payer: MEDICARE

## 2024-12-05 LAB
ANION GAP SERPL CALC-SCNC: 9 MMOL/L (ref 0–18)
BASOPHILS # BLD AUTO: 0.02 X10(3) UL (ref 0–0.2)
BASOPHILS NFR BLD AUTO: 0.4 %
BUN BLD-MCNC: 13 MG/DL (ref 9–23)
CALCIUM BLD-MCNC: 9 MG/DL (ref 8.7–10.4)
CHLORIDE SERPL-SCNC: 115 MMOL/L (ref 98–112)
CO2 SERPL-SCNC: 25 MMOL/L (ref 21–32)
CREAT BLD-MCNC: 1.28 MG/DL
EGFRCR SERPLBLD CKD-EPI 2021: 45 ML/MIN/1.73M2 (ref 60–?)
EOSINOPHIL # BLD AUTO: 0.03 X10(3) UL (ref 0–0.7)
EOSINOPHIL NFR BLD AUTO: 0.5 %
ERYTHROCYTE [DISTWIDTH] IN BLOOD BY AUTOMATED COUNT: 13.6 %
GLUCOSE BLD-MCNC: 119 MG/DL (ref 70–99)
HCT VFR BLD AUTO: 31.9 %
HGB BLD-MCNC: 10.9 G/DL
IMM GRANULOCYTES # BLD AUTO: 0.02 X10(3) UL (ref 0–1)
IMM GRANULOCYTES NFR BLD: 0.4 %
LYMPHOCYTES # BLD AUTO: 0.89 X10(3) UL (ref 1–4)
LYMPHOCYTES NFR BLD AUTO: 16.1 %
MCH RBC QN AUTO: 32.6 PG (ref 26–34)
MCHC RBC AUTO-ENTMCNC: 34.2 G/DL (ref 31–37)
MCV RBC AUTO: 95.5 FL
MONOCYTES # BLD AUTO: 0.59 X10(3) UL (ref 0.1–1)
MONOCYTES NFR BLD AUTO: 10.7 %
NEUTROPHILS # BLD AUTO: 3.97 X10 (3) UL (ref 1.5–7.7)
NEUTROPHILS # BLD AUTO: 3.97 X10(3) UL (ref 1.5–7.7)
NEUTROPHILS NFR BLD AUTO: 71.9 %
OSMOLALITY SERPL CALC.SUM OF ELEC: 309 MOSM/KG (ref 275–295)
PLATELET # BLD AUTO: 145 10(3)UL (ref 150–450)
POTASSIUM SERPL-SCNC: 3 MMOL/L (ref 3.5–5.1)
RBC # BLD AUTO: 3.34 X10(6)UL
SODIUM SERPL-SCNC: 149 MMOL/L (ref 136–145)
WBC # BLD AUTO: 5.5 X10(3) UL (ref 4–11)

## 2024-12-05 PROCEDURE — 99232 SBSQ HOSP IP/OBS MODERATE 35: CPT | Performed by: STUDENT IN AN ORGANIZED HEALTH CARE EDUCATION/TRAINING PROGRAM

## 2024-12-05 PROCEDURE — 99233 SBSQ HOSP IP/OBS HIGH 50: CPT | Performed by: STUDENT IN AN ORGANIZED HEALTH CARE EDUCATION/TRAINING PROGRAM

## 2024-12-05 PROCEDURE — 93306 TTE W/DOPPLER COMPLETE: CPT | Performed by: STUDENT IN AN ORGANIZED HEALTH CARE EDUCATION/TRAINING PROGRAM

## 2024-12-05 NOTE — PLAN OF CARE
Pt Aox4. NG to LIS. RA. NSR on tele, EP, lovenox. PRN hydralazine given for elevated BP. LUQ colostomy with minimal o.p, R nephrostomy. Up ad cynthia. IVF. NPO. Pt updated on POC. Call light within reach, safety precautions in place.   Problem: Patient/Family Goals  Goal: Patient/Family Long Term Goal  Description: Patient's Long Term Goal:   Get ng tube removed    Interventions:  - General surgery  - IVF  - See additional Care Plan goals for specific interventions  Outcome: Progressing  Goal: Patient/Family Short Term Goal  Description: Patient's Short Term Goal:   12/1 noc: NG suction  12/2noc: NG suction, sleep  12/3 noc: reduce NG output  12/4noc: sleep, decrease NG o.p    Interventions:   - low intermittent  - IVF  - See additional Care Plan goals for specific interventions  Outcome: Progressing     Problem: RESPIRATORY - ADULT  Goal: Achieves optimal ventilation and oxygenation  Description: INTERVENTIONS:  - Assess for changes in respiratory status  - Assess for changes in mentation and behavior  - Position to facilitate oxygenation and minimize respiratory effort  - Oxygen supplementation based on oxygen saturation or ABGs  - Provide Smoking Cessation handout, if applicable  - Encourage broncho-pulmonary hygiene including cough, deep breathe, Incentive Spirometry  - Assess the need for suctioning and perform as needed  - Assess and instruct to report SOB or any respiratory difficulty  - Respiratory Therapy support as indicated  - Manage/alleviate anxiety  - Monitor for signs/symptoms of CO2 retention  Outcome: Progressing     Problem: GASTROINTESTINAL - ADULT  Goal: Minimal or absence of nausea and vomiting  Description: INTERVENTIONS:  - Maintain adequate hydration with IV or PO as ordered and tolerated  - Nasogastric tube to low intermittent suction as ordered  - Evaluate effectiveness of ordered antiemetic medications  - Provide nonpharmacologic comfort measures as appropriate  - Advance diet as tolerated, if  ordered  - Obtain nutritional consult as needed  - Evaluate fluid balance  Outcome: Progressing  Goal: Maintains or returns to baseline bowel function  Description: INTERVENTIONS:  - Assess bowel function  - Maintain adequate hydration with IV or PO as ordered and tolerated  - Evaluate effectiveness of GI medications  - Encourage mobilization and activity  - Obtain nutritional consult as needed  - Establish a toileting routine/schedule  - Consider collaborating with pharmacy to review patient's medication profile  Outcome: Progressing  Goal: Maintains adequate nutritional intake (undernourished)  Description: INTERVENTIONS:  - Monitor percentage of each meal consumed  - Identify factors contributing to decreased intake, treat as appropriate  - Assist with meals as needed  - Monitor I&O, WT and lab values  - Obtain nutritional consult as needed  - Optimize oral hygiene and moisture  - Encourage food from home; allow for food preferences  - Enhance eating environment  Outcome: Progressing  Goal: Achieves appropriate nutritional intake (bariatric)  Description: INTERVENTIONS:  - Monitor for over-consumption  - Identify factors contributing to increased intake, treat as appropriate  - Monitor I&O, WT and lab values  - Obtain nutritional consult as needed  - Evaluate psychosocial factors contributing to over-consumption  Outcome: Progressing

## 2024-12-05 NOTE — PLAN OF CARE
Pt A&Ox4. Mandarin speaking. On RA. On  and tele. Vitals stable. NG, colostomy and nephrostomy tubes in place. Pt up ad cynthia. Inserted suppository in pts stoma and placed new ostomy bag. Pt had critical lab value, K 2.4. IV K replacement started. Per tele, pt had a 30 second run of SVT with a rate in the 150s, provider notified. Echo ordered. Pt still has NG to low intermittent suction. About 50ml output during my shift. Pt updated on plan of care, all questions and concerns addressed, verbalized understanding. Safety precautions in place, no further needs at this time.     Problem: Patient/Family Goals  Goal: Patient/Family Long Term Goal  Description: Patient's Long Term Goal:   Get ng tube removed    Interventions:  - General surgery  - IVF  - See additional Care Plan goals for specific interventions  Outcome: Progressing  Goal: Patient/Family Short Term Goal  Description: Patient's Short Term Goal:   12/1 noc: NG suction  12/2noc: NG suction, sleep  12/3 noc: reduce NG output    Interventions:   - low intermittent  - IVF  - See additional Care Plan goals for specific interventions  Outcome: Progressing     Problem: RESPIRATORY - ADULT  Goal: Achieves optimal ventilation and oxygenation  Description: INTERVENTIONS:  - Assess for changes in respiratory status  - Assess for changes in mentation and behavior  - Position to facilitate oxygenation and minimize respiratory effort  - Oxygen supplementation based on oxygen saturation or ABGs  - Provide Smoking Cessation handout, if applicable  - Encourage broncho-pulmonary hygiene including cough, deep breathe, Incentive Spirometry  - Assess the need for suctioning and perform as needed  - Assess and instruct to report SOB or any respiratory difficulty  - Respiratory Therapy support as indicated  - Manage/alleviate anxiety  - Monitor for signs/symptoms of CO2 retention  Outcome: Progressing     Problem: GASTROINTESTINAL - ADULT  Goal: Minimal or absence of nausea and  vomiting  Description: INTERVENTIONS:  - Maintain adequate hydration with IV or PO as ordered and tolerated  - Nasogastric tube to low intermittent suction as ordered  - Evaluate effectiveness of ordered antiemetic medications  - Provide nonpharmacologic comfort measures as appropriate  - Advance diet as tolerated, if ordered  - Obtain nutritional consult as needed  - Evaluate fluid balance  Outcome: Progressing

## 2024-12-05 NOTE — PLAN OF CARE
Patient is A+Ox4, Mandarin speaking. Maintaining sats >90% on RA. NSR on tele, Lovenox. Colostomy LUQ. Right Nephrostomy draining. NGT to Right nare- clamped. Attempting clear liquid diet per Gen Surg. Up ad cynthia. Patient and daughter updated on POC, keep NGT in place for now- eval tomorrow.     Problem: Patient/Family Goals  Goal: Patient/Family Long Term Goal  Description: Patient's Long Term Goal:   Get ng tube removed    Interventions:  - General surgery  - IVF  - See additional Care Plan goals for specific interventions  Outcome: Progressing  Goal: Patient/Family Short Term Goal  Description: Patient's Short Term Goal:   12/1 noc: NG suction  12/2noc: NG suction, sleep  12/3 noc: reduce NG output  12/4noc: sleep, decrease NG o.p  12/5 AM: remain comfortable    Interventions:   - low intermittent  - IVF  - See additional Care Plan goals for specific interventions  Outcome: Progressing     Problem: RESPIRATORY - ADULT  Goal: Achieves optimal ventilation and oxygenation  Description: INTERVENTIONS:  - Assess for changes in respiratory status  - Assess for changes in mentation and behavior  - Position to facilitate oxygenation and minimize respiratory effort  - Oxygen supplementation based on oxygen saturation or ABGs  - Provide Smoking Cessation handout, if applicable  - Encourage broncho-pulmonary hygiene including cough, deep breathe, Incentive Spirometry  - Assess the need for suctioning and perform as needed  - Assess and instruct to report SOB or any respiratory difficulty  - Respiratory Therapy support as indicated  - Manage/alleviate anxiety  - Monitor for signs/symptoms of CO2 retention  Outcome: Progressing     Problem: GASTROINTESTINAL - ADULT  Goal: Minimal or absence of nausea and vomiting  Description: INTERVENTIONS:  - Maintain adequate hydration with IV or PO as ordered and tolerated  - Nasogastric tube to low intermittent suction as ordered  - Evaluate effectiveness of ordered antiemetic  medications  - Provide nonpharmacologic comfort measures as appropriate  - Advance diet as tolerated, if ordered  - Obtain nutritional consult as needed  - Evaluate fluid balance  Outcome: Progressing  Goal: Maintains or returns to baseline bowel function  Description: INTERVENTIONS:  - Assess bowel function  - Maintain adequate hydration with IV or PO as ordered and tolerated  - Evaluate effectiveness of GI medications  - Encourage mobilization and activity  - Obtain nutritional consult as needed  - Establish a toileting routine/schedule  - Consider collaborating with pharmacy to review patient's medication profile  Outcome: Progressing  Goal: Maintains adequate nutritional intake (undernourished)  Description: INTERVENTIONS:  - Monitor percentage of each meal consumed  - Identify factors contributing to decreased intake, treat as appropriate  - Assist with meals as needed  - Monitor I&O, WT and lab values  - Obtain nutritional consult as needed  - Optimize oral hygiene and moisture  - Encourage food from home; allow for food preferences  - Enhance eating environment  Outcome: Progressing  Goal: Achieves appropriate nutritional intake (bariatric)  Description: INTERVENTIONS:  - Monitor for over-consumption  - Identify factors contributing to increased intake, treat as appropriate  - Monitor I&O, WT and lab values  - Obtain nutritional consult as needed  - Evaluate psychosocial factors contributing to over-consumption  Outcome: Progressing

## 2024-12-05 NOTE — PROGRESS NOTES
[facility]  Progress Note    Anmol Gonzalez Patient Status:  Inpatient    2/15/1950 MRN [id number]   Location [place] 5NW-A Attending Una Romano MD   Hosp Day # 4 PCP Jess Esquivel MD     Subjective:  Language line  was used.  Patient states she is overall feeling well today and without acute complaints.  NG remains to LIS.  She has had stool and flatus through ostomy.  She denies any abdominal pain, nausea, vomiting.  No fevers or chills.    Objective/Physical Exam:  General: Alert, orientated x3.  Cooperative.  No apparent distress.  Vital Signs:  Blood pressure 153/58, pulse 62, temperature 98.5 °F (36.9 °C), temperature source Oral, resp. rate 16, height 61\", weight 98 lb (44.5 kg), SpO2 98%, not currently breastfeeding.  Lungs: No respiratory distress.  Cardiac: Regular rate and rhythm.   Abdomen:  Soft, non distended, non tender, with no rebound or guarding.  No peritoneal signs.   Ostomy pink and productive of formed stool and flatus, 350 cc stool documented by nursing staff  Extremities:  No lower extremity edema noted.        Labs:  Lab Results   Component Value Date    WBC 5.5 2024    HGB 10.9 2024    HCT 31.9 2024    .0 2024     Lab Results   Component Value Date     2024    K 3.0 2024    K 3.0 2024     2024    CO2 25.0 2024    BUN 13 2024    CREATSERUM 1.28 2024     2024    CA 9.0 2024     Lab Results   Component Value Date    INR 1.0 2024    INR 1.0 2024       I/O last 3 completed shifts:  In: 4270 [I.V.:4270]  Out: 2760 [Urine:2100; Emesis/NG output:310; Stool:350]  No intake/output data recorded.    Assessment  Patient Active Problem List   Diagnosis    Hydronephrosis, right    Colostomy in place (HCC)    Stage 3a chronic kidney disease (HCC)    Elevated lipoprotein A level    Hyperlipidemia    Elevated blood pressure reading    Insomnia    Frequent diarrhea     Heart murmur    SBO (small bowel obstruction) (HCC)       SBO    Plan:  Patient has had return of bowel function.  Clamp trial today.  Clamp NG x 4 hours, if no nausea or vomiting and residual is less than 150 cc, may discontinue.  Will give one-time dose of milk of magnesia well NG is clamped  May start clear liquid diet if NG successfully removed today  Continue bowel regimen  Analgesics and antiemetics as needed  Ambulate and up to chair  DVT prophylaxis with Lovenox  GI prophylaxis with Protonix      Elaine Delarosa PA-C  12/5/2024  10:06 AM     This note was initiated by Elaine Delarosa.  The PA saw the patient in conjunction with me. The PA performed a history, exam, and developed the assessment and plan in conjunction with me. I agree with the above note and have made changes which reflect my own history and physical, if necessary.    No acute events overnight  NG tube with minimal output  Patient with more ostomy output for stool remains formed  Recommend clamping NG tube  Can start sips of clears  Dose of milk of magnesia to try to help increase ostomy output  Surgery will continue to follow  Rest of care per primary    Najma Sagastume MD  [facility]  12/5/2024  12:41 PM

## 2024-12-05 NOTE — PROGRESS NOTES
[facility]   part of [facility]     Hospitalist Progress Note     Anmol Gonzalez Patient Status:  Inpatient    2/15/1950 MRN [id number]   Location [facility] 5NW-A Attending Edmundo Varela MD   Hosp Day # 4 PCP Jess Esquivel MD     Chief Complaint: nausea vomiting abd pain    Subjective:     Patient undergoing Echo - SVT yesterday     Objective:    Review of Systems:   A comprehensive review of systems was NOT completed; pertinent positive and negatives stated in subjective.    Vital signs:  Temp:  [97.7 °F (36.5 °C)-99 °F (37.2 °C)] 99 °F (37.2 °C)  Pulse:  [57-70] 64  Resp:  [16-18] 16  BP: (153-184)/(58-80) 165/80  SpO2:  [98 %] 98 %    Physical Exam:    General: No acute distress  Respiratory: No wheezes, no rhonchi  Cardiovascular: S1, S2, regular rate and rhythm  Abdomen: Soft, Non-tender, non-distended, positive bowel sounds  Neuro: No new focal deficits.   Extremities: No edema      Diagnostic Data:    Labs:  Recent Labs   Lab 24  1829 24  0549 24  0452   WBC 8.9 5.0 5.5   HGB 14.3 12.0 10.9*   MCV 90.4 91.6 95.5   .0 181.0 145.0*       Recent Labs   Lab 24  1829 24  0549 24  1747 24  1527 24  0452   * 108*  --  138* 119*   BUN 17 18  --  14 13   CREATSERUM 1.62* 1.50*  --  1.24* 1.28*   CA 11.0* 9.6  --  9.0 9.0   ALB 5.3* 4.2  --   --   --    * 143  --  150* 149*   K 3.9 3.0* 3.7 2.4* 3.0*  3.0*   CL 95* 101  --  116* 115*   CO2 26.0 30.0  --  27.0 25.0   ALKPHO 63 50*  --   --   --    AST 51* 35*  --   --   --    * 68*  --   --   --    BILT 1.8* 1.5*  --   --   --    TP 8.2 6.7  --   --   --        Estimated Creatinine Clearance: 27.9 mL/min (A) (based on SCr of 1.28 mg/dL (H)).    No results for input(s): \"TROP\", \"TROPHS\", \"CK\" in the last 168 hours.    No results for input(s): \"PTP\", \"INR\" in the last 168 hours.               Microbiology    No results found for this visit on 24.      Imaging: Reviewed in  Epic.    Medications:    potassium phosphate dibasic 15 mmol in sodium chloride 0.9% 250 mL IVPB  15 mmol Intravenous Once    Followed by    potassium chloride  40 mEq Intravenous Once    pantoprazole  40 mg Intravenous Q12H    sodium chloride  1,000 mL Intravenous Once    enoxaparin  30 mg Subcutaneous Daily       Assessment & Plan:      #SVT on 12/4  HypoK on labs, replete  Echo within normal     #HyperNa  D5 W , monitor Na     #Small bowel obstruction  #hyperbilirubinemia better  #lactic acidosis better  #elevated aminotransferases better       #CKD 3b  -Cr 1.62: baseline 1.36 with GFR 41  -less than 1.5 x elevation  -cont to trend BMP  -strict I/O, avoid nephrotoxins  -hold home bicarb tabs while NPO and monitor for acidosis     #HLD  -hold home statin while NPO     #insomnia-hold home trazodone while NPO     #iron deficiency  -hold home iron supplement while NPO     #chronic pain  -hold home tizanidine     #? 2nd degree AVB 12/4   EKG, BMP Mg      Una Romano MD    Supplementary Documentation:     Quality:  DVT Mechanical Prophylaxis:   SCDs,    DVT Pharmacologic Prophylaxis   Medication    enoxaparin (Lovenox) 30 MG/0.3ML SUBQ injection 30 mg                Code Status: Not on file  Pratt: No urinary catheter in place  Pratt Duration (in days):   Central line:    SHELLEY:     Discharge is dependent on: progress  At this point Ms. Gonzalez is expected to be discharge to: home    The 21st Century Cures Act makes medical notes like these available to patients in the interest of transparency. Please be advised this is a medical document. Medical documents are intended to carry relevant information, facts as evident, and the clinical opinion of the practitioner. The medical note is intended as peer to peer communication and may appear blunt or direct. It is written in medical language and may contain abbreviations or verbiage that are unfamiliar.              **Certification      PHYSICIAN Certification of Need for  Inpatient Hospitalization - Initial Certification    Patient will require inpatient services that will reasonably be expected to span two midnight's based on the clinical documentation in H+P.   Based on patients current state of illness, I anticipate that, after discharge, patient will require TBD.

## 2024-12-06 VITALS
TEMPERATURE: 98 F | WEIGHT: 98 LBS | SYSTOLIC BLOOD PRESSURE: 144 MMHG | HEART RATE: 68 BPM | BODY MASS INDEX: 18.5 KG/M2 | DIASTOLIC BLOOD PRESSURE: 67 MMHG | OXYGEN SATURATION: 97 % | RESPIRATION RATE: 18 BRPM | HEIGHT: 61 IN

## 2024-12-06 LAB
ALBUMIN SERPL-MCNC: 3.6 G/DL (ref 3.2–4.8)
ALBUMIN/GLOB SERPL: 1.2 {RATIO} (ref 1–2)
ALP LIVER SERPL-CCNC: 52 U/L
ALT SERPL-CCNC: 32 U/L
ANION GAP SERPL CALC-SCNC: 5 MMOL/L (ref 0–18)
AST SERPL-CCNC: 37 U/L (ref ?–34)
BASOPHILS # BLD AUTO: 0.01 X10(3) UL (ref 0–0.2)
BASOPHILS NFR BLD AUTO: 0.2 %
BILIRUB SERPL-MCNC: 1.1 MG/DL (ref 0.2–1.1)
BUN BLD-MCNC: 8 MG/DL (ref 9–23)
CALCIUM BLD-MCNC: 8.6 MG/DL (ref 8.7–10.4)
CHLORIDE SERPL-SCNC: 113 MMOL/L (ref 98–112)
CO2 SERPL-SCNC: 20 MMOL/L (ref 21–32)
CREAT BLD-MCNC: 1.27 MG/DL
EGFRCR SERPLBLD CKD-EPI 2021: 45 ML/MIN/1.73M2 (ref 60–?)
EOSINOPHIL # BLD AUTO: 0.05 X10(3) UL (ref 0–0.7)
EOSINOPHIL NFR BLD AUTO: 0.8 %
ERYTHROCYTE [DISTWIDTH] IN BLOOD BY AUTOMATED COUNT: 13.7 %
GLOBULIN PLAS-MCNC: 2.9 G/DL (ref 2–3.5)
GLUCOSE BLD-MCNC: 110 MG/DL (ref 70–99)
HCT VFR BLD AUTO: 34.2 %
HGB BLD-MCNC: 11.5 G/DL
IMM GRANULOCYTES # BLD AUTO: 0.04 X10(3) UL (ref 0–1)
IMM GRANULOCYTES NFR BLD: 0.6 %
LYMPHOCYTES # BLD AUTO: 0.82 X10(3) UL (ref 1–4)
LYMPHOCYTES NFR BLD AUTO: 12.6 %
MCH RBC QN AUTO: 32.8 PG (ref 26–34)
MCHC RBC AUTO-ENTMCNC: 33.6 G/DL (ref 31–37)
MCV RBC AUTO: 97.4 FL
MONOCYTES # BLD AUTO: 0.45 X10(3) UL (ref 0.1–1)
MONOCYTES NFR BLD AUTO: 6.9 %
NEUTROPHILS # BLD AUTO: 5.14 X10 (3) UL (ref 1.5–7.7)
NEUTROPHILS # BLD AUTO: 5.14 X10(3) UL (ref 1.5–7.7)
NEUTROPHILS NFR BLD AUTO: 78.9 %
OSMOLALITY SERPL CALC.SUM OF ELEC: 285 MOSM/KG (ref 275–295)
PHOSPHATE SERPL-MCNC: 1.9 MG/DL (ref 2.4–5.1)
PLATELET # BLD AUTO: 169 10(3)UL (ref 150–450)
POTASSIUM SERPL-SCNC: 5.2 MMOL/L (ref 3.5–5.1)
POTASSIUM SERPL-SCNC: 5.4 MMOL/L (ref 3.5–5.1)
PROT SERPL-MCNC: 6.5 G/DL (ref 5.7–8.2)
RBC # BLD AUTO: 3.51 X10(6)UL
SODIUM SERPL-SCNC: 138 MMOL/L (ref 136–145)
WBC # BLD AUTO: 6.5 X10(3) UL (ref 4–11)

## 2024-12-06 PROCEDURE — 99239 HOSP IP/OBS DSCHRG MGMT >30: CPT | Performed by: STUDENT IN AN ORGANIZED HEALTH CARE EDUCATION/TRAINING PROGRAM

## 2024-12-06 PROCEDURE — 99232 SBSQ HOSP IP/OBS MODERATE 35: CPT | Performed by: STUDENT IN AN ORGANIZED HEALTH CARE EDUCATION/TRAINING PROGRAM

## 2024-12-06 RX ORDER — DOCUSATE SODIUM 100 MG/1
100 CAPSULE, LIQUID FILLED ORAL 2 TIMES DAILY PRN
Qty: 30 CAPSULE | Refills: 0 | Status: SHIPPED | OUTPATIENT
Start: 2024-12-06 | End: 2024-12-11

## 2024-12-06 RX ORDER — POTASSIUM CHLORIDE 14.9 MG/ML
20 INJECTION INTRAVENOUS ONCE
Status: COMPLETED | OUTPATIENT
Start: 2024-12-06 | End: 2024-12-06

## 2024-12-06 NOTE — PROGRESS NOTES
Received pt A&Ox4- Mandarin speaking.  on RA. NSR on tele. Lovenox for VTE prophylaxis. L Colostomy. R nephrostomy. NGT on low intermittent suction.  IVF @ 85. New IV. Voids via BRP. Up Standby. Potassium replaced. Denies Pain. Plan of care continues, no further needs at this time.

## 2024-12-06 NOTE — PROGRESS NOTES
NURSING DISCHARGE NOTE    Discharged Home via Wheelchair.  Accompanied by Family member and Support staff  Belongings Taken by patient/family.      Discharge instructions explained to patient and daughter at bedside. IV removed. Patient's belongings taken by patient and family. No further questions at this time.

## 2024-12-06 NOTE — PROGRESS NOTES
[facility]  General Surgery Progress Note    Anmol Gonzalez Patient Status:  Inpatient    2/15/1950 MRN [id number]   Location [facility] 5NW-A Attending Una Romano MD   Hosp Day # 5 PCP Jess Esquivel MD     Subjective:  Tolerated clamp trial yesterday as well as sips of clears.  Having more ostomy output.  Denies any nausea vomiting.    Objective/Physical Exam:      Intake/Output Summary (Last 24 hours) at 2024 1548  Last data filed at 2024 0913  Gross per 24 hour   Intake --   Output 1400 ml   Net -1400 ml       Vital Signs:  Blood pressure 144/67, pulse 68, temperature 97.8 °F (36.6 °C), temperature source Oral, resp. rate 18, height 61\", weight 98 lb (44.5 kg), SpO2 97%, not currently breastfeeding.    General: Alert, orientated x3.  Cooperative.  No apparent distress.  HEENT: Exam is unremarkable.  Without scleral icterus.  Mucous membranes are moist. Oropharynx is clear.  Neck: No JVD. Supple.   Lungs: Non labored breathing, equal chest rise  Cardiac: Regular rate and rhythm. No murmur.  Abdomen:  Soft, non-distended, non-tender, with no rebound or guarding.  No peritoneal signs.  Left-sided ostomy with softer stool, stoma pink and patent  Extremities:  No lower extremity edema noted.  Without clubbing or cyanosis.  2+ pulses x4, motor and sensation grossly intact      Labs:  Lab Results   Component Value Date    WBC 6.5 2024    RBC 3.51 2024    HGB 11.5 2024    HCT 34.2 2024    MCV 97.4 2024    MCH 32.8 2024    MCHC 33.6 2024    RDW 13.7 2024    .0 2024     Lab Results   Component Value Date     2024    K 5.2 2024     2024    CO2 20.0 2024    BUN 8 2024    CREATSERUM 1.27 2024     2024    CA 8.6 2024    ALKPHO 52 2024    ALT 32 2024    AST 37 2024    BILT 1.1 2024    ALB 3.6 2024    TP 6.5 2024     Lab Results   Component Value  Date    PHOS 1.9 12/06/2024       Images:  No results found.    Assessment/Plan:  Patient Active Problem List   Diagnosis    Hydronephrosis, right    Colostomy in place (HCC)    Stage 3a chronic kidney disease (HCC)    Elevated lipoprotein A level    Hyperlipidemia    Elevated blood pressure reading    Insomnia    Frequent diarrhea    Heart murmur    SBO (small bowel obstruction) (HCC)       74 year old female with small bowel obstruction and constipation    No acute surgical intervention  Will do clamp trial today  If patient has less than 200 cc in residuals, can remove NG tube and start clear liquids  Recommend another dose of milk of magnesia to help with constipation  Can advance diet as tolerated if patient tolerates clamp trial  If she continues to tolerate diet, she will be cleared from a surgical standpoint for discharge  Patient should have some bowel regimen to prevent constipation, either milk of mag or Colace  Surgery will continue to follow while in house  Rest of care per primary    Najma Sagastume MD  [facility] [facility]  12/6/2024  3:48 PM

## 2024-12-06 NOTE — PROGRESS NOTES
[facility]   part of [facility]     Hospitalist Progress Note     Anmol Gonzalez Patient Status:  Inpatient    2/15/1950 MRN [id number]   Location [facility] 5NW-A Attending Edmundo Varela MD   Hosp Day # 5 PCP Jess Esquivel MD     Chief Complaint: nausea vomiting abd pain    Subjective:     Patient has NGT clamped    Objective:    Review of Systems:   A comprehensive review of systems was NOT completed; pertinent positive and negatives stated in subjective.    Vital signs:  Temp:  [98.3 °F (36.8 °C)-99.1 °F (37.3 °C)] 98.5 °F (36.9 °C)  Pulse:  [60-68] 66  Resp:  [16-18] 17  BP: (141-183)/(52-82) 165/67  SpO2:  [97 %-98 %] 97 %    Physical Exam:    General: No acute distress  Respiratory: No wheezes, no rhonchi  Cardiovascular: S1, S2, regular rate and rhythm  Abdomen: Soft, Non-tender, non-distended, positive bowel sounds  Neuro: No new focal deficits.   Extremities: No edema      Diagnostic Data:    Labs:  Recent Labs   Lab 24  1829 24  0549 24  0452   WBC 8.9 5.0 5.5   HGB 14.3 12.0 10.9*   MCV 90.4 91.6 95.5   .0 181.0 145.0*       Recent Labs   Lab 24  1829 24  0549 24  1747 24  1527 24  0452 24  2254 24  0740   * 108*  --  138* 119*  --  110*   BUN 17 18  --  14 13  --  8*   CREATSERUM 1.62* 1.50*  --  1.24* 1.28*  --  1.27*   CA 11.0* 9.6  --  9.0 9.0  --  8.6*   ALB 5.3* 4.2  --   --   --   --  3.6   * 143  --  150* 149*  --  138   K 3.9 3.0*   < > 2.4* 3.0*  3.0* 3.0* 5.4*   CL 95* 101  --  116* 115*  --  113*   CO2 26.0 30.0  --  27.0 25.0  --  20.0*   ALKPHO 63 50*  --   --   --   --  52*   AST 51* 35*  --   --   --   --  37*   * 68*  --   --   --   --  32   BILT 1.8* 1.5*  --   --   --   --  1.1   TP 8.2 6.7  --   --   --   --  6.5    < > = values in this interval not displayed.       Estimated Creatinine Clearance: 28.1 mL/min (A) (based on SCr of 1.27 mg/dL (H)).    No results for input(s): \"TROP\",  \"TROPHS\", \"CK\" in the last 168 hours.    No results for input(s): \"PTP\", \"INR\" in the last 168 hours.               Microbiology    No results found for this visit on 12/01/24.      Imaging: Reviewed in Epic.    Medications:    magnesium hydroxide  30 mL Oral Once    pantoprazole  40 mg Intravenous Q12H    sodium chloride  1,000 mL Intravenous Once    enoxaparin  30 mg Subcutaneous Daily       Assessment & Plan:      #SVT on 12/4  HypoK on labs, replete  Echo within normal     #Small bowel obstruction  #hyperbilirubinemia better  #lactic acidosis better  #elevated aminotransferases better       #CKD 3b  -Cr 1.62: baseline 1.36 with GFR 41  -less than 1.5 x elevation  -cont to trend BMP  -strict I/O, avoid nephrotoxins  -hold home bicarb tabs while NPO and monitor for acidosis     #HLD  -hold home statin while NPO     #insomnia-hold home trazodone while NPO     #iron deficiency  -hold home iron supplement while NPO     #chronic pain  -hold home tizanidine     #? 2nd degree AVB 12/4 - resolved  EKG, BMP Mg showing hypoK, resolved     #hyperNa  S/p D5W- Na wihtin normal       Una Romano MD    Supplementary Documentation:     Quality:  DVT Mechanical Prophylaxis:   SCDs,    DVT Pharmacologic Prophylaxis   Medication    enoxaparin (Lovenox) 30 MG/0.3ML SUBQ injection 30 mg                Code Status: Not on file  Pratt: No urinary catheter in place  Pratt Duration (in days):   Central line:    SHELLEY: 12/6/2024    Discharge is dependent on: progress  At this point Ms. Gonzalez is expected to be discharge to: home    The 21st Century Cures Act makes medical notes like these available to patients in the interest of transparency. Please be advised this is a medical document. Medical documents are intended to carry relevant information, facts as evident, and the clinical opinion of the practitioner. The medical note is intended as peer to peer communication and may appear blunt or direct. It is written in medical language and  may contain abbreviations or verbiage that are unfamiliar.              **Certification      PHYSICIAN Certification of Need for Inpatient Hospitalization - Initial Certification    Patient will require inpatient services that will reasonably be expected to span two midnight's based on the clinical documentation in H+P.   Based on patients current state of illness, I anticipate that, after discharge, patient will require TBD.

## 2024-12-06 NOTE — DIETARY NOTE
[facility]   part of [facility]   CLINICAL NUTRITION    Anmol Gonzalez admitted on 12/1 presents with SBO.    PMH: cervical ca (s/p radiation, ileostomy), HLD     Admitting diagnosis:  SBO (small bowel obstruction) (HCC) [K56.609]    Ht: 154.9 cm (5' 1\")  Wt: 44.5 kg (98 lb).   Body mass index is 18.52 kg/m².    Wt Readings from Last 6 Encounters:   12/02/24 44.5 kg (98 lb)   11/25/24 46.5 kg (102 lb 9.6 oz)   11/12/24 46.3 kg (102 lb)   11/04/24 46.5 kg (102 lb 8 oz)   10/07/24 44.8 kg (98 lb 12.8 oz)   07/31/24 45.4 kg (100 lb)        Labs/Meds reviewed    Diet:       Procedures    Full liquid diet Is Patient on Accuchecks? No       Percent Meals Eaten (last 3 days)       None            Pt chart reviewed d/t LOS. Per chart review, pt had NGT placed for SBO which was removed today. Diet currently advanced to full liquids with possible plan to discharge today. Per RN, tolerating liquids with nausea, diarrhea, emesis, or constipation; +ileostomy in place. Wt stable since October. No chewing or swallowing difficulties and NKFA. Will continue to monitor and follow up as appropriate if pt remains admitted.    Patient is at low nutrition risk at this time.    Please consult if patient status changes or nutrition issues arise.    Sasha Clemons, RD, LDN, CNSC  Clinical Dietitian  Spectra: [phone]

## 2024-12-06 NOTE — PLAN OF CARE
Problem: Patient/Family Goals  Goal: Patient/Family Long Term Goal  Description: Patient's Long Term Goal:   Get ng tube removed    Interventions:  - General surgery  - IVF  - See additional Care Plan goals for specific interventions  Outcome: Progressing  Goal: Patient/Family Short Term Goal  Description: Patient's Short Term Goal:   12/1 noc: NG suction  12/2noc: NG suction, sleep  12/3 noc: reduce NG output  12/4noc: sleep, decrease NG o.p  12/5 AM: remain comfortable  12/5 NOC: Sleep    Interventions:   - Cluster care  - low intermittent  - IVF  - See additional Care Plan goals for specific interventions  Outcome: Progressing     Problem: RESPIRATORY - ADULT  Goal: Achieves optimal ventilation and oxygenation  Description: INTERVENTIONS:  - Assess for changes in respiratory status  - Assess for changes in mentation and behavior  - Position to facilitate oxygenation and minimize respiratory effort  - Oxygen supplementation based on oxygen saturation or ABGs  - Provide Smoking Cessation handout, if applicable  - Encourage broncho-pulmonary hygiene including cough, deep breathe, Incentive Spirometry  - Assess the need for suctioning and perform as needed  - Assess and instruct to report SOB or any respiratory difficulty  - Respiratory Therapy support as indicated  - Manage/alleviate anxiety  - Monitor for signs/symptoms of CO2 retention  Outcome: Progressing     Problem: GASTROINTESTINAL - ADULT  Goal: Minimal or absence of nausea and vomiting  Description: INTERVENTIONS:  - Maintain adequate hydration with IV or PO as ordered and tolerated  - Nasogastric tube to low intermittent suction as ordered  - Evaluate effectiveness of ordered antiemetic medications  - Provide nonpharmacologic comfort measures as appropriate  - Advance diet as tolerated, if ordered  - Obtain nutritional consult as needed  - Evaluate fluid balance  Outcome: Progressing  Goal: Maintains or returns to baseline bowel function  Description:  INTERVENTIONS:  - Assess bowel function  - Maintain adequate hydration with IV or PO as ordered and tolerated  - Evaluate effectiveness of GI medications  - Encourage mobilization and activity  - Obtain nutritional consult as needed  - Establish a toileting routine/schedule  - Consider collaborating with pharmacy to review patient's medication profile  Outcome: Progressing  Goal: Maintains adequate nutritional intake (undernourished)  Description: INTERVENTIONS:  - Monitor percentage of each meal consumed  - Identify factors contributing to decreased intake, treat as appropriate  - Assist with meals as needed  - Monitor I&O, WT and lab values  - Obtain nutritional consult as needed  - Optimize oral hygiene and moisture  - Encourage food from home; allow for food preferences  - Enhance eating environment  Outcome: Progressing  Goal: Achieves appropriate nutritional intake (bariatric)  Description: INTERVENTIONS:  - Monitor for over-consumption  - Identify factors contributing to increased intake, treat as appropriate  - Monitor I&O, WT and lab values  - Obtain nutritional consult as needed  - Evaluate psychosocial factors contributing to over-consumption  Outcome: Progressing

## 2024-12-08 NOTE — DISCHARGE SUMMARY
[facility]     Anmol Gonzalez Patient Status:  Inpatient    2/15/1950 MRN [id number]   Location [facility] 5NW-A Attending No att. providers found   Hosp Day # 5 PCP Jess Esquivel MD     Date of Admission: 2024  Date of Discharge:  2024     Discharge Disposition: Home or Self Care    Discharge Diagnosis:  #Small bowel obstruction  #hyperbilirubinemia better  #lactic acidosis better  #elevated aminotransferases better        #CKD 3b  -Cr 1.62: baseline 1.36 with GFR 41  -less than 1.5 x elevation  -cont to trend BMP  -strict I/O, avoid nephrotoxins  -hold home bicarb tabs while NPO and monitor for acidosis     #HLD  -hold home statin while NPO     #insomnia-hold home trazodone while NPO     #iron deficiency  -hold home iron supplement while NPO     #chronic pain  -hold home tizanidine     #? 2nd degree AVB  - resolved  EKG, BMP Mg showing hypoK, resolved      #hyperNa  S/p D5W- Na wihtin normal     History of Present Illness:   Anmol Gonzalez is a 74 year old female with PMHx cervical ca (s/p radiation, colostomy, ileostomy)/ HLD/ who presented to the hospital for nausea and emesis. Mandarin  was utilized to help obtain history. She reports beginning to have problems with nausea and emesis starting this morning. She was not able to drink or eat without vomiting shortly thereafter. She had some abdominal discomfort only prior to vomiting which then resolved after emesis. She notes her colostomy last had outpt in the early morning and then nothing since then. She notes chills but denied any fever. She reports one obstruction in the past after her colostomy surgery.     Brief Synopsis:   PT admitted surgery consulted for SBO , h/o colostomy - presented with decreased output. Pt with NGT and improved bowel function. DC home w stool softener.  Pt did develop SVT- deemed due to hypoK, and hyperNa corrected with D5.  Echo was done no acute findings. DC home.    Lace+ Score:  43  59-90 High Risk  29-58 Medium Risk  0-28   Low Risk       TCM Follow-Up Recommendation:  LACE > 58: High Risk of readmission after discharge from the hospital.      Procedures during hospitalization:   no    Incidental or significant findings and recommendations (brief descriptions):  no    Lab/Test results pending at Discharge:   no    Consultants:  Surgery     Discharge Medication List:     Discharge Medications        START taking these medications        Instructions Prescription details   docusate sodium 100 MG Caps  Commonly known as: Colace      Take 1 capsule (100 mg total) by mouth 2 (two) times daily as needed for constipation.   Quantity: 30 capsule  Refills: 0            CONTINUE taking these medications        Instructions Prescription details   clindamycin 300 MG Caps  Commonly known as: Cleocin      Take 1 capsule (300 mg total) by mouth every 8 (eight) hours.   Refills: 0     Coenzyme Q10 100 MG Caps      Take 300 mg by mouth daily.   Refills: 0     Ferrous Sulfate 325 (65 Fe) MG Tabs      Take 1 tablet (325 mg total) by mouth daily with breakfast.   Quantity: 90 tablet  Refills: 0     omega-3 fatty acids 1000 MG Caps  Commonly known as: Fish Oil      Take 1,000 mg by mouth daily.   Refills: 0     polyethylene glycol (PEG 3350) 17 g Pack  Commonly known as: Miralax      Take 17 g by mouth daily.   Refills: 0     rosuvastatin 5 MG Tabs  Commonly known as: Crestor      Take 1 tablet (5 mg total) by mouth daily.   Refills: 0     SF 5000 Plus 1.1 % Crea  Generic drug: Sodium Fluoride      Apply a thin ribbon of paste to a toothbrush. Brush thoroughly once daily for 2 minutes, preferably at bedtime. Spit the paste out after use and do not eat, drink, or rinse for 30 minutes. Do not swallow the paste. Use regular toothpaste in the morning.   Refills: 0     sodium bicarbonate 650 MG Tabs      Take 1 tablet (650 mg total) by mouth 2 (two) times daily.   Refills: 0     tiZANidine 2 MG Tabs  Commonly known  as: Zanaflex      Take 1-2 tablets (2-4 mg total) by mouth 2 (two) times daily as needed.   Refills: 0     traZODone 50 MG Tabs  Commonly known as: Desyrel      Take 1 tablet (50 mg total) by mouth nightly.   Quantity: 90 tablet  Refills: 0     VITAMIN C OR      Take by mouth daily.   Refills: 0     Zolpidem Tartrate ER 6.25 MG Tbcr  Commonly known as: Ambien CR      Take 1 tablet (6.25 mg total) by mouth nightly as needed for Sleep.   Quantity: 90 tablet  Refills: 0            STOP taking these medications      loperamide 2 MG Caps  Commonly known as: Imodium                  Where to Get Your Medications        These medications were sent to [id number] - [place], [place] - [address] [phone], [phone]  [facility] [address]      Phone: [phone]   docusate sodium 100 MG Caps         [place] reviewed[place] n/a     Follow-up appointment:   Jess Esquivel MD  [address]  [address]  [facility] [address] [phone]    Follow up in 1 week(s)      Appointments for Next 30 Days 2024 - 2025        Date Arrival Time Visit Type Length Department Provider     2024 10:30 AM  EXAM - NEW PATIENT [2845] 30 min [facility] Landin MD    Patient Instructions:         Location Instructions:     Masks are optional for all patients and visitors, unless otherwise indicated.                      Vital signs:       Physical Exam:    General: No acute distress   Lungs: clear to auscultation  Cardiovascular: S1, S2  Abdomen: Soft    -----------------------------------------------------------------------------------------------  PATIENT DISCHARGE INSTRUCTIONS: See electronic chart    Una Romano MD    Total time spent on discharge plannin minutes     The  [employer] Cures Act makes medical notes like these available to patients in the interest of transparency. Please be advised this is a medical document. Medical documents are  intended to carry relevant information, facts as evident, and the clinical opinion of the practitioner. The medical note is intended as peer to peer communication and may appear blunt or direct. It is written in medical language and may contain abbreviations or verbiage that are unfamiliar.

## 2024-12-09 ENCOUNTER — PATIENT OUTREACH (OUTPATIENT)
Dept: CASE MANAGEMENT | Age: 74
End: 2024-12-09

## 2024-12-09 ENCOUNTER — TELEPHONE (OUTPATIENT)
Dept: INTERNAL MEDICINE CLINIC | Facility: CLINIC | Age: 74
End: 2024-12-09

## 2024-12-09 DIAGNOSIS — Z02.9 ENCOUNTERS FOR UNSPECIFIED ADMINISTRATIVE PURPOSE: Primary | ICD-10-CM

## 2024-12-09 NOTE — TELEPHONE ENCOUNTER
- please call and schedule for tcm hfu. Please and thank you!  Tcm placed.  Dr. Esquivel- please see note below from RN. Able to advise at all or to be discussed at appoinment? Thanks!

## 2024-12-09 NOTE — TELEPHONE ENCOUNTER
Daughter on david aware. Verbalizes understanding.    Future Appointments   Date Time Provider Department Center   12/11/2024 10:00 AM Soumya Gan APRN [facility] [place]   12/19/2024 10:30 AM Jordan Landin MD [id number]   1/21/2025  8:20 AM Jess Esquivel MD [facility] [place]   1/28/2025 10:30 AM Leonela Kay MD [facility] [place]   2/19/2025  8:00 AM Tarun Dubose MD [facility]

## 2024-12-09 NOTE — PROGRESS NOTES
Transitional Care Management   Discharge Date: 24  Contact Date: 2024    Assessment:  TCM Initial Assessment    General:  Assessment completed with: Family (Christianne- dtr per HIPAA)  Patient Subjective: S/w dtr, Christianne, she reports pt is doing well. Pt has loose stool at this time. Dtr denies blood in the stool. Pt denies abdominal pain/distention, fevers, chills, dizziness, weakness, confusion, chest pain, shortness of breath, and feeling faint. Pt is eating and drinking well. Pt has advanced her diet, denies n/v. Pt has the colostomy in place and denies issues with output besides loose stool. Pt is not taking taking the stool softeners at this time due to loose stool. Pt is urinating well. Dtr feels Imodium does help the loose stool but pt has not started it yet, TE to PCP for recommendations.  Chief Complaint: Small bowel obstruction   Hx Colostomy  Verify patient name and  with patient/ caregiver: Yes    Hospital Stay/Discharge:  Tell me what you understand of why you were in the hospital or emergency department: Nausea and vomiting  Prior to leaving the hospital were your Discharge Instructions reviewed with you?: Yes  Did you receive a copy of your written Discharge Instructions?: Yes  What questions do you have about your Discharge Instructions?: None at this time  Do you feel better or worse since you left the hospital or emergency department?: Better    Follow - Up Appointment:  Do you have a follow-up appointment?: Yes  Date: 24  Physician: Surgeon  Are there any barriers to getting to your follow-up appointment?: No    Home Health/DME:  Prior to leaving the hospital was Home Health (HH) arranged for you?: No     Prior to leaving the hospital or emergency department was Durable Medical Equipment (DME), medical supplies, or infusions arranged for you?: No  Are DME/medical supply/infusions needs identified by staff during this assessment?: No     Medications/Diet:  Did any of your medications  change, during or after your hospital stay or ED visit?: Yes  Do you have your new or updated medications?: Yes  Do you understand what your medications are for and possible side effects?: Yes  Are there any reasons that keep you from taking your medication as prescribed?: No  Any concerns about medication refills?: No    Were you given a different diet per your Discharge Instructions?: Yes  Diet Type: Advance as tolerated  Are there any barriers to following that diet?: No     Questions/Concerns:  Do you have any questions or concerns that have not been discussed?: No      Nursing Interventions: All d/c instructions reviewed with the pt's dtr. Reviewed when to call MD vs when to call 911 or go the ED. Discussed s/s of SBO and dehydration. Educated pt's dtr on the importance of taking all meds as prescribed as well as close f/u with PCP/specialists. Dtr verbalized understanding and will contact the office with any further questions or concerns.     Medication Review: Dtr declined to review med list at this time.   Current Outpatient Medications   Medication Sig Dispense Refill    docusate sodium 100 MG Oral Cap Take 1 capsule (100 mg total) by mouth 2 (two) times daily as needed for constipation. 30 capsule 0    polyethylene glycol, PEG 3350, 17 g Oral Powd Pack Take 17 g by mouth daily.      clindamycin 300 MG Oral Cap Take 1 capsule (300 mg total) by mouth every 8 (eight) hours.      rosuvastatin 5 MG Oral Tab Take 1 tablet (5 mg total) by mouth daily.      traZODone 50 MG Oral Tab Take 1 tablet (50 mg total) by mouth nightly. 90 tablet 0    Ferrous Sulfate 325 (65 Fe) MG Oral Tab Take 1 tablet (325 mg total) by mouth daily with breakfast. 90 tablet 0    Ascorbic Acid (VITAMIN C OR) Take by mouth daily.      Zolpidem Tartrate ER (AMBIEN CR) 6.25 MG Oral Tab CR Take 1 tablet (6.25 mg total) by mouth nightly as needed for Sleep. 90 tablet 0    Coenzyme Q10 100 MG Oral Cap Take 300 mg by mouth daily.      omega-3  fatty acids 1000 MG Oral Cap Take 1,000 mg by mouth daily.      sodium bicarbonate 650 MG Oral Tab Take 1 tablet (650 mg total) by mouth 2 (two) times daily.      SF 5000 PLUS 1.1 % Dental Cream Apply a thin ribbon of paste to a toothbrush. Brush thoroughly once daily for 2 minutes, preferably at bedtime. Spit the paste out after use and do not eat, drink, or rinse for 30 minutes. Do not swallow the paste. Use regular toothpaste in the morning.      tiZANidine 2 MG Oral Tab Take 1-2 tablets (2-4 mg total) by mouth 2 (two) times daily as needed.       Did patient review medications using current pill bottles and not just a medication list?  No  Discharge medications reviewed/discussed/and reconciled against outpatient medications with patient.  Any changes or updates to medications sent to primary care provider.  Patient Declined    SDOH:     Transportation Needs: No Transportation Needs (12/9/2024)    Transportation Needs     Lack of Transportation: No     Car Seat: Not on file     Financial Resource Strain: Low Risk  (12/9/2024)    Financial Resource Strain     Difficulty of Paying Living Expenses: Not very hard     Med Affordability: No       Follow-up Appointments: Reviewed recommended follow up appointments. Dtr denies any barriers to keeping/getting to U appts.     Your appointments       Date & Time Appointment Department ([place])    Dec 19, 2024 10:30 AM CST Exam - New Patient with Jordan Landin MD [facility] ([facility])        Jan 21, 2025 8:20 AM CST Exam - Established with Jess Esquivel MD [facility] ([facility])        Jan 28, 2025 10:30 AM CST Exam - Established with Leonela Kay MD [facility]ph[facility] ([facility])        Feb 19, 2025 8:00 AM CST Follow-Up OV with Tarun Dubose MD [facility],  [facility] ([facility])     Please arrive 15 minutes prior to your scheduled appointment time.               [facility]  [facility] [address]  [address]  [facility] [address] [phone] [facility], [address], [facility] [address]  [address]  [facility] [id number] [phone] [facility], [place],[facility] [place]  [address]  [place] [address] [phone]    [facility] Gastroenterology,  [facility]  [address] [address] [phone]            Trans[facility]  Was TCC Ordered: No    Primary Care Provider (If no TCC appointment)  Does patient already have a PCP appointment scheduled? No  Nurse Care Manager Attempted to schedule PCP office TCM appointment with patient   -If no appointment scheduled: Explain : Dtr declined, TE to PCP to FU.       Specialist  Does the patient have any other follow-up appointment(s) that need to be scheduled? Yes   -If yes: Nurse Care Manager reviewed upcoming specialist appointments with patient: Yes   -Does the patient need assistance scheduling appointment(s): No- pt has an appt with Surgeon's office on 12/19/24.     CCM referral placed:  No    Book By Date: 12/20/24

## 2024-12-09 NOTE — PAYOR COMM NOTE
--------------  DISCHARGE REVIEW    Payor: [employer]  Subscriber #:  [id number]  Authorization Number: [id number]    Admit date: 24  Admit time:  11:13 PM  Discharge Date: 2024  5:13 PM     Admitting Physician: Edmundo Varela MD  Attending Physician:  No att. providers found  Primary Care Physician: Jess Esquivel MD          Discharge Summary Notes        Discharge Summary signed by Una Romano MD at 2024  1:05 PM       Author: Una Romano MD Specialty: HOSPITALIST, Internal Medicine Author Type: Physician    Filed: 2024  1:05 PM Date of Service: 2024  1:02 PM Status: Signed    : Una Romano MD (Physician)           [facility]  DISCHARGE SUMMARY     Anmol Gonzalez Patient Status:  Inpatient    2/15/1950 MRN [id number]   Location [facility] 5NW-A Attending No att. providers found   Hosp Day # 5 PCP Jess Esquivel MD     Date of Admission: 2024  Date of Discharge:  2024     Discharge Disposition: Home or Self Care    Discharge Diagnosis:  #Small bowel obstruction  #hyperbilirubinemia better  #lactic acidosis better  #elevated aminotransferases better        #CKD 3b  -Cr 1.62: baseline 1.36 with GFR 41  -less than 1.5 x elevation  -cont to trend BMP  -strict I/O, avoid nephrotoxins  -hold home bicarb tabs while NPO and monitor for acidosis     #HLD  -hold home statin while NPO     #insomnia-hold home trazodone while NPO     #iron deficiency  -hold home iron supplement while NPO     #chronic pain  -hold home tizanidine     #? 2nd degree AVB  - resolved  EKG, BMP Mg showing hypoK, resolved      #hyperNa  S/p D5W- Na wihtin normal     History of Present Illness:   Anmol Gonzalez is a 74 year old female with PMHx cervical ca (s/p radiation, colostomy, ileostomy)/ HLD/ who presented to the hospital for nausea and emesis. Mandarin  was utilized to help obtain history. She reports beginning to have problems with nausea and emesis starting this  morning. She was not able to drink or eat without vomiting shortly thereafter. She had some abdominal discomfort only prior to vomiting which then resolved after emesis. She notes her colostomy last had outpt in the early morning and then nothing since then. She notes chills but denied any fever. She reports one obstruction in the past after her colostomy surgery.     Brief Synopsis:   PT admitted surgery consulted for SBO , h/o colostomy - presented with decreased output. Pt with NGT and improved bowel function. DC home w stool softener.  Pt did develop SVT- deemed due to hypoK, and hyperNa corrected with D5.  Echo was done no acute findings. DC home.    Lace+ Score: 43  59-90 High Risk  29-58 Medium Risk  0-28   Low Risk       TCM Follow-Up Recommendation:  LACE > 58: High Risk of readmission after discharge from the hospital.      Procedures during hospitalization:   no    Incidental or significant findings and recommendations (brief descriptions):  no    Lab/Test results pending at Discharge:   no    Consultants:  Surgery     Discharge Medication List:     Discharge Medications        START taking these medications        Instructions Prescription details   docusate sodium 100 MG Caps  Commonly known as: Colace      Take 1 capsule (100 mg total) by mouth 2 (two) times daily as needed for constipation.   Quantity: 30 capsule  Refills: 0            CONTINUE taking these medications        Instructions Prescription details   clindamycin 300 MG Caps  Commonly known as: Cleocin      Take 1 capsule (300 mg total) by mouth every 8 (eight) hours.   Refills: 0     Coenzyme Q10 100 MG Caps      Take 300 mg by mouth daily.   Refills: 0     Ferrous Sulfate 325 (65 Fe) MG Tabs      Take 1 tablet (325 mg total) by mouth daily with breakfast.   Quantity: 90 tablet  Refills: 0     omega-3 fatty acids 1000 MG Caps  Commonly known as: Fish Oil      Take 1,000 mg by mouth daily.   Refills: 0     polyethylene glycol (PEG 3350) 17 g  Pack  Commonly known as: Miralax      Take 17 g by mouth daily.   Refills: 0     rosuvastatin 5 MG Tabs  Commonly known as: Crestor      Take 1 tablet (5 mg total) by mouth daily.   Refills: 0     SF 5000 Plus 1.1 % Crea  Generic drug: Sodium Fluoride      Apply a thin ribbon of paste to a toothbrush. Brush thoroughly once daily for 2 minutes, preferably at bedtime. Spit the paste out after use and do not eat, drink, or rinse for 30 minutes. Do not swallow the paste. Use regular toothpaste in the morning.   Refills: 0     sodium bicarbonate 650 MG Tabs      Take 1 tablet (650 mg total) by mouth 2 (two) times daily.   Refills: 0     tiZANidine 2 MG Tabs  Commonly known as: Zanaflex      Take 1-2 tablets (2-4 mg total) by mouth 2 (two) times daily as needed.   Refills: 0     traZODone 50 MG Tabs  Commonly known as: Desyrel      Take 1 tablet (50 mg total) by mouth nightly.   Quantity: 90 tablet  Refills: 0     VITAMIN C OR      Take by mouth daily.   Refills: 0     Zolpidem Tartrate ER 6.25 MG Tbcr  Commonly known as: Ambien CR      Take 1 tablet (6.25 mg total) by mouth nightly as needed for Sleep.   Quantity: 90 tablet  Refills: 0            STOP taking these medications      loperamide 2 MG Caps  Commonly known as: Imodium                  Where to Get Your Medications        These medications were sent to [id number] - [place], [place] - [address] [phone], [phone]  [address] [address]      Phone: [phone]   docusate sodium 100 MG Caps         [facility] reviewed: n/a     Follow-up appointment:   Jess Esquivel MD  [address]  [address]  [facility] [address] [phone]    Follow up in 1 week(s)      Appointments for Next 30 Days 12/8/2024 - 1/7/2025        Date Arrival Time Visit Type Length Department Provider     12/19/2024 10:30 AM  EXAM - NEW PATIENT [2845] 30 min [facility]    Patient Instructions:          Location Instructions:     Masks are optional for all patients and visitors, unless otherwise indicated.                      Vital signs:       Physical Exam:    General: No acute distress   Lungs: clear to auscultation  Cardiovascular: S1, S2  Abdomen: Soft    -----------------------------------------------------------------------------------------------  PATIENT DISCHARGE INSTRUCTIONS: See electronic chart    Una Romano MD    Total time spent on discharge plannin minutes     The  [employer] Cures Act makes medical notes like these available to patients in the interest of transparency. Please be advised this is a medical document. Medical documents are intended to carry relevant information, facts as evident, and the clinical opinion of the practitioner. The medical note is intended as peer to peer communication and may appear blunt or direct. It is written in medical language and may contain abbreviations or verbiage that are unfamiliar.       Electronically signed by Una Romano MD on 2024  1:05 PM         REVIEWER COMMENTS

## 2024-12-09 NOTE — TELEPHONE ENCOUNTER
Patient's hospital follow up is scheduled for 12/11/24 with Soumya.  Patient's daughter wonders why she needs this follow up when she has follow up appts with specialists.

## 2024-12-09 NOTE — TELEPHONE ENCOUNTER
Future Appointments   Date Time Provider Department Center   12/19/2024 10:30 AM Jordan Landin MD [id number]   1/21/2025  8:20 AM Jess Esquivel MD [facility] [address] [facility] [address] [place]   1/28/2025 10:30 AM Leonela Kay MD [facility] [place]   2/19/2025  8:00 AM Tarun Dubose MD [facility]     Patient's daughter notified. verbalized understanding

## 2024-12-09 NOTE — TELEPHONE ENCOUNTER
Contact pt for TCM, s/w dtr Christianne, per HIPAA. Dtr stated pt is overall doing well however she has noticed pt has loose stool per the colostomy. Dtr denies blood is present in colostomy however the stool has yet to form or become soft. Pt denies abdominal pain/distension, fevers, chills, n/v, dizziness, weakness, confusion, feeling faint or feeling dehydrated. Pt is able to eat and drink well, has advanced her diet. Dtr would like to know what is recommended for the loose stool. Pt usually takes Imodium and that does help however pt has not started it yet. Please advise.     Patient does not have an appointment scheduled at this time.  Offered an appt with PCP however dtr declined as pt has an appt with Cardiology on 12/13/24 and Surgery on 12/19.24. TCM appointment recommended by 12/20/24 as patient is a Moderate risk for readmission.  Please advise.    BOOK BY DATE (last date for TCM): 12/20/24    Clinical staff:  Please follow-up with patient's and try to get them to schedule as patient would greatly benefit from TCM appointment. Also, please FU on recommendation. Thank you!

## 2024-12-09 NOTE — TELEPHONE ENCOUNTER
Hfu is needed with PCP as well as her specialists so as to review records and assess recovery progress. Any home health and other ancillary services need to be signed by pcp and a hfu is needed in order to continue these services. The idea is to identify and prevent potential complications and therefore avoid readmissions to the hospital by identifying issues early on. All these help to ensure a smooth transition back to her regular healthcare routine.

## 2024-12-11 ENCOUNTER — OFFICE VISIT (OUTPATIENT)
Dept: INTERNAL MEDICINE CLINIC | Facility: CLINIC | Age: 74
End: 2024-12-11
Payer: MEDICARE

## 2024-12-11 VITALS
WEIGHT: 98.38 LBS | RESPIRATION RATE: 16 BRPM | SYSTOLIC BLOOD PRESSURE: 110 MMHG | OXYGEN SATURATION: 98 % | DIASTOLIC BLOOD PRESSURE: 56 MMHG | HEART RATE: 71 BPM | HEIGHT: 61 IN | TEMPERATURE: 98 F | BODY MASS INDEX: 18.57 KG/M2

## 2024-12-11 DIAGNOSIS — E87.5 HYPERKALEMIA: ICD-10-CM

## 2024-12-11 DIAGNOSIS — Z93.3 COLOSTOMY IN PLACE (HCC): ICD-10-CM

## 2024-12-11 DIAGNOSIS — N18.31 STAGE 3A CHRONIC KIDNEY DISEASE (HCC): ICD-10-CM

## 2024-12-11 DIAGNOSIS — N13.30 HYDRONEPHROSIS, RIGHT: ICD-10-CM

## 2024-12-11 DIAGNOSIS — G47.00 INSOMNIA, UNSPECIFIED TYPE: ICD-10-CM

## 2024-12-11 DIAGNOSIS — Z09 HOSPITAL DISCHARGE FOLLOW-UP: Primary | ICD-10-CM

## 2024-12-11 DIAGNOSIS — K56.609 SBO (SMALL BOWEL OBSTRUCTION) (HCC): ICD-10-CM

## 2024-12-11 DIAGNOSIS — E78.5 HYPERLIPIDEMIA, UNSPECIFIED HYPERLIPIDEMIA TYPE: ICD-10-CM

## 2024-12-11 NOTE — PROGRESS NOTES
Subjective:   Anmol Gonzalez is a 74 year old female who presents for hospital follow up.   She was discharged from [facility] to Home or Self Care  Admission Date: 12/1/24   Discharge Date: 12/6/24  Hospital Discharge Diagnosis: Small bowel obstruction    Interactive contact within 2 business days post discharge first initiated on Date: 12/9/2024    I accessed [employer] and/or Care Everywhere and personally reviewed the following for the recent hospitalization: provider notes, consults, summaries, labs and other test results and the pertinent findings are documented below.     HPI per hospital discharge: Anmol Gonzalez is a 74 year old female with PMHx cervical ca (s/p radiation, colostomy, ileostomy)/ HLD/ who presented to the hospital for nausea and emesis. Mandarin  was utilized to help obtain history. She reports beginning to have problems with nausea and emesis starting this morning. She was not able to drink or eat without vomiting shortly thereafter. She had some abdominal discomfort only prior to vomiting which then resolved after emesis. She notes her colostomy last had outpt in the early morning and then nothing since then. She notes chills but denied any fever. She reports one obstruction in the past after her colostomy surgery.      Brief Synopsis:   PT admitted surgery consulted for SBO , h/o colostomy - presented with decreased output. Pt with NGT and improved bowel function. DC home w stool softener.  Pt did develop SVT- deemed due to hypoK, and hyperNa corrected with D5.  Echo was done no acute findings. DC home.    HPI: Information obtained from daughter who is interpreting. Patient declines interpretor services.    Reports she is doing well since discharge. Her stools are back to normal loose consistency. No fevers, chills, abdominal pain, nausea, or vomiting. Appetite is back to normal. She is seeing surgeon next week and GI in February. Denies any chest pain, shortness of breath, or  palpitations.    Her BP was slightly low, recheck improved. Reports her BP is usually around 140/70s at home. Has mild swelling to BLE towards the end of day, none right now. No calf pain or redness. She did have venous doppler ordered by cardiology done, no results available in care everywhere. She is seeing cardiology tomorrow and will follow up with them regarding that.     History/Other:   Current Medications:  Medication Reconciliation:  I am aware of an inpatient discharge within the last 30 days.  The discharge medication list has been reconciled with the patient's current medication list and reviewed by me. See medication list for additions of new medication, and changes to current doses of medications and discontinued medications.  Outpatient Medications Marked as Taking for the 12/11/24 encounter (Office Visit) with Soumya Gan APRN   Medication Sig    rosuvastatin 5 MG Oral Tab Take 1 tablet (5 mg total) by mouth daily.    traZODone 50 MG Oral Tab Take 1 tablet (50 mg total) by mouth nightly.    Ferrous Sulfate 325 (65 Fe) MG Oral Tab Take 1 tablet (325 mg total) by mouth daily with breakfast.    Ascorbic Acid (VITAMIN C OR) Take by mouth daily.    Coenzyme Q10 100 MG Oral Cap Take 300 mg by mouth daily.    omega-3 fatty acids 1000 MG Oral Cap Take 1,000 mg by mouth daily.    sodium bicarbonate 650 MG Oral Tab Take 1 tablet (650 mg total) by mouth 2 (two) times daily.    SF 5000 PLUS 1.1 % Dental Cream Apply a thin ribbon of paste to a toothbrush. Brush thoroughly once daily for 2 minutes, preferably at bedtime. Spit the paste out after use and do not eat, drink, or rinse for 30 minutes. Do not swallow the paste. Use regular toothpaste in the morning.       Review of Systems:  GENERAL: weight stable, energy stable, no sweating  SKIN: denies any unusual skin lesions  EYES: denies blurred vision or double vision  HEENT: denies nasal congestion, sinus pain or ST  LUNGS: denies shortness of breath with  exertion  CARDIOVASCULAR: denies chest pain on exertion or palpitations  GI: denies abdominal pain, denies heartburn, denies diarrhea  MUSCULOSKELETAL: denies pain, normal range of motion of extremities  NEURO: denies headaches, denies dizziness, denies weakness  PSYCHE: denies depression or anxiety  HEMATOLOGIC: hx of anemia+, denies bruising, denies bleeding  ENDOCRINE: denies thyroid history  ALL/ASTHMA: denies hx of allergy or asthma    Objective:   No LMP recorded. (Menstrual status: Supracervical Hysterectomy).  Estimated body mass index is 18.59 kg/m² as calculated from the following:    Height as of this encounter: 5' 1\" (1.549 m).    Weight as of this encounter: 98 lb 6.4 oz (44.6 kg).   /56   Pulse 71   Temp 98 °F (36.7 °C) (Temporal)   Resp 16   Ht 5' 1\" (1.549 m)   Wt 98 lb 6.4 oz (44.6 kg)   SpO2 98%   BMI 18.59 kg/m²    GENERAL: well developed, well nourished, in no apparent distress  SKIN: no rashes, no suspicious lesions  HEENT: atraumatic, normocephalic  EYES: PERRLA, EOMI, conjunctiva are clear  NECK: supple, no adenopathy, no bruits  CHEST: no chest tenderness  LUNGS: clear to auscultation  CARDIO: RRR without murmur  GI: good BS's, no masses, HSM. Has left colostomy bag in place with loose stool, stoma c/d/i.   : R Nephrostomy tube in place, c/d/i   MUSCULOSKELETAL: back is not tender, FROM of the extremities  EXTREMITIES: no cyanosis, clubbing or edema  NEURO: Oriented times three, cranial nerves are intact, motor and sensory are grossly intact    Assessment & Plan:   1. Hospital discharge follow-up (Primary)  2. SBO (small bowel obstruction) ([facility])  3. Colostomy in place ([facility])  -Tolerating po intake well. Continue to monitor  -See GI and surgeon as scheduled     4. Hyperkalemia  -Potassium was mildly elevated after repletion. Repeat to monitor  -     Potassium; Future; Expected date: 12/11/2024    5. Stage 3a chronic kidney disease (HCC)  6. Hydronephrosis, right  -Continue to see  nephrology    7. Hyperlipidemia, unspecified hyperlipidemia type  -On statin  -Sees cardiology    8. Insomnia, unspecified type  -Continue trazodone      Return for as scheduled or sooner as needed.

## 2024-12-19 ENCOUNTER — OFFICE VISIT (OUTPATIENT)
Facility: LOCATION | Age: 74
End: 2024-12-19
Payer: MEDICARE

## 2024-12-19 VITALS
RESPIRATION RATE: 18 BRPM | TEMPERATURE: 98 F | HEART RATE: 66 BPM | DIASTOLIC BLOOD PRESSURE: 59 MMHG | SYSTOLIC BLOOD PRESSURE: 117 MMHG | OXYGEN SATURATION: 99 %

## 2024-12-19 DIAGNOSIS — K56.609 SBO (SMALL BOWEL OBSTRUCTION) (HCC): ICD-10-CM

## 2024-12-19 DIAGNOSIS — L30.9 STOMA DERMATITIS: Primary | ICD-10-CM

## 2024-12-19 DIAGNOSIS — Z93.3 COLOSTOMY IN PLACE (HCC): ICD-10-CM

## 2024-12-19 DIAGNOSIS — R19.7 FREQUENT DIARRHEA: ICD-10-CM

## 2024-12-19 PROBLEM — D69.6 THROMBOCYTOPENIA: Chronic | Status: ACTIVE | Noted: 2024-12-19

## 2024-12-19 PROBLEM — D69.6 THROMBOCYTOPENIA (HCC): Chronic | Status: ACTIVE | Noted: 2024-12-19

## 2024-12-19 PROCEDURE — 1159F MED LIST DOCD IN RCRD: CPT | Performed by: STUDENT IN AN ORGANIZED HEALTH CARE EDUCATION/TRAINING PROGRAM

## 2024-12-19 PROCEDURE — 99203 OFFICE O/P NEW LOW 30 MIN: CPT | Performed by: STUDENT IN AN ORGANIZED HEALTH CARE EDUCATION/TRAINING PROGRAM

## 2024-12-19 PROCEDURE — 1160F RVW MEDS BY RX/DR IN RCRD: CPT | Performed by: STUDENT IN AN ORGANIZED HEALTH CARE EDUCATION/TRAINING PROGRAM

## 2024-12-19 PROCEDURE — 1111F DSCHRG MED/CURRENT MED MERGE: CPT | Performed by: STUDENT IN AN ORGANIZED HEALTH CARE EDUCATION/TRAINING PROGRAM

## 2024-12-20 ENCOUNTER — MED REC SCAN ONLY (OUTPATIENT)
Dept: INTERNAL MEDICINE CLINIC | Facility: CLINIC | Age: 74
End: 2024-12-20

## 2024-12-20 NOTE — H&P
New Patient Visit Note       Active Problems      1. Stoma dermatitis    2. Colostomy in place (HCC)    3. SBO (small bowel obstruction) ([facility])    4. Frequent diarrhea        Chief Complaint   Chief Complaint   Patient presents with    New Patient     NP- 12/1/24 Hospital f/u SBO- pt reports colostomy, bowel resection in 2018, recently moved from [facility]       History of Present Illness   This is a very nice Chinese 74-year-old female who recently moved to the area from [facility] and presents to clinic to establish care with me.  Patient has a very complicated surgical history.  She was recently seen by my partner, Dr. Sagastume, when she was hospitalized from 12/1/2024 - 12/6/2024 with a small bowel obstruction that resolved with nonoperative management.  Patient is accompanied by her daughter today.  Patient lives with her daughter.  Patient was offered an  but preferred to use her daughter for interpretation.    Patient has a significant abdominal surgical history including hysterectomy for cervical cancer followed by chemoradiation in 2006. She then had another episode of bowel obstruction requiring exploratory laparotomy which was complicated by an enterotomy with a leak needing multiple takeback's for abdominal washouts  Patient was given an ileostomy during this time, this was done in 2019. 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 Patient then underwent pelvic exenteration with bowel resection and muscle flap reconstruction in 2022.  Patient currently has an end ostomy and nephrostomy tube in place.    Patient typically has loose stool output from her ostomy.  She tried taking MiraLAX immediately prior to her small bowel obstruction.  She is currently not taking any fiber supplements, laxatives or stool softeners.  This was her first bowel obstruction she has had since her most recent abdominal surgery.  She currently denies  any abdominal pain, nausea, vomiting or bloating.  She has been eating well since being discharged from the hospital.  No recent weight loss.  She is maintaining a seal around her ostomy without leakage and denies any bulge around her stoma.  She does have intermittent peristomal skin irritation.      Allergies  Anmol is allergic to famotidine.    Past Medical / Surgical / Social / Family History    The past medical and past surgical history have been reviewed by me today.    Past Medical History:    Cancer (HCC)    cervicle    CKD (chronic kidney disease)     Past Surgical History:   Procedure Laterality Date    Other surgical history      BLAADER REMOVAL    Part removal colon w end colostomy         The family history and social history have been reviewed by me today.    Family History   Problem Relation Age of Onset    Stroke Father     Stroke Mother      Social History     Socioeconomic History    Marital status: Unknown   Tobacco Use    Smoking status: Never     Passive exposure: Never    Smokeless tobacco: Never   Vaping Use    Vaping status: Never Used   Substance and Sexual Activity    Alcohol use: Never    Drug use: Never   Other Topics Concern    Caffeine Concern No    Exercise No    Seat Belt No    Special Diet No    Stress Concern No    Weight Concern No        Current Outpatient Medications:     rosuvastatin 5 MG Oral Tab, Take 1 tablet (5 mg total) by mouth daily., Disp: , Rfl:     traZODone 50 MG Oral Tab, Take 1 tablet (50 mg total) by mouth nightly., Disp: 90 tablet, Rfl: 0    Ferrous Sulfate 325 (65 Fe) MG Oral Tab, Take 1 tablet (325 mg total) by mouth daily with breakfast., Disp: 90 tablet, Rfl: 0    Ascorbic Acid (VITAMIN C OR), Take by mouth daily., Disp: , Rfl:     Coenzyme Q10 100 MG Oral Cap, Take 300 mg by mouth daily., Disp: , Rfl:     omega-3 fatty acids 1000 MG Oral Cap, Take 1,000 mg by mouth daily., Disp: , Rfl:     sodium bicarbonate 650 MG Oral Tab, Take 1 tablet (650 mg total) by  mouth 2 (two) times daily., Disp: , Rfl:     SF 5000 PLUS 1.1 % Dental Cream, Apply a thin ribbon of paste to a toothbrush. Brush thoroughly once daily for 2 minutes, preferably at bedtime. Spit the paste out after use and do not eat, drink, or rinse for 30 minutes. Do not swallow the paste. Use regular toothpaste in the morning., Disp: , Rfl:       Review of Systems  A 10 point review of systems was performed and negative unless otherwise documented per HPI.    Physical Findings   /59   Pulse 66   Temp 97.9 °F (36.6 °C) (Temporal)   Resp 18   SpO2 99%   Physical Exam  Vitals and nursing note reviewed. Exam conducted with a chaperone present.   Constitutional:       General: She is not in acute distress.  HENT:      Head: Normocephalic and atraumatic.      Mouth/Throat:      Mouth: Mucous membranes are moist.   Cardiovascular:      Rate and Rhythm: Normal rate and regular rhythm.   Pulmonary:      Effort: Pulmonary effort is normal.   Abdominal:      General: There is no distension.      Palpations: Abdomen is soft.      Tenderness: There is no abdominal tenderness.      Hernia: No hernia is present.      Comments: Well-healed midline scar.  Left-sided ostomy pink and productive of pasty brown stool   Musculoskeletal:         General: No deformity.   Skin:     General: Skin is warm and dry.   Neurological:      General: No focal deficit present.      Mental Status: She is alert.   Psychiatric:         Mood and Affect: Mood normal.             Assessment   1. Stoma dermatitis    2. Colostomy in place (HCC)    3. SBO (small bowel obstruction) (HCC)    4. Frequent diarrhea        This is a very nice Chinese 74-year-old female who recently moved to the area from [facility] and presents to clinic to establish care with me.  Patient has a very complicated surgical history.  She was recently seen by my partner, Dr. Sagastume, when she was hospitalized from 12/1/2024 - 12/6/2024 with a small bowel  obstruction that resolved with nonoperative management.  Patient is accompanied by her daughter today.  Patient lives with her daughter.  Patient was offered an  but preferred to use her daughter for interpretation.    Patient has a significant abdominal surgical history including hysterectomy for cervical cancer followed by chemoradiation in 2006. She then had another episode of bowel obstruction requiring exploratory laparotomy which was complicated by an enterotomy with a leak needing multiple takeback's for abdominal washouts  Patient was given an ileostomy during this time, this was done in 2019. 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 Patient then underwent pelvic exenteration with bowel resection and muscle flap reconstruction in 2022.  Patient currently has an end ostomy and nephrostomy tube in place.    Patient typically has loose stool output from her ostomy.  She tried taking MiraLAX immediately prior to her small bowel obstruction.  She is currently not taking any fiber supplements, laxatives or stool softeners.  This was her first bowel obstruction she has had since her most recent abdominal surgery.  She currently denies any abdominal pain, nausea, vomiting or bloating.  She has been eating well since being discharged from the hospital.  No recent weight loss.  She is maintaining a seal around her ostomy without leakage and denies any bulge around her stoma.  She does have intermittent peristomal skin irritation.    Patient is generally well-appearing on exam today.    Plan  I counseled patient and her daughter that patient would be extremely high-risk for any future abdominal surgery.  I would recommend against any further abdominal surgery unless she develops an acute life-threatening intra-abdominal issue.    I am happy to see the patient on an annual basis to maintain follow-up and support her ostomy supplies.  I am not sure what  is causing her loose stool.  She may have some form of short gut syndrome and simply be prone to loose stool.  That being said, she seems well-nourished and has not been losing weight lately.  I would recommend accepting her loose stool rather than trying to constipate her leading to further issues with bowel obstruction.  Patient is welcome to follow-up with her gastroenterologist, Dr. Dubose, if she feels the loose stools are problematic.    Lastly, I recommended establishing care with the wound care/ostomy nursing team to troubleshoot any peristomal skin irritation issues.  I will plan to see the patient back in 1 years time for routine follow-up.  She is welcome to contact me with any surgical questions or concerns in the meantime.     No orders of the defined types were placed in this encounter.      Imaging & Referrals   OP REFERRAL OSTOMY CLINIC    Follow Up  No follow-ups on file.    Jordan Landin MD

## 2025-01-23 ENCOUNTER — LAB ENCOUNTER (OUTPATIENT)
Dept: LAB | Age: 75
End: 2025-01-23
Attending: INTERNAL MEDICINE
Payer: MEDICARE

## 2025-01-23 DIAGNOSIS — N18.30 STAGE 3 CHRONIC KIDNEY DISEASE, UNSPECIFIED WHETHER STAGE 3A OR 3B CKD (HCC): ICD-10-CM

## 2025-01-23 DIAGNOSIS — E61.1 IRON DEFICIENCY: ICD-10-CM

## 2025-01-23 DIAGNOSIS — N13.9 OBSTRUCTIVE UROPATHY: ICD-10-CM

## 2025-01-23 DIAGNOSIS — E87.5 HYPERKALEMIA: ICD-10-CM

## 2025-01-23 LAB
ANION GAP SERPL CALC-SCNC: 11 MMOL/L (ref 0–18)
BASOPHILS # BLD AUTO: 0.03 X10(3) UL (ref 0–0.2)
BASOPHILS NFR BLD AUTO: 0.7 %
BUN BLD-MCNC: 26 MG/DL (ref 9–23)
CALCIUM BLD-MCNC: 9.7 MG/DL (ref 8.7–10.6)
CHLORIDE SERPL-SCNC: 107 MMOL/L (ref 98–112)
CO2 SERPL-SCNC: 22 MMOL/L (ref 21–32)
CREAT BLD-MCNC: 1.46 MG/DL
DEPRECATED HBV CORE AB SER IA-ACNC: 159 NG/ML
EGFRCR SERPLBLD CKD-EPI 2021: 38 ML/MIN/1.73M2 (ref 60–?)
EOSINOPHIL # BLD AUTO: 0.07 X10(3) UL (ref 0–0.7)
EOSINOPHIL NFR BLD AUTO: 1.7 %
ERYTHROCYTE [DISTWIDTH] IN BLOOD BY AUTOMATED COUNT: 13.9 %
FASTING STATUS PATIENT QL REPORTED: YES
GLUCOSE BLD-MCNC: 81 MG/DL (ref 70–99)
HCT VFR BLD AUTO: 33.6 %
HGB BLD-MCNC: 11.3 G/DL
IMM GRANULOCYTES # BLD AUTO: 0.01 X10(3) UL (ref 0–1)
IMM GRANULOCYTES NFR BLD: 0.2 %
IRON SATN MFR SERPL: 28 %
IRON SERPL-MCNC: 89 UG/DL
LYMPHOCYTES # BLD AUTO: 1.54 X10(3) UL (ref 1–4)
LYMPHOCYTES NFR BLD AUTO: 37.2 %
MCH RBC QN AUTO: 32.9 PG (ref 26–34)
MCHC RBC AUTO-ENTMCNC: 33.6 G/DL (ref 31–37)
MCV RBC AUTO: 98 FL
MONOCYTES # BLD AUTO: 0.37 X10(3) UL (ref 0.1–1)
MONOCYTES NFR BLD AUTO: 8.9 %
NEUTROPHILS # BLD AUTO: 2.12 X10 (3) UL (ref 1.5–7.7)
NEUTROPHILS # BLD AUTO: 2.12 X10(3) UL (ref 1.5–7.7)
NEUTROPHILS NFR BLD AUTO: 51.3 %
OSMOLALITY SERPL CALC.SUM OF ELEC: 294 MOSM/KG (ref 275–295)
PLATELET # BLD AUTO: 179 10(3)UL (ref 150–450)
POTASSIUM SERPL-SCNC: 3.9 MMOL/L (ref 3.5–5.1)
RBC # BLD AUTO: 3.43 X10(6)UL
SODIUM SERPL-SCNC: 140 MMOL/L (ref 136–145)
TOTAL IRON BINDING CAPACITY: 319 UG/DL (ref 250–425)
TRANSFERRIN SERPL-MCNC: 249 MG/DL (ref 250–380)
WBC # BLD AUTO: 4.1 X10(3) UL (ref 4–11)

## 2025-01-23 PROCEDURE — 83550 IRON BINDING TEST: CPT

## 2025-01-23 PROCEDURE — 83540 ASSAY OF IRON: CPT

## 2025-01-23 PROCEDURE — 82728 ASSAY OF FERRITIN: CPT

## 2025-01-23 PROCEDURE — 85025 COMPLETE CBC W/AUTO DIFF WBC: CPT

## 2025-01-23 PROCEDURE — 80048 BASIC METABOLIC PNL TOTAL CA: CPT

## 2025-01-23 PROCEDURE — 36415 COLL VENOUS BLD VENIPUNCTURE: CPT

## 2025-01-29 ENCOUNTER — OFFICE VISIT (OUTPATIENT)
Dept: NEPHROLOGY | Facility: CLINIC | Age: 75
End: 2025-01-29
Payer: MEDICARE

## 2025-01-29 VITALS — BODY MASS INDEX: 19 KG/M2 | WEIGHT: 98.5 LBS | SYSTOLIC BLOOD PRESSURE: 112 MMHG | DIASTOLIC BLOOD PRESSURE: 56 MMHG

## 2025-01-29 DIAGNOSIS — G47.00 INSOMNIA, UNSPECIFIED TYPE: ICD-10-CM

## 2025-01-29 DIAGNOSIS — N13.9 OBSTRUCTIVE UROPATHY: ICD-10-CM

## 2025-01-29 DIAGNOSIS — N18.30 STAGE 3 CHRONIC KIDNEY DISEASE, UNSPECIFIED WHETHER STAGE 3A OR 3B CKD (HCC): Primary | ICD-10-CM

## 2025-01-29 PROCEDURE — 99214 OFFICE O/P EST MOD 30 MIN: CPT | Performed by: INTERNAL MEDICINE

## 2025-01-29 NOTE — PROGRESS NOTES
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 Recent nuc med renogram with split function 65/35. SCr < 1 mg/dl prior to developing obstruction last yr. No other risk factors for kidney disease.  Meds are benign without chronic analgesic or PPI use.  There are no signs of other systemic process such as vasculitis or autoimmune disease.  There is no family history of kidney disease.  She does not have HIV or hepatitis risk factors.  Her urine sediment is bland does not support an underlying glomerulopathy. PLAN- d/w pt / daughter at length. No further w/u indicated; no absolute indication for ACE-I / ARB. Last PNT exchange 11/24 -> to schedule in Feb / q3 months. In the absence of recurrent obstruction, expect renal function to remain stable for the foreseeable future.      2) HTN- borderline HTN SBP 130s/70; no absolute indication for tx.    3) Hyperlipidemia- recheck lipids on crestor 5 mg daily    4) Cervical CA s/p LINDSEY / XRT / chemo and complications as above    5) + colostomy + R PNT    6) Chronic LE edema due to h/o extensive pelvic surgery + XRT- no indication for diuretic, LE dopplers etc.        HPI:   Anmol Gonzalez is a 74 year old female with   Chief Complaint   Patient presents with    Chronic Kidney Disease    Hypertension     Jess Esquivel MD    Very pleasant 74-year-old female with a complex medical history having undergone hysterectomy, radiation and chemotherapy for cervical cancer diagnosed in 2009; this is complicated by multiple issues as noted above.  Was found to have obstructive uropathy last year after developing worsening kidney function which improved after percutaneous  nephrostomy tube placement.    ROS:    Denies fever/chills  Denies wt loss/gain  Denies HA or visual changes  Denies CP or palpitations  Denies SOB/cough/hemoptysis  Denies abd or flank pain  Denies N/V/D  Denies change in urinary habits or gross hematuria  Denies LE edema  Denies skin rashes/myalgias/arthralgias    PMH:  Past Medical History:    Cancer (HCC)    cervicle    CKD (chronic kidney disease)       PSH:  Past Surgical History:   Procedure Laterality Date    Other surgical history      BLAADER REMOVAL    Part removal colon w end colostomy         Medications (Active prior to today's visit):  Current Outpatient Medications   Medication Sig Dispense Refill    rosuvastatin 5 MG Oral Tab Take 1 tablet (5 mg total) by mouth daily.      traZODone 50 MG Oral Tab Take 1 tablet (50 mg total) by mouth nightly. 90 tablet 0    Ferrous Sulfate 325 (65 Fe) MG Oral Tab Take 1 tablet (325 mg total) by mouth daily with breakfast. 90 tablet 0    Ascorbic Acid (VITAMIN C OR) Take by mouth daily.      Coenzyme Q10 100 MG Oral Cap Take 300 mg by mouth daily.      omega-3 fatty acids 1000 MG Oral Cap Take 1,000 mg by mouth daily.      sodium bicarbonate 650 MG Oral Tab Take 1 tablet (650 mg total) by mouth 2 (two) times daily.      SF 5000 PLUS 1.1 % Dental Cream Apply a thin ribbon of paste to a toothbrush. Brush thoroughly once daily for 2 minutes, preferably at bedtime. Spit the paste out after use and do not eat, drink, or rinse for 30 minutes. Do not swallow the paste. Use regular toothpaste in the morning.         Allergies:  Allergies   Allergen Reactions    Famotidine NAUSEA AND VOMITING       Social History:  Social History     Socioeconomic History    Marital status: Unknown   Tobacco Use    Smoking status: Never     Passive exposure: Never    Smokeless tobacco: Never   Vaping Use    Vaping status: Never Used   Substance and Sexual Activity    Alcohol use: Never    Drug use: Never   Other Topics Concern    Caffeine  Concern No    Exercise No    Seat Belt No    Special Diet No    Stress Concern No    Weight Concern No        Family History:  Denies family history of kidney disease.    PHYSICAL EXAM:   There were no vitals taken for this visit.   Wt Readings from Last 3 Encounters:   12/11/24 98 lb 6.4 oz (44.6 kg)   12/02/24 98 lb (44.5 kg)   11/25/24 102 lb 9.6 oz (46.5 kg)     General: Alert and oriented in no apparent distress.  HEENT: No scleral icterus, MMM  Neck: Supple, no TRANG or thyromegaly  Cardiac: Regular rate and rhythm, S1, S2 normal, no murmur or rub  Lungs: Clear without wheezes, rales, rhonchi.    Abdomen: Soft, non-tender. + bowel sounds, no palpable organomegaly  Extremities: Without clubbing, cyanosis or edema.  Neurologic:  normal affect, cranial nerves grossly intact, moving all extremities  Skin: Warm and dry, no rashes        Leonela Kay MD  1/29/2025  4:22 PM

## 2025-02-03 RX ORDER — TRAZODONE HYDROCHLORIDE 50 MG/1
50 TABLET, FILM COATED ORAL NIGHTLY
Qty: 90 TABLET | Refills: 0 | Status: SHIPPED | OUTPATIENT
Start: 2025-02-03

## 2025-02-03 NOTE — TELEPHONE ENCOUNTER
Last OV relevant to medication: 12/11 and  11/4  Last refill date: 11/4     #/refills: 90/0   When pt was asked to return for OV: Return in about 2 months (around 1/4/2025) for med check.  If started on bp meds rtc in a month for bp check.    Upcoming appt/reason:   Future Appointments   Date Time Provider Department Center   2/19/2025  8:00 AM Tarun Dubose MD [facility] SUB GI   2/19/2025  9:45 AM Raymundo Mccormick MD [id number] [facility] [place] [facility]   2/24/2025  9:20 AM Jess Esquivel MD [facility] [address] [place]   12/1/2025  9:00 AM Jordan Landin MD [id number]   1/29/2026  1:30 PM Leonela Kay MD [facility] [place]       Was pt informed of any over due labs: no active labs from our office past year

## 2025-02-05 NOTE — PAT NURSING NOTE
PreOp Instructions     You are scheduled for: an Interventional Radiology Procedure     Date of Procedure: 02/13/25. CHECK IN AT 9:30 AM     Diet Instructions: Do not eat or drink anything after midnight including gum, mints, candy, etc.     Medications: Medications you are allowed to take can be taken with a sip of water the morning of your procedure     Medications to Stop: Hold herbal supplements and vitamins     Driving After Procedure: Sedation will be given so you WILL NOT be able to drive home. You will need a responsible adult  to drive you home. You can NOT take uber or taxi unless approved by your physician in advance.     Discharge Teaching: Your nurse will give you specific instructions before discharge, Most people can resume normal activities in 2-3 days, Any questions, please call the physician's office

## 2025-02-13 ENCOUNTER — HOSPITAL ENCOUNTER (OUTPATIENT)
Dept: INTERVENTIONAL RADIOLOGY/VASCULAR | Facility: HOSPITAL | Age: 75
Discharge: HOME | End: 2025-02-13
Attending: UROLOGY | Admitting: UROLOGY
Payer: MEDICARE

## 2025-02-13 VITALS
SYSTOLIC BLOOD PRESSURE: 115 MMHG | DIASTOLIC BLOOD PRESSURE: 47 MMHG | HEART RATE: 61 BPM | WEIGHT: 97 LBS | RESPIRATION RATE: 10 BRPM | BODY MASS INDEX: 18.31 KG/M2 | OXYGEN SATURATION: 100 % | TEMPERATURE: 98 F | HEIGHT: 61 IN

## 2025-02-13 DIAGNOSIS — N13.30 HYDRONEPHROSIS: ICD-10-CM

## 2025-02-13 LAB — INR: 1 (ref 0.8–1.3)

## 2025-02-13 PROCEDURE — 99152 MOD SED SAME PHYS/QHP 5/>YRS: CPT | Performed by: RADIOLOGY

## 2025-02-13 PROCEDURE — 0T25X0Z CHANGE DRAINAGE DEVICE IN KIDNEY, EXTERNAL APPROACH: ICD-10-PCS | Performed by: RADIOLOGY

## 2025-02-13 PROCEDURE — 50435 EXCHANGE NEPHROSTOMY CATH: CPT | Performed by: RADIOLOGY

## 2025-02-13 PROCEDURE — 85610 PROTHROMBIN TIME: CPT | Performed by: RADIOLOGY

## 2025-02-13 RX ORDER — MIDAZOLAM HYDROCHLORIDE 1 MG/ML
INJECTION INTRAMUSCULAR; INTRAVENOUS
Status: COMPLETED
Start: 2025-02-13 | End: 2025-02-13

## 2025-02-13 RX ORDER — IOPAMIDOL 612 MG/ML
30 INJECTION, SOLUTION INTRATHECAL
Status: COMPLETED | OUTPATIENT
Start: 2025-02-13 | End: 2025-02-13

## 2025-02-13 RX ORDER — LIDOCAINE HYDROCHLORIDE 10 MG/ML
INJECTION, SOLUTION INFILTRATION; PERINEURAL
Status: COMPLETED
Start: 2025-02-13 | End: 2025-02-13

## 2025-02-13 RX ORDER — SODIUM CHLORIDE 9 MG/ML
INJECTION, SOLUTION INTRAVENOUS CONTINUOUS
Status: DISCONTINUED | OUTPATIENT
Start: 2025-02-13 | End: 2025-02-13

## 2025-02-13 RX ORDER — CEFAZOLIN SODIUM 1 G/3ML
INJECTION, POWDER, FOR SOLUTION INTRAMUSCULAR; INTRAVENOUS
Status: COMPLETED
Start: 2025-02-13 | End: 2025-02-13

## 2025-02-13 RX ADMIN — IOPAMIDOL 10 ML: 612 INJECTION, SOLUTION INTRATHECAL at 11:45:00

## 2025-02-13 RX ADMIN — SODIUM CHLORIDE: 9 INJECTION, SOLUTION INTRAVENOUS at 09:51:00

## 2025-02-13 NOTE — PROGRESS NOTES
Pt received s/p right neph tube exchange with Dr. Brian. Neph tube site CDI, draining clear yellow urine. Recovery complete. Discharge instructions given to pt and pt's daughter. IV discontinued, pt taken down to [facility] via wheelchair for discharge.

## 2025-02-17 ENCOUNTER — TELEPHONE (OUTPATIENT)
Dept: WOUND CARE | Facility: HOSPITAL | Age: 75
End: 2025-02-17

## 2025-02-24 ENCOUNTER — LAB ENCOUNTER (OUTPATIENT)
Dept: LAB | Age: 75
End: 2025-02-24
Attending: INTERNAL MEDICINE
Payer: MEDICARE

## 2025-02-24 ENCOUNTER — OFFICE VISIT (OUTPATIENT)
Dept: INTERNAL MEDICINE CLINIC | Facility: CLINIC | Age: 75
End: 2025-02-24
Payer: MEDICARE

## 2025-02-24 VITALS
HEIGHT: 61.25 IN | WEIGHT: 93.63 LBS | DIASTOLIC BLOOD PRESSURE: 60 MMHG | TEMPERATURE: 97 F | BODY MASS INDEX: 17.45 KG/M2 | HEART RATE: 60 BPM | SYSTOLIC BLOOD PRESSURE: 130 MMHG | RESPIRATION RATE: 12 BRPM

## 2025-02-24 DIAGNOSIS — G47.00 INSOMNIA, UNSPECIFIED TYPE: ICD-10-CM

## 2025-02-24 DIAGNOSIS — I25.10 CORONARY ARTERY DISEASE INVOLVING NATIVE CORONARY ARTERY OF NATIVE HEART WITHOUT ANGINA PECTORIS: ICD-10-CM

## 2025-02-24 DIAGNOSIS — R03.0 ELEVATED BLOOD PRESSURE READING: ICD-10-CM

## 2025-02-24 DIAGNOSIS — N18.31 STAGE 3A CHRONIC KIDNEY DISEASE (HCC): ICD-10-CM

## 2025-02-24 DIAGNOSIS — E78.2 MIXED HYPERLIPIDEMIA: ICD-10-CM

## 2025-02-24 DIAGNOSIS — D64.9 ANEMIA, UNSPECIFIED TYPE: ICD-10-CM

## 2025-02-24 PROBLEM — K56.609 SBO (SMALL BOWEL OBSTRUCTION) (HCC): Status: RESOLVED | Noted: 2024-12-01 | Resolved: 2025-02-24

## 2025-02-24 RX ORDER — ZOLPIDEM TARTRATE 6.25 MG/1
6.25 TABLET, FILM COATED, EXTENDED RELEASE ORAL NIGHTLY PRN
Qty: 90 TABLET | Refills: 0 | Status: SHIPPED | OUTPATIENT
Start: 2025-02-24

## 2025-02-24 RX ORDER — METOPROLOL SUCCINATE 25 MG/1
25 TABLET, EXTENDED RELEASE ORAL DAILY
COMMUNITY
Start: 2025-01-31

## 2025-02-24 RX ORDER — LOPERAMIDE HYDROCHLORIDE 2 MG/1
2 CAPSULE ORAL AS NEEDED
COMMUNITY
Start: 2024-10-23

## 2025-02-24 NOTE — PROGRESS NOTES
[facility]    CHIEF COMPLAINT:    Chief Complaint   Patient presents with    Medication Follow-Up     No mammo. 3/23/24-neg FIT         HISTORY OF PRESENT ILLNESS:  here for follow up and med check.   Htn: Taking meds regularly. No chest pain, no shortness of breath. Bp okay today. On metoprolol per cardiology per daughter pt is taking a quarter of the metoprolol succinate 25mg tablet.     Hyperlipidemia: on statin. No muscle aches.      Cad: had a high cac score. Had an elevated lipoprotein A. On statin. Seeing cardiology.     She had a sbo in 12/2024. Treated with ngt and bowel rest. Now better.   She had been taking miralax for diarrhea and high output in her ostomy. Was asked to stay off it per surgery as it was thought that could have contributed to her sbo. She is staying hydrated.     Insomnia: trazodone not helping. She wants to go back to ambien.     Ckd 3: sees dr. Kay.     She has anemia. Iron levels are normal. She is taking an iron supplement daily but no change in the hgb.     REVIEW OF SYSTEMS:  See HPI    Current Medications:    Current Outpatient Medications   Medication Sig Dispense Refill    loperamide 2 MG Oral Cap Take 1 capsule (2 mg total) by mouth as needed.      metoprolol succinate ER 25 MG Oral Tablet 24 Hr Take 1 tablet (25 mg total) by mouth daily.      Zolpidem Tartrate ER 6.25 MG Oral Tab CR Take 1 tablet (6.25 mg total) by mouth nightly as needed for Sleep. 90 tablet 0    TRAZODONE 50 MG Oral Tab Take 1 tablet (50 mg total) by mouth nightly. 90 tablet 0    rosuvastatin 5 MG Oral Tab Take 1 tablet (5 mg total) by mouth daily.      Ferrous Sulfate 325 (65 Fe) MG Oral Tab Take 1 tablet (325 mg total) by mouth daily with breakfast. 90 tablet 0    Ascorbic Acid (VITAMIN C OR) Take by mouth daily.      Coenzyme Q10 100 MG Oral Cap Take 300 mg by mouth daily.      omega-3 fatty acids 1000 MG Oral Cap Take 1,000 mg by mouth daily.      sodium bicarbonate 650 MG Oral Tab Take 1  tablet (650 mg total) by mouth 2 (two) times daily.         PAST MEDICAL, SOCIAL, AND FAMILY HISTORY:  Tobacco:    History   Smoking Status    Never   Smokeless Tobacco    Never       PHYSICAL EXAM:  /60 (BP Location: Right arm, Patient Position: Sitting, Cuff Size: adult)   Pulse 60   Temp 97.3 °F (36.3 °C) (Temporal)   Resp 12   Ht 5' 1.25\" (1.556 m)   Wt 93 lb 9.6 oz (42.5 kg)   Breastfeeding No   BMI 17.54 kg/m²    GENERAL: well developed, well nourished,in no apparent distress  LUNGS: clear to auscultation  CARDIO: RRR without murmur  GI: good BS's,no masses, HSM or tenderness  MUSCULOSKELETAL:  no edema, muscle strength normal.     DATA:  Results for orders placed or performed during the hospital encounter of 02/13/25   POCT Coagucheck    Collection Time: 02/13/25  9:51 AM   Result Value Ref Range    INR 1.0 0.8 - 1.3    INR Cartridge [id number]           ASSESSMENT AND PLAN:  1. Elevated blood pressure reading  Continue metoprolol. She takes a quarter tablet.   Advised that that is likely not effective as tablets are not usually cut into quarters. Advised to discuss with cardiology.     2. Mixed hyperlipidemia  Continue statin.   - Hepatic Function Panel (7) [E]; Future  - Lipid Panel; Future    3. Coronary artery disease involving native coronary artery of native heart without angina pectoris  follow up with cardiology.     4. Insomnia, unspecified type  Discussed risks of ambien.   This is the only thing that works for her.   Discussed sleep behavioral therapy. Per daughter she thinks this is very superficial and not helpful. Pt has been dealing with insomnia for over 30 years and she has tried many different things including sleep hygiene.   Will restart low dose zolpidem er 6.25mg nightly.   Given information for the center for sleep medicine, to see them for further management of insomnia.   She denies that she has anxiety.   - Zolpidem Tartrate ER 6.25 MG Oral Tab CR; Take 1 tablet (6.25 mg  total) by mouth nightly as needed for Sleep.  Dispense: 90 tablet; Refill: 0    5. Stage 3a chronic kidney disease (HCC)  follow up with dr. Kay.     6. Anemia, unspecified type  Could be anemia of chronic disease, sec to her chronic conditions vs ckd.   - Oncology/Hematology Referral - In Network        Return in about 3 months (around 5/24/2025) for supervisit.      Jess Esquivel MD

## 2025-02-25 ENCOUNTER — TELEPHONE (OUTPATIENT)
Dept: INTERNAL MEDICINE CLINIC | Facility: CLINIC | Age: 75
End: 2025-02-25

## 2025-02-25 ENCOUNTER — LAB ENCOUNTER (OUTPATIENT)
Dept: LAB | Age: 75
End: 2025-02-25
Attending: INTERNAL MEDICINE
Payer: MEDICARE

## 2025-02-25 DIAGNOSIS — E78.2 MIXED HYPERLIPIDEMIA: ICD-10-CM

## 2025-02-25 LAB
ALBUMIN SERPL-MCNC: 4.5 G/DL (ref 3.2–4.8)
ALP LIVER SERPL-CCNC: 53 U/L
ALT SERPL-CCNC: 87 U/L
AST SERPL-CCNC: 43 U/L (ref ?–34)
BILIRUB DIRECT SERPL-MCNC: 0.3 MG/DL (ref ?–0.3)
BILIRUB SERPL-MCNC: 1 MG/DL (ref 0.2–1.1)
CHOLEST SERPL-MCNC: 168 MG/DL (ref ?–200)
FASTING PATIENT LIPID ANSWER: YES
HDLC SERPL-MCNC: 69 MG/DL (ref 40–59)
LDLC SERPL CALC-MCNC: 84 MG/DL (ref ?–100)
NONHDLC SERPL-MCNC: 99 MG/DL (ref ?–130)
PROT SERPL-MCNC: 7.7 G/DL (ref 5.7–8.2)
TRIGL SERPL-MCNC: 81 MG/DL (ref 30–149)
VLDLC SERPL CALC-MCNC: 13 MG/DL (ref 0–30)

## 2025-02-25 PROCEDURE — 80061 LIPID PANEL: CPT

## 2025-02-25 PROCEDURE — 36415 COLL VENOUS BLD VENIPUNCTURE: CPT

## 2025-02-25 PROCEDURE — 80076 HEPATIC FUNCTION PANEL: CPT

## 2025-02-25 NOTE — TELEPHONE ENCOUNTER
Pt's daughter calling because pt saw Dr. Esquivel yesterday and lipid panel was ordered. Daughter states that she was told the lipid panel was supposed to be collected as soon as possible but the expected date is 8/10/25. She went to the lab today and has already gotten blood work done. She was calling so that the expected date can be changed.

## 2025-02-25 NOTE — TELEPHONE ENCOUNTER
Lipid panel says expected in August but the lab did draw this today and it is in process. Did you want this now or in August? Thanks!

## 2025-04-11 DIAGNOSIS — G47.00 INSOMNIA, UNSPECIFIED TYPE: ICD-10-CM

## 2025-04-14 NOTE — TELEPHONE ENCOUNTER
Last OV relevant to medication: 2/24/25  Last refill date: 2/3/25 #90/refills: 0  When pt was asked to return for OV: 5/24/25  Upcoming appt/reason:   Future Appointments   Date Time Provider Department Center   6/5/2025  9:20 AM Jess Esquivel MD [facility] [address] [place]   12/1/2025  9:00 AM Jordan Landin MD [facility] [id number]   1/29/2026  1:30 PM Leonela Kay MD [facility] [place]   Was pt informed of any over due labs: utd

## 2025-04-15 RX ORDER — TRAZODONE HYDROCHLORIDE 50 MG/1
50 TABLET ORAL NIGHTLY
Qty: 90 TABLET | Refills: 0 | OUTPATIENT
Start: 2025-04-15

## 2025-04-15 NOTE — TELEPHONE ENCOUNTER
See last note. Pt and daughter said trazodone not working and we went back to a lower dose of ambien. She should stop the trazodone since it did not work for her. Please inform pt's daughter.

## 2025-04-23 ENCOUNTER — HOSPITAL ENCOUNTER (INPATIENT)
Facility: HOSPITAL | Age: 75
LOS: 3 days | Discharge: HOME | End: 2025-04-27
Attending: EMERGENCY MEDICINE | Admitting: HOSPITALIST
Payer: MEDICARE

## 2025-04-23 ENCOUNTER — APPOINTMENT (OUTPATIENT)
Dept: CT IMAGING | Facility: HOSPITAL | Age: 75
End: 2025-04-23
Attending: EMERGENCY MEDICINE
Payer: MEDICARE

## 2025-04-23 DIAGNOSIS — K56.609 SBO (SMALL BOWEL OBSTRUCTION) (HCC): Primary | ICD-10-CM

## 2025-04-23 LAB
ALBUMIN SERPL-MCNC: 5 G/DL (ref 3.2–4.8)
ALBUMIN/GLOB SERPL: 1.6 {RATIO} (ref 1–2)
ALP LIVER SERPL-CCNC: 59 U/L (ref 55–142)
ALT SERPL-CCNC: 126 U/L (ref 10–49)
ANION GAP SERPL CALC-SCNC: 17 MMOL/L (ref 0–18)
AST SERPL-CCNC: 76 U/L (ref ?–34)
BASOPHILS # BLD AUTO: 0.03 X10(3) UL (ref 0–0.2)
BASOPHILS NFR BLD AUTO: 0.4 %
BILIRUB SERPL-MCNC: 0.9 MG/DL (ref 0.2–1.1)
BUN BLD-MCNC: 27 MG/DL (ref 9–23)
CALCIUM BLD-MCNC: 10.7 MG/DL (ref 8.7–10.6)
CHLORIDE SERPL-SCNC: 100 MMOL/L (ref 98–112)
CO2 SERPL-SCNC: 22 MMOL/L (ref 21–32)
CREAT BLD-MCNC: 1.58 MG/DL (ref 0.55–1.02)
EGFRCR SERPLBLD CKD-EPI 2021: 34 ML/MIN/1.73M2 (ref 60–?)
EOSINOPHIL # BLD AUTO: 0.03 X10(3) UL (ref 0–0.7)
EOSINOPHIL NFR BLD AUTO: 0.4 %
ERYTHROCYTE [DISTWIDTH] IN BLOOD BY AUTOMATED COUNT: 12.7 %
GLOBULIN PLAS-MCNC: 3.2 G/DL (ref 2–3.5)
GLUCOSE BLD-MCNC: 116 MG/DL (ref 70–99)
HCT VFR BLD AUTO: 37 % (ref 35–48)
HGB BLD-MCNC: 12.9 G/DL (ref 12–16)
IMM GRANULOCYTES # BLD AUTO: 0.02 X10(3) UL (ref 0–1)
IMM GRANULOCYTES NFR BLD: 0.3 %
LIPASE SERPL-CCNC: 68 U/L (ref 12–53)
LYMPHOCYTES # BLD AUTO: 1.73 X10(3) UL (ref 1–4)
LYMPHOCYTES NFR BLD AUTO: 23.4 %
MCH RBC QN AUTO: 32.3 PG (ref 26–34)
MCHC RBC AUTO-ENTMCNC: 34.9 G/DL (ref 31–37)
MCV RBC AUTO: 92.5 FL (ref 80–100)
MONOCYTES # BLD AUTO: 0.35 X10(3) UL (ref 0.1–1)
MONOCYTES NFR BLD AUTO: 4.7 %
NEUTROPHILS # BLD AUTO: 5.22 X10 (3) UL (ref 1.5–7.7)
NEUTROPHILS # BLD AUTO: 5.22 X10(3) UL (ref 1.5–7.7)
NEUTROPHILS NFR BLD AUTO: 70.8 %
OSMOLALITY SERPL CALC.SUM OF ELEC: 294 MOSM/KG (ref 275–295)
PLATELET # BLD AUTO: 217 10(3)UL (ref 150–450)
POTASSIUM SERPL-SCNC: 4.1 MMOL/L (ref 3.5–5.1)
PROT SERPL-MCNC: 8.2 G/DL (ref 5.7–8.2)
RBC # BLD AUTO: 4 X10(6)UL (ref 3.8–5.3)
SODIUM SERPL-SCNC: 139 MMOL/L (ref 136–145)
WBC # BLD AUTO: 7.4 X10(3) UL (ref 4–11)

## 2025-04-23 PROCEDURE — 74176 CT ABD & PELVIS W/O CONTRAST: CPT | Performed by: EMERGENCY MEDICINE

## 2025-04-23 PROCEDURE — 0D9670Z DRAINAGE OF STOMACH WITH DRAINAGE DEVICE, VIA NATURAL OR ARTIFICIAL OPENING: ICD-10-PCS | Performed by: EMERGENCY MEDICINE

## 2025-04-23 RX ORDER — SODIUM CHLORIDE 9 MG/ML
INJECTION, SOLUTION INTRAVENOUS CONTINUOUS
Status: DISCONTINUED | OUTPATIENT
Start: 2025-04-23 | End: 2025-04-25

## 2025-04-23 RX ORDER — ONDANSETRON 2 MG/ML
4 INJECTION INTRAMUSCULAR; INTRAVENOUS ONCE
Status: COMPLETED | OUTPATIENT
Start: 2025-04-23 | End: 2025-04-23

## 2025-04-23 RX ORDER — MORPHINE SULFATE 4 MG/ML
4 INJECTION, SOLUTION INTRAMUSCULAR; INTRAVENOUS EVERY 30 MIN PRN
Status: DISCONTINUED | OUTPATIENT
Start: 2025-04-23 | End: 2025-04-24

## 2025-04-24 ENCOUNTER — APPOINTMENT (OUTPATIENT)
Dept: GENERAL RADIOLOGY | Facility: HOSPITAL | Age: 75
End: 2025-04-24
Attending: EMERGENCY MEDICINE
Payer: MEDICARE

## 2025-04-24 PROBLEM — K56.609 SBO (SMALL BOWEL OBSTRUCTION) (HCC): Status: ACTIVE | Noted: 2025-04-24

## 2025-04-24 PROBLEM — I10 PRIMARY HYPERTENSION: Chronic | Status: ACTIVE | Noted: 2025-04-24

## 2025-04-24 PROBLEM — D50.8 IRON DEFICIENCY ANEMIA SECONDARY TO INADEQUATE DIETARY IRON INTAKE: Chronic | Status: ACTIVE | Noted: 2025-04-24

## 2025-04-24 LAB — LACTATE SERPL-SCNC: 1.2 MMOL/L (ref 0.5–2)

## 2025-04-24 PROCEDURE — 99223 1ST HOSP IP/OBS HIGH 75: CPT | Performed by: HOSPITALIST

## 2025-04-24 PROCEDURE — 71045 X-RAY EXAM CHEST 1 VIEW: CPT | Performed by: EMERGENCY MEDICINE

## 2025-04-24 PROCEDURE — 99222 1ST HOSP IP/OBS MODERATE 55: CPT | Performed by: STUDENT IN AN ORGANIZED HEALTH CARE EDUCATION/TRAINING PROGRAM

## 2025-04-24 RX ORDER — ONDANSETRON 2 MG/ML
4 INJECTION INTRAMUSCULAR; INTRAVENOUS EVERY 6 HOURS PRN
Status: DISCONTINUED | OUTPATIENT
Start: 2025-04-24 | End: 2025-04-27

## 2025-04-24 RX ORDER — METOCLOPRAMIDE HYDROCHLORIDE 5 MG/ML
5 INJECTION INTRAMUSCULAR; INTRAVENOUS EVERY 8 HOURS PRN
Status: DISCONTINUED | OUTPATIENT
Start: 2025-04-24 | End: 2025-04-27

## 2025-04-24 RX ORDER — SODIUM CHLORIDE 9 MG/ML
INJECTION, SOLUTION INTRAVENOUS CONTINUOUS
Status: DISCONTINUED | OUTPATIENT
Start: 2025-04-24 | End: 2025-04-25

## 2025-04-24 RX ORDER — MORPHINE SULFATE 4 MG/ML
2 INJECTION, SOLUTION INTRAMUSCULAR; INTRAVENOUS EVERY 2 HOUR PRN
Status: DISCONTINUED | OUTPATIENT
Start: 2025-04-24 | End: 2025-04-27

## 2025-04-24 RX ORDER — MORPHINE SULFATE 4 MG/ML
1 INJECTION, SOLUTION INTRAMUSCULAR; INTRAVENOUS EVERY 2 HOUR PRN
Status: DISCONTINUED | OUTPATIENT
Start: 2025-04-24 | End: 2025-04-27

## 2025-04-24 RX ORDER — HEPARIN SODIUM 5000 [USP'U]/ML
5000 INJECTION, SOLUTION INTRAVENOUS; SUBCUTANEOUS EVERY 8 HOURS SCHEDULED
Status: DISCONTINUED | OUTPATIENT
Start: 2025-04-24 | End: 2025-04-27

## 2025-04-24 RX ORDER — MORPHINE SULFATE 4 MG/ML
4 INJECTION, SOLUTION INTRAMUSCULAR; INTRAVENOUS EVERY 2 HOUR PRN
Status: DISCONTINUED | OUTPATIENT
Start: 2025-04-24 | End: 2025-04-27

## 2025-04-24 NOTE — DIETARY NOTE
[facility]   part of [facility]   CLINICAL NUTRITION    Anmol Gonzalez     Admitting diagnosis:  SBO (small bowel obstruction) ([facility]) [K56.609]    Ht:  155.6 cm (5' 1\")  Wt: 44.6 kg (98 lb 6.4 oz).   Body mass index is 18.44 kg/m².  IBW: 47.7 kg    Wt Readings from Last 6 Encounters:   04/24/25 44.6 kg (98 lb 6.4 oz)   02/24/25 42.5 kg (93 lb 9.6 oz)   02/05/25 44 kg (97 lb)   01/29/25 44.7 kg (98 lb 8 oz)   12/11/24 44.6 kg (98 lb 6.4 oz)   12/02/24 44.5 kg (98 lb)        Labs/Meds reviewed    Diet:       Procedures    NPO     Percent Meals Eaten (last 3 days)       Date/Time Percent Meals Eaten (%)    04/24/25 0813 0 %     Percent Meals Eaten (%): npo at 04/24/25 0813              Pt chart reviewed d/t low BMI. 76 y/o female with PMH surgery of bowel obstructions presents with abd pain and vomiting, found to have small bowel obstruction. Per chart review, pt here recently in December for SBO. Per chart review, pt's weight is stable over past 6 months, is usually around 100 lbs. Pt currently 98 lbs, no significant weight loss noted. Pt NPO for SBO, NGT to LIS. Will monitor for timely diet adv and follow up as able.     Patient reports good appetite at this time.  Nursing notes reports Percent Meals Eaten (%): 0 % (npo) intake for last meal.  Tolerating po diet without diarrhea, emesis, or constipation.   No significant weight changes noted.     Patient is at low nutrition risk at this time.    Please consult if patient status changes or nutrition issues arise.      Lara Damian, MS, RDN, LDN  Clinical Dietitian

## 2025-04-24 NOTE — ED PROVIDER NOTES
Patient Seen in: [facility] Emergency Department      History     Chief Complaint   Patient presents with    Abdomen/Flank Pain     Stated Complaint: abd pain/vomiting hx of frequent obstruction    Subjective:   HPI    75-year-old female presenting to the emergency department for abdominal pain and vomiting.  Patient has a history of obstructions family she has developed no obstruction to the foods due to the food she was eating.  Patient yesterday's are having some lower abdominal discomfort soon followed by vomiting no increased output from her ostomy no fevers chills no recent cough or cold no other exacerbating or relieving factors associated symptom.  History of Present Illness               Objective:     Past Medical History:    Cancer (HCC)    cervicle    CKD (chronic kidney disease)              Past Surgical History:   Procedure Laterality Date    Other surgical history      BLAADER REMOVAL    Part removal colon w end colostomy                  Social History     Socioeconomic History    Marital status:    Tobacco Use    Smoking status: Never     Passive exposure: Never    Smokeless tobacco: Never   Vaping Use    Vaping status: Never Used   Substance and Sexual Activity    Alcohol use: Never    Drug use: Never   Other Topics Concern    Caffeine Concern No    Exercise No    Seat Belt No    Special Diet No    Stress Concern No    Weight Concern No     Social Drivers of Health     Food Insecurity: No Food Insecurity (2/24/2025)    NCSS - Food Insecurity     Worried About Running Out of Food in the Last Year: No     Ran Out of Food in the Last Year: No   Transportation Needs: No Transportation Needs (2/24/2025)    NCSS - Transportation     Lack of Transportation: No   Housing Stability: Not At Risk (2/24/2025)    NCSS - Housing/Utilities     Has Housing: Yes     Worried About Losing Housing: No     Unable to Get Utilities: No                                Physical Exam     ED Triage Vitals   BP  04/23/25 2245 (!) 182/72   Pulse 04/23/25 2245 59   Resp 04/23/25 2245 17   Temp 04/23/25 2330 98.4 °F (36.9 °C)   Temp src 04/23/25 2330 Temporal   SpO2 04/23/25 2245 95 %   O2 Device 04/23/25 2245 None (Room air)       Current Vitals:   Vital Signs  BP: (!) 166/67  Pulse: 61  Resp: 12  Temp: 98.4 °F (36.9 °C)  Temp src: Temporal  MAP (mmHg): 96    Oxygen Therapy  SpO2: 100 %  O2 Device: None (Room air)        Physical Exam  Awake oriented patient appears in no distress but appears uncomfortable HEENT exam is normal lungs are clear cardiovascular exam regular rhythm abdomen tender diffusely no rebound tenderness extremities no Cyanosis or edema no rash skin is nondiaphoretic moving all extremities with no focal deficits  Physical Exam                ED Course     Labs Reviewed   COMP METABOLIC PANEL (14) - Abnormal; Notable for the following components:       Result Value    Glucose 116 (*)     BUN 27 (*)     Creatinine 1.58 (*)     Calcium, Total 10.7 (*)     eGFR-Cr 34 (*)     AST 76 (*)      (*)     Albumin 5.0 (*)     All other components within normal limits   LIPASE - Abnormal; Notable for the following components:    Lipase 68 (*)     All other components within normal limits   CBC WITH DIFFERENTIAL WITH PLATELET   LACTIC ACID, PLASMA   RAINBOW DRAW BLUE       ED Course as of 04/24/25 0038  ------------------------------------------------------------  Time: 04/23 2347  Value: CBC With Differential With Platelet  Comment: Unremarkable  ------------------------------------------------------------  Time: 04/23 2348  Value: Comp Metabolic Panel (14)(!)  Comment: Renal sufficiency noted and elevated liver enzymes  ------------------------------------------------------------  Time: 04/23 5287  Value: Lipase(!)  Comment: Elevated     Results            Differential diagnosis includes perforation, obstruction     A total of 35 minutes of critical care time (exclusive of billable procedures) was administered  to manage the patient's critical imaging findings due to her small bowel obstruction.  This involved direct patient intervention, complex decision making, and/or extensive discussions with the patient, family, and clinical staff.                  [facility]          Admission disposition: 4/24/2025 12:38 AM           Medical Decision Making  75-year-old female presenting to the emergency department for abdominal pain and vomiting.  IV established cardiac monitor shows a sinus rhythm pulse ox shows no signs of hypoxia.  White cell count is normal laboratory tests otherwise within acceptable is independent interpretation by ED physician CT scan shows small bowel obstruction.  Patient was discussed with hospitalist and general surgery patient is to have an NG tube placed.  Patient will be admitted at this time.  This note was prepared using Dragon Medical voice recognition dictation software.  As a result errors may occur.  When identified to these areas have been corrected.  While every attempt is made to correct errors during dictation discrepancies may still exist.  Please contact if there are any errors    Problems Addressed:  SBO (small bowel obstruction) (HCC): acute illness or injury    Amount and/or Complexity of Data Reviewed  Labs: ordered. Decision-making details documented in ED Course.  Radiology: ordered and independent interpretation performed. Decision-making details documented in ED Course.  ECG/medicine tests: ordered and independent interpretation performed. Decision-making details documented in ED Course.        Disposition and Plan     Clinical Impression:  1. SBO (small bowel obstruction) (HCC)         Disposition:  Admit  4/24/2025 12:38 am    Follow-up:  No follow-up provider specified.        Medications Prescribed:  Current Discharge Medication List          Supplementary Documentation:         Hospital Problems       Present on Admission  Date Reviewed: 4/23/2025          ICD-10-CM Noted POA    *  (Principal) SBO (small bowel obstruction) ([facility]) K56.609 4/24/2025 Unknown

## 2025-04-24 NOTE — PROGRESS NOTES
NURSING ADMISSION NOTE      Patient admitted via Cart  Oriented to room 0021  Safety precautions initiated.  Bed in low position.    Alert x4. Mandarin speaking- daughter, Christianne, at bedside to translate. Room air. Sats WNL. R NG tube to LIS. C/o minimal pain at this time, refusing pain meds at this time. Voids. Up SBA. Updated pt and family on POC, no questions or concerns at this time.     Call light in reach.

## 2025-04-24 NOTE — ED INITIAL ASSESSMENT (HPI)
Patient arrives with daughter and  with severe abdominal pain in RLQ and LLQ. Patient has a history of bowel obstructions and has been vomiting today. Symptoms started last night. Patient took dash seeds yesterday with milk and that is when symptoms started. Patient unable to keep food and drink down today. Patient nauseous and actively vomiting.

## 2025-04-24 NOTE — ED QUICK NOTES
Orders for admission, patient is aware of plan and ready to go upstairs. Any questions, please call ED RN Sana ALVAREZ at extension [phone].     Patient Covid vaccination status: Fully vaccinated     COVID Test Ordered in ED: None    COVID Suspicion at Admission: N/A    Running Infusions: Medication Infusions[1]     Mental Status/LOC at time of transport: A&Ox4    Other pertinent information:   CIWA score: N/A   NIH score:  N/A             [1]    sodium chloride 125 mL/hr at 04/23/25 8304

## 2025-04-24 NOTE — H&P
[facility]     Anmol Gonzalez Patient Status:  Emergency    2/15/1950 MRN [id number]   Location [facility] EMERGENCY DEPARTMENT Attending Erik Panda MD   Hosp Day # 0 PCP Jess Esquivel MD     Chief Complaint: abdominal pain    Subjective:    History of Present Illness:     Anmol Gonzalez is a 75 year old female with history of hypertension, hyperlipidemia, iron deficiency anemia, surgery of bowel obstructions in the past presents with abdominal pain and vomiting.  Started yesterday with some lower abdominal discomfort with vomiting.  Patient does have an ostomy and has had no increase in output from the ostomy.  No fevers, chills, diarrhea.  No chest pain, shortness of breath, palpitations.  No hematochezia, melena, hematuria.    History/Other:    Past Medical History:  Past Medical History[1]  Past Surgical History:   Past Surgical History[2]   Family History:   Family History[3]  Social History:    reports that she has never smoked. She has never been exposed to tobacco smoke. She has never used smokeless tobacco. She reports that she does not drink alcohol and does not use drugs.     Allergies: Allergies[4]    Medications:  Medications Ordered Prior to Encounter[5]    Review of Systems:   A comprehensive review of systems was completed.    Pertinent positives and negatives noted in the HPI.    Objective:   Physical Exam:    BP (!) 166/67   Pulse 61   Temp 98.4 °F (36.9 °C) (Temporal)   Resp 12   Wt 90 lb (40.8 kg)   SpO2 100%   BMI 16.87 kg/m²   General: No acute distress, Alert  Respiratory: No rhonchi, no wheezes  Cardiovascular: S1, S2. Regular rate and rhythm  Abdomen: Soft, Non-tender, non-distended, positive bowel sounds  Neuro: No new focal deficits  Extremities: No edema      Results:    Labs:      Labs Last 24 Hours:    Recent Labs   Lab 25  2213   RBC 4.00   HGB 12.9   HCT 37.0   MCV 92.5   MCH 32.3   MCHC 34.9   RDW 12.7   NEPRELIM 5.22   WBC 7.4   PLT  217.0       Recent Labs   Lab 04/23/25  2213   *   BUN 27*   CREATSERUM 1.58*   EGFRCR 34*   CA 10.7*   ALB 5.0*      K 4.1      CO2 22.0   ALKPHO 59   AST 76*   *   BILT 0.9   TP 8.2       Estimated Glomerular Filtration Rate: 34 mL/min/1.73m2 (A) (result from lab).    Lab Results   Component Value Date    INR 1.0 02/13/2025    INR 1.0 11/14/2024    INR 1.0 08/14/2024       No results for input(s): \"TROP\", \"TROPHS\", \"CK\" in the last 168 hours.    No results for input(s): \"TROP\", \"PBNP\" in the last 168 hours.    No results for input(s): \"PCT\" in the last 168 hours.    Imaging: Imaging data reviewed in Epic.    Assessment & Plan:      # Small bowel obstruction  - continue NGT to LIS  - General surgery on consult  - continue IV pain meds and antiemetics.    # Hyperlipidemia  - Will resume statin once tolerating diet.    # Hypertension  - Will resume metoprolol once tolerating diet.  - Will order prn IV metoprolol.    # Iron deficiency anemia  - will resume oral iron when tolerating diet.        Plan of care discussed with patient at bedside.    Carlo Aparicio, DO    Supplementary Documentation:     The 21st Century Cures Act makes medical notes like these available to patients in the interest of transparency. Please be advised this is a medical document. Medical documents are intended to carry relevant information, facts as evident, and the clinical opinion of the practitioner. The medical note is intended as peer to peer communication and may appear blunt or direct. It is written in medical language and may contain abbreviations or verbiage that are unfamiliar.                                     [1]   Past Medical History:   Cancer (HCC)    cervicle    CKD (chronic kidney disease)   [2]   Past Surgical History:  Procedure Laterality Date    Other surgical history      BLAADER REMOVAL    Part removal colon w end colostomy     [3]   Family History  Problem Relation Age of Onset     Stroke Father     Stroke Mother    [4]   Allergies  Allergen Reactions    Famotidine NAUSEA AND VOMITING   [5]   Current Facility-Administered Medications on File Prior to Encounter   Medication Dose Route Frequency Provider Last Rate Last Admin    [COMPLETED] lidocaine (Xylocaine) 1 % injection             [COMPLETED] fentaNYL (Sublimaze) 50 mcg/mL injection             [COMPLETED] midazolam (Versed) 2 MG/2ML injection             [COMPLETED] ceFAZolin (Ancef) 1 g injection             [COMPLETED] iopamidol (ISOVUE-M 300) 61 % injection 30 mL  30 mL Injection ONCE PRN Byron Brian MD   10 mL at 02/13/25 1145     Current Outpatient Medications on File Prior to Encounter   Medication Sig Dispense Refill    loperamide 2 MG Oral Cap Take 1 capsule (2 mg total) by mouth as needed.      metoprolol succinate ER 25 MG Oral Tablet 24 Hr Take 1 tablet (25 mg total) by mouth daily.      Zolpidem Tartrate ER 6.25 MG Oral Tab CR Take 1 tablet (6.25 mg total) by mouth nightly as needed for Sleep. 90 tablet 0    TRAZODONE 50 MG Oral Tab Take 1 tablet (50 mg total) by mouth nightly. 90 tablet 0    rosuvastatin 5 MG Oral Tab Take 1 tablet (5 mg total) by mouth daily.      Ferrous Sulfate 325 (65 Fe) MG Oral Tab Take 1 tablet (325 mg total) by mouth daily with breakfast. 90 tablet 0    Ascorbic Acid (VITAMIN C OR) Take by mouth daily.      Coenzyme Q10 100 MG Oral Cap Take 300 mg by mouth daily.      omega-3 fatty acids 1000 MG Oral Cap Take 1,000 mg by mouth daily.      sodium bicarbonate 650 MG Oral Tab Take 1 tablet (650 mg total) by mouth 2 (two) times daily.

## 2025-04-24 NOTE — CONSULTS
[facility]  Report of Consultation    Anmol Gonzalez Patient Status:  Inpatient    2/15/1950 MRN [id number]   Location [facility] 0SW-A Attending Racquel Martinez, DO   Hosp Day # 0 PCP Jess Esquivel MD     Requesting Physician:  Dr. Panda    Reason for Consultation:  Small bowel obstruction    Chief Complaint:  Abdominal pain    The patient's daughter, Christianne, was called to help translate as the patient is primarily Chinese speaking.    History of Present Illness:  Anmol Gonzalez is a 75 year old female who presents to [facility] with worsening abdominal pain and vomiting.    The patient is known to our service.  She has a very complicated surgical history.  She was most recently seen in clinic with Dr. Landin on 2024.  The patient was hospitalized from 2024 - 2024 with a small bowel obstruction which resolved with nonoperative management.    The patient has a significant abdominal history including hysterectomy for cervical cancer followed by chemoradiation in .  She then had another episode of bowel obstruction requiring exploratory laparotomy which was complicated by an enterotomy with a leak needing multiple takeback's for abdominal washouts.  The patient was given an ileostomy during this time which was done in .  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  She then underwent pelvic exoneration with bowel resection and muscle flap reconstruction in .  The patient currently has an end ostomy and nephrostomy tube in place.    The patient reports onset of lower abdominal pain 2 days ago.  She also reports associated nausea, vomiting and decreased output from her ostomy.  She denies fevers or chills.  She presented to [facility] for further evaluation.    Upon presentation to the hospital, the patient was afebrile and hypertensive at 182/72.  CBC reveals WBC 7.4 with no left shift, hemoglobin 12.9, platelets 217,000.  No  electrolyte abnormalities.  BUN 27, creatinine 1.58.  AST and ALT elevated at 76 and 126 respectively.  Lipase slightly elevated at 68.  Lactic acid within normal limits at 1.2.    CT of the abdomen and pelvis reveals numerous postsurgical changes with an ostomy in the left lower quadrant.  There are distended loops of bowel present with small bowel feces sign noted.  A representative loop of bowel within the right hemiabdomen measures up to 5.8 cm.  There is some air present within nondependent loops of bowel which may be due to debris.  Pneumatosis is possible but felt to be less likely.  No discrete transition point seen.    Currently, the patient is resting in bed.  An NG tube was placed in the ER.  She does report some relief of her abdominal discomfort after NG tube placement.  She states the NG tube was repositioned a few times prior to arriving to her room.  She continues to experience some suprapubic abdominal discomfort and abdominal bloating.  She currently denies nausea or vomiting.  The patient's daughter states her mother has continued to have bowel function via her ostomy, but has noticed decreased output.  She continues to pass flatus.    The patient has a significant past surgical history as listed above.  She has a significant past medical history of cervical cancer and chronic knee disease.  She denies taking blood thinning medications.    History:  Past Medical History[1]  Past Surgical History[2]  Family History[3]   reports that she has never smoked. She has never been exposed to tobacco smoke. She has never used smokeless tobacco. She reports that she does not drink alcohol and does not use drugs.    Allergies:  Allergies[4]    Medications:  Prescriptions Prior to Admission[5]    Current Hospital Medications[6]    Review of Systems:  Pertinent items are noted in HPI.  Allergic/Immuno:  Review of patient's allergies performed.  Cardiovascular:  Negative for cool extremity and irregular  heartbeat/palpitations  Constitutional:  Negative for decreased activity, fever, irritability and lethargy  Endocrine:  Negative for abnormal sleep patterns, increased activity, polydipsia and polyphagia  ENMT:  Negative for ear drainage, hearing loss and nasal drainage  Eyes:  Negative for eye discharge and vision loss  Gastrointestinal: Positive for abdominal pain, nausea, vomiting, abdominal bloating.   Genitourinary:  Negative for dysuria and hematuria  Hema/Lymph:  Negative for easy bleeding and easy bruising  Integumentary:  Negative for pruritus and rash  Musculoskeletal:  Negative for bone/joint symptoms  Neurological:  Negative for gait disturbance  Psychiatric:  Negative for inappropriate interaction and psychiatric symptoms  Respiratory:  Negative for cough, dyspnea and wheezing      Physical Exam:  Blood pressure 136/64, pulse 72, temperature 99.8 °F (37.7 °C), temperature source Oral, resp. rate 18, weight 98 lb 6.4 oz (44.6 kg), SpO2 95%, not currently breastfeeding.    General: Alert, orientated x3.  Cooperative.  No apparent distress.    HEENT: NG tube in place.  Without scleral icterus.  Mucous membranes are moist. EOM are WNL.    Neck: No tenderness to palpitation.  Full range of motion to flexion and extension, lateral rotation and lateral flexion of cervical spine.  No JVD. Supple.     Lungs: Clear to auscultation bilaterally. No wheezes, no rales, no rhonchi. Good excursion of the diaphragms. No secondary use of accessory respiratory musculature.    Cardiac: Regular rate and rhythm. No murmur.    Abdomen:  Soft, non-distended, slightly tender to palpation in suprapubic area, tympanic to percussion. No peritoneal signs. No guarding.  Well-healed midline incision.  Ostomy in left abdomen is pink and patent with some flatus and stool in the appliance.    Extremities:  No lower extremity edema noted.  Without clubbing or cyanosis.      Skin: Normal texture and turgor.    Laboratory Data:  Recent  Labs   Lab 04/23/25  2213   RBC 4.00   HGB 12.9   HCT 37.0   MCV 92.5   MCH 32.3   MCHC 34.9   RDW 12.7   NEPRELIM 5.22   WBC 7.4   .0       Recent Labs   Lab 04/23/25  2213   *   BUN 27*   CREATSERUM 1.58*   CA 10.7*   ALB 5.0*      K 4.1      CO2 22.0   ALKPHO 59   AST 76*   *   BILT 0.9   TP 8.2         No results for input(s): \"PTP\", \"INR\", \"PTT\" in the last 168 hours.      XR CHEST AP PORTABLE  (CPT=71045)  Result Date: 4/24/2025  CONCLUSION:  The NG tube tip is pointing to the left in the mid body of the stomach in good position. There is gaseous distension of the stomach.  There is some distension of the esophagus also noted.  The heart size and vascularity are normal.  The lungs are hyperinflated without definite pneumonia or mass.   LOCATION:  [place] [id number]      Dictated by (CST): Peter Reynoso MD on 4/24/2025 at 7:04 AM     Finalized by (CST): Peter Reynoso MD on 4/24/2025 at 7:05 AM       CT ABDOMEN+PELVIS(CPT=74176)  Result Date: 4/24/2025  CONCLUSION:  Postsurgical changes of the bowel are noted.  There are numerous dilated loops of small bowel present suggestive of small-bowel obstruction.  A discrete transition point is not clearly identified although evaluation is limited without intravenous or oral contrast.  There is no free fluid seen and there is small bowel feces sign with debris which is suggestive of delayed transit.  Pneumatosis is difficult to entirely exclude on this exam.  Close clinical follow-up and correlation with lactic acid is suggested.  There is at least moderate left hydroureteronephrosis is present which is new.  There is a presumed ileal conduit and this may also be related to bowel obstruction.  Critical results were discussed with Dr. Panda by Dr. Montalvo at approximately 0008 on 4/24/25.  Read back was performed.    LOCATION:  [place]   Dictated by (CST): Valdemar Montalvo MD on 4/24/2025 at 0:02 AM     Finalized by (CST): Valdemar Montalvo MD on 4/24/2025  at 0:09 AM           Medical Decision Making         Impression:  Problem List[7]    Small bowel obstruction    The patient was seen and discussed with Dr. Sagastume and Dr. Landin.  The patient was most recently seen in clinic with Dr. Landin and would like to continue seeing him for continuity of care.  Continue NG tube to low intermittent suction.  Continue NPO.  Continue bowel rest and IV fluids.  Continue pain control and antiemetics as needed.  Encourage ambulation and up to chair.  DVT prophylaxis with heparin.  GI prophylaxis with Protonix.    GARY Pbalo  4/24/2025  8:32 AM    Is this a shared or split note between Advanced Practice Provider and Physician? Yes      The patient was independently examined.  I agree with the PAs assessment and plan.      This is a 75-year-old woman with small bowel obstruction    CT images were reviewed personally by me  CT shows distended loops of small bowel with fecalization  There is no clear transition point  On physical exam, patient is soft and nontender without any appreciated distention  NG tube was placed with 450 cc out  Patient denies any abdominal pain, nausea, or vomiting  She continues to have decreased output from her ostomy    No acute surgical intervention  Recommend continued conservative management with NG tube decompression and n.p.o. for bowel rest  IV fluids for hydration  Encourage ambulation and mobilization  Surgery will continue to follow  Rest of care per primary    More than 50% of clinical time and 100% MDM was performed by me    Thank you for letting me participate in the care of this patient      Najma Sagastume MD  [facility] General Surgery  4/24/2025  6:28 PM           [1]   Past Medical History:   Cancer (HCC)    cervicle    CKD (chronic kidney disease)   [2]   Past Surgical History:  Procedure Laterality Date    Other surgical history      BLAADER REMOVAL    Part removal colon w end colostomy     [3]   Family History  Problem Relation Age  of Onset    Stroke Father     Stroke Mother    [4]   Allergies  Allergen Reactions    Famotidine NAUSEA AND VOMITING   [5]   Medications Prior to Admission   Medication Sig    metoprolol succinate ER 25 MG Oral Tablet 24 Hr Take 1 tablet (25 mg total) by mouth in the morning.    Zolpidem Tartrate ER 6.25 MG Oral Tab CR Take 1 tablet (6.25 mg total) by mouth nightly as needed for Sleep.    TRAZODONE 50 MG Oral Tab Take 1 tablet (50 mg total) by mouth nightly.    rosuvastatin 5 MG Oral Tab Take 1 tablet (5 mg total) by mouth in the morning.    Ferrous Sulfate 325 (65 Fe) MG Oral Tab Take 1 tablet (325 mg total) by mouth daily with breakfast.    Ascorbic Acid (VITAMIN C OR) Take by mouth in the morning.    Coenzyme Q10 100 MG Oral Cap Take 300 mg by mouth in the morning.    omega-3 fatty acids 1000 MG Oral Cap Take 1,000 mg by mouth in the morning.    sodium bicarbonate 650 MG Oral Tab Take 1 tablet (650 mg total) by mouth in the morning and 1 tablet (650 mg total) before bedtime.    loperamide 2 MG Oral Cap Take 1 capsule (2 mg total) by mouth as needed.   [6]   Current Facility-Administered Medications:     melatonin tab 3 mg, 3 mg, Oral, Nightly PRN    ondansetron (Zofran) 4 MG/2ML injection 4 mg, 4 mg, Intravenous, Q6H PRN    metoclopramide (Reglan) 5 mg/mL injection 5 mg, 5 mg, Intravenous, Q8H PRN    sodium chloride 0.9% infusion, , Intravenous, Continuous    heparin (Porcine) 5000 UNIT/ML injection 5,000 Units, 5,000 Units, Subcutaneous, Q8H JENNYFER    morphINE PF 4 MG/ML injection 1 mg, 1 mg, Intravenous, Q2H PRN **OR** morphINE PF 4 MG/ML injection 2 mg, 2 mg, Intravenous, Q2H PRN **OR** morphINE PF 4 MG/ML injection 4 mg, 4 mg, Intravenous, Q2H PRN    pantoprazole (Protonix) 40 mg in sodium chloride 0.9% PF 10 mL IV push, 40 mg, Intravenous, Q24H    [COMPLETED] sodium chloride 0.9 % IV bolus 500 mL, 500 mL, Intravenous, Once **FOLLOWED BY** sodium chloride 0.9% infusion, , Intravenous, Continuous  [7]   Patient  Active Problem List  Diagnosis    Hydronephrosis, right    Colostomy in place (HCC)    Stage 3a chronic kidney disease (HCC)    Elevated lipoprotein A level    Hyperlipidemia    Elevated blood pressure reading    Insomnia    Frequent diarrhea    Heart murmur    Thrombocytopenia    SBO (small bowel obstruction) (HCC)    Primary hypertension    Iron deficiency anemia secondary to inadequate dietary iron intake

## 2025-04-25 LAB
ANION GAP SERPL CALC-SCNC: 17 MMOL/L (ref 0–18)
BASOPHILS # BLD AUTO: 0.01 X10(3) UL (ref 0–0.2)
BASOPHILS NFR BLD AUTO: 0.3 %
BUN BLD-MCNC: 17 MG/DL (ref 9–23)
CALCIUM BLD-MCNC: 8.6 MG/DL (ref 8.7–10.6)
CHLORIDE SERPL-SCNC: 114 MMOL/L (ref 98–112)
CO2 SERPL-SCNC: 18 MMOL/L (ref 21–32)
CREAT BLD-MCNC: 1.34 MG/DL (ref 0.55–1.02)
EGFRCR SERPLBLD CKD-EPI 2021: 42 ML/MIN/1.73M2 (ref 60–?)
EOSINOPHIL # BLD AUTO: 0.03 X10(3) UL (ref 0–0.7)
EOSINOPHIL NFR BLD AUTO: 0.8 %
ERYTHROCYTE [DISTWIDTH] IN BLOOD BY AUTOMATED COUNT: 13.9 %
GLUCOSE BLD-MCNC: 123 MG/DL (ref 70–99)
GLUCOSE BLD-MCNC: 158 MG/DL (ref 70–99)
GLUCOSE BLD-MCNC: 215 MG/DL (ref 70–99)
GLUCOSE BLD-MCNC: 63 MG/DL (ref 70–99)
GLUCOSE BLD-MCNC: 69 MG/DL (ref 70–99)
HCT VFR BLD AUTO: 30.8 % (ref 35–48)
HGB BLD-MCNC: 10 G/DL (ref 12–16)
IMM GRANULOCYTES # BLD AUTO: 0.02 X10(3) UL (ref 0–1)
IMM GRANULOCYTES NFR BLD: 0.5 %
LYMPHOCYTES # BLD AUTO: 0.77 X10(3) UL (ref 1–4)
LYMPHOCYTES NFR BLD AUTO: 19.4 %
MCH RBC QN AUTO: 32.1 PG (ref 26–34)
MCHC RBC AUTO-ENTMCNC: 32.5 G/DL (ref 31–37)
MCV RBC AUTO: 98.7 FL (ref 80–100)
MONOCYTES # BLD AUTO: 0.27 X10(3) UL (ref 0.1–1)
MONOCYTES NFR BLD AUTO: 6.8 %
NEUTROPHILS # BLD AUTO: 2.86 X10 (3) UL (ref 1.5–7.7)
NEUTROPHILS # BLD AUTO: 2.86 X10(3) UL (ref 1.5–7.7)
NEUTROPHILS NFR BLD AUTO: 72.2 %
OSMOLALITY SERPL CALC.SUM OF ELEC: 308 MOSM/KG (ref 275–295)
PLATELET # BLD AUTO: 140 10(3)UL (ref 150–450)
POTASSIUM SERPL-SCNC: 3.4 MMOL/L (ref 3.5–5.1)
RBC # BLD AUTO: 3.12 X10(6)UL (ref 3.8–5.3)
SODIUM SERPL-SCNC: 149 MMOL/L (ref 136–145)
WBC # BLD AUTO: 4 X10(3) UL (ref 4–11)

## 2025-04-25 PROCEDURE — 99232 SBSQ HOSP IP/OBS MODERATE 35: CPT | Performed by: STUDENT IN AN ORGANIZED HEALTH CARE EDUCATION/TRAINING PROGRAM

## 2025-04-25 RX ORDER — NICOTINE POLACRILEX 4 MG
30 LOZENGE BUCCAL
Status: DISCONTINUED | OUTPATIENT
Start: 2025-04-25 | End: 2025-04-27

## 2025-04-25 RX ORDER — DEXTROSE MONOHYDRATE AND SODIUM CHLORIDE 5; .9 G/100ML; G/100ML
INJECTION, SOLUTION INTRAVENOUS CONTINUOUS
Status: DISCONTINUED | OUTPATIENT
Start: 2025-04-25 | End: 2025-04-26

## 2025-04-25 RX ORDER — DEXTROSE MONOHYDRATE 25 G/50ML
50 INJECTION, SOLUTION INTRAVENOUS
Status: DISCONTINUED | OUTPATIENT
Start: 2025-04-25 | End: 2025-04-27

## 2025-04-25 RX ORDER — DEXTROSE MONOHYDRATE 25 G/50ML
INJECTION, SOLUTION INTRAVENOUS
Status: COMPLETED
Start: 2025-04-25 | End: 2025-04-25

## 2025-04-25 RX ORDER — NICOTINE POLACRILEX 4 MG
15 LOZENGE BUCCAL
Status: DISCONTINUED | OUTPATIENT
Start: 2025-04-25 | End: 2025-04-27

## 2025-04-25 NOTE — PLAN OF CARE
Patient AAOx4, mandarin speaking,  utilized. Room air, saturating WNL. Reported mild RLQ abdominal pain improved with PRN pain mediation.PRN zofran per MAR for mild nausea. NG to LIS for SBO, output per flowsheets. Seen by surgery team. IVF infusing, IV PPI. NPO. Standby assist. LUQ ostomy, right nephrostomy. Safety precautions in place. Patient and daughter at bedside updated on PO, questions answered, understanding verbalized.     Problem: Patient/Family Goals  Goal: Patient/Family Long Term Goal  Description: Patient's Long Term Goal: discharge with appropriate resources  Interventions:  -surgery consult  -bowel rest, NG to LIS  -IV fluids  -pain, nausea management  - See additional Care Plan goals for specific interventions  Outcome: Progressing  Goal: Patient/Family Short Term Goal  Description: Patient's Short Term Goal: 4/24: adequate pain control  Interventions:   - PRN pain medication  -NG tube to LIS  -surgery consult  - See additional Care Plan goals for specific interventions  Outcome: Progressing     Problem: GASTROINTESTINAL - ADULT  Goal: Minimal or absence of nausea and vomiting  Description: INTERVENTIONS:- Maintain adequate hydration with IV or PO as ordered and tolerated- Nasogastric tube to low intermittent suction as ordered- Evaluate effectiveness of ordered antiemetic medications- Provide nonpharmacologic comfort measures as appropriate- Advance diet as tolerated, if ordered- Obtain nutritional consult as needed- Evaluate fluid balance  Outcome: Progressing  Goal: Maintains or returns to baseline bowel function  Description: INTERVENTIONS:- Assess bowel function- Maintain adequate hydration with IV or PO as ordered and tolerated- Evaluate effectiveness of GI medications- Encourage mobilization and activity- Obtain nutritional consult as needed- Establish a toileting routine/schedule- Consider collaborating with pharmacy to review patient's medication profile  Outcome: Progressing      Problem: PAIN - ADULT  Goal: Verbalizes/displays adequate comfort level or patient's stated pain goal  Description: INTERVENTIONS:- Encourage pt to monitor pain and request assistance- Assess pain using appropriate pain scale- Administer analgesics based on type and severity of pain and evaluate response- Implement non-pharmacological measures as appropriate and evaluate response- Consider cultural and social influences on pain and pain management- Manage/alleviate anxiety- Utilize distraction and/or relaxation techniques- Monitor for opioid side effects- Notify MD/LIP if interventions unsuccessful or patient reports new pain- Anticipate increased pain with activity and pre-medicate as appropriate  Outcome: Progressing

## 2025-04-25 NOTE — PLAN OF CARE
Assumed care of patient this am.   NG in place, greenish output in canister. Mandarin speaking,  used.  Patient had an episode of hypoglycemia, md notified.  Orders placed.  Patient reports no stomach pain today or nausea, just c/o throat pain from tube.  Daughter updated.  Patient remains NPO.    Problem: GASTROINTESTINAL - ADULT  Goal: Minimal or absence of nausea and vomiting  Description: INTERVENTIONS:- Maintain adequate hydration with IV or PO as ordered and tolerated- Nasogastric tube to low intermittent suction as ordered- Evaluate effectiveness of ordered antiemetic medications- Provide nonpharmacologic comfort measures as appropriate- Advance diet as tolerated, if ordered- Obtain nutritional consult as needed- Evaluate fluid balance  Outcome: Progressing  Goal: Maintains or returns to baseline bowel function  Description: INTERVENTIONS:- Assess bowel function- Maintain adequate hydration with IV or PO as ordered and tolerated- Evaluate effectiveness of GI medications- Encourage mobilization and activity- Obtain nutritional consult as needed- Establish a toileting routine/schedule- Consider collaborating with pharmacy to review patient's medication profile  Outcome: Progressing     Problem: PAIN - ADULT  Goal: Verbalizes/displays adequate comfort level or patient's stated pain goal  Description: INTERVENTIONS:- Encourage pt to monitor pain and request assistance- Assess pain using appropriate pain scale- Administer analgesics based on type and severity of pain and evaluate response- Implement non-pharmacological measures as appropriate and evaluate response- Consider cultural and social influences on pain and pain management- Manage/alleviate anxiety- Utilize distraction and/or relaxation techniques- Monitor for opioid side effects- Notify MD/LIP if interventions unsuccessful or patient reports new pain- Anticipate increased pain with activity and pre-medicate as appropriate  Outcome:  Progressing

## 2025-04-25 NOTE — PLAN OF CARE
Assumed care of patient @2330.  NG in place, greenish output in canister.  Mandarin speaking, need .

## 2025-04-25 NOTE — PROGRESS NOTES
[facility]   part of [facility]     Hospitalist Progress Note     Anmol Gonzalez Patient Status:  Inpatient    2/15/1950 MRN [id number]   Location [facility] 0SW-A Attending Racquel Martinez,    Hosp Day # 1 PCP Jess Esquivel MD     Chief Complaint: Abdominal pain    Subjective:     Patient resting in bed and denies abdominal pain    Objective:    Review of Systems:   A comprehensive review of systems was completed; pertinent positive and negatives stated in subjective.    Vital signs:  Temp:  [98 °F (36.7 °C)-98.7 °F (37.1 °C)] 98 °F (36.7 °C)  Pulse:  [61-70] 69  Resp:  [16-18] 18  BP: (132-155)/(60-94) 153/67  SpO2:  [94 %-97 %] 96 %    Physical Exam:    General: No acute distress  HEENT: NGT in place   Respiratory: No wheezes, no rhonchi  Cardiovascular: S1, S2, regular rate and rhythm  Abdomen: Soft, Non-tender, non-distended, positive bowel sounds. Ostomy in place  Neuro: No new focal deficits.   Extremities: No edema    Diagnostic Data:    Labs:  Recent Labs   Lab 25  0742   WBC 7.4 4.0   HGB 12.9 10.0*   MCV 92.5 98.7   .0 140.0*       Recent Labs   Lab 25  0742   * 69*   BUN 27* 17   CREATSERUM 1.58* 1.34*   CA 10.7* 8.6*   ALB 5.0*  --     149*   K 4.1 3.4*    114*   CO2 22.0 18.0*   ALKPHO 59  --    AST 76*  --    *  --    BILT 0.9  --    TP 8.2  --        Estimated Glomerular Filtration Rate: 42 mL/min/1.73m2 (A) (result from lab).    No results for input(s): \"TROP\", \"TROPHS\", \"CK\" in the last 168 hours.    No results for input(s): \"PTP\", \"INR\" in the last 168 hours.               Microbiology    No results found for this visit on 25.      Imaging: Reviewed in Epic.    Medications: Scheduled Medications[1]    Assessment & Plan:      #SBO  - NG tube to LIS  - N.p.o.  - Pain meds and antiemetics as needed  - Surgery following    #Hyperlipidemia  - Statin on hold    #Hypertension  - Metoprolol on hold      Racquel  DO Juan    Supplementary Documentation:     Quality:  DVT Mechanical Prophylaxis:   SCDs,    DVT Pharmacologic Prophylaxis   Medication    heparin (Porcine) 5000 UNIT/ML injection 5,000 Units                Code Status: Not on file  Pratt: No urinary catheter in place  Pratt Duration (in days):   Central line:    SHELLEY:     Discharge is dependent on: Clinical improvement   At this point Ms. Gonzalez is expected to be discharge to: Home    The 21st Century Cures Act makes medical notes like these available to patients in the interest of transparency. Please be advised this is a medical document. Medical documents are intended to carry relevant information, facts as evident, and the clinical opinion of the practitioner. The medical note is intended as peer to peer communication and may appear blunt or direct. It is written in medical language and may contain abbreviations or verbiage that are unfamiliar.              **Certification      PHYSICIAN Certification of Need for Inpatient Hospitalization - Initial Certification    Patient will require inpatient services that will reasonably be expected to span two midnight's based on the clinical documentation in H+P.   Based on patients current state of illness, I anticipate that, after discharge, patient will require TBD.           [1]    potassium chloride  40 mEq Intravenous Once    heparin  5,000 Units Subcutaneous Q8H JENNYFER    pantoprazole  40 mg Intravenous Q24H

## 2025-04-25 NOTE — PROGRESS NOTES
[facility]  Progress Note    Anmol Gonzalez Patient Status:  Inpatient    2/15/1950 MRN [id number]   Location [facility] 0SW-A Attending Racquel Martinez, DO   Hosp Day # 1 PCP Jess Esquivel MD     Subjective:  She is seen and examined resting in bed with daughter at bedside who aids in translation. She denies abdominal pain. She reports a small amount of flatus in her ostomy appliance last evening. She denies stool output. Last stool output on . NG tube in place with 400 mL of output in last 24 hours.     Objective/Physical Exam:  /67 (BP Location: Left arm)   Pulse 69   Temp 98 °F (36.7 °C) (Oral)   Resp 18   Wt 98 lb 6.4 oz (44.6 kg)   SpO2 96%   BMI 18.44 kg/m²     Intake/Output Summary (Last 24 hours) at 2025 0946  Last data filed at 2025 0500  Gross per 24 hour   Intake --   Output 1325 ml   Net -1325 ml         General: Awake, Alert, Cooperative.  No apparent distress.  HEENT: Normocephalic, atraumatic. NG tube in place with dark bilious output  Pulm: no respiratory distress, no increased work of breathing  Abdomen:  Soft, non-distended,non-tender to palpation, with no rebound or guarding.  No peritoneal signs. Ostomy in place to LUQ with old stool in appliance. No gas in appliance.     Labs:  Lab Results   Component Value Date    WBC 4.0 2025    HGB 10.0 2025    HCT 30.8 2025    .0 2025      Lab Results   Component Value Date    INR 1.0 2025    INR 1.0 2024    INR 1.0 2024     Lab Results   Component Value Date     2025    K 3.4 2025     2025    CO2 18.0 2025    BUN 17 2025    CREATSERUM 1.34 2025    GLU 69 2025    CA 8.6 2025            Assessment:  Problem List[1]    Small bowel obstruction    Plan:  Continue NG tube to low intermittent suction. Await for further return of bowel function prior to diet advancement  Continue NPO  Analgesics and antiemetics as  needed  Encourage ambulation and up to chair  Encourage incentive spirometer   DVT prophylaxis with heparin  GI prophylaxis with protonix    The patient was seen and examined with Dr. Landin.     Xiomara Chavez PA-C  4/25/2025  9:46 AM    This note was initiated by Xiomara Chavez PA-C.  The PA saw the patient in conjunction with me. The PA performed a history, exam, and developed the assessment and plan. I agree with the mentioned assessment and plan and have provided further documentation of my own, if necessary.    This is a very nice 75 year old female with a very complicated surgical history.  She was recently seen by my partner, Dr. Sagastume, when she was hospitalized from 12/1/2024 - 12/6/2024 with a small bowel obstruction that resolved with nonoperative management.  Patient is accompanied by her daughter today.  Patient lives with her daughter.  Patient was offered an  but preferred to use her daughter for interpretation.  I established care with the patient in my clinic on 12/19/2024.  Patient and family requested my involvement in her surgical care during this hospitalization..     Patient has a significant abdominal surgical history including hysterectomy for cervical cancer followed by chemoradiation in 2006. She then had another episode of bowel obstruction requiring exploratory laparotomy which was complicated by an enterotomy with a leak needing multiple takeback's for abdominal washouts.  Patient was given an ileostomy during this time, this was done in 2019. 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 Patient then underwent pelvic exenteration with bowel resection and muscle flap reconstruction in 2022.  Patient currently has an end ostomy and nephrostomy tube in place.    Patient began having abdominal pain, nausea, vomiting and constipation on 4/22/2025.  Patient presented to the [place] emergency department on 4/23/2025.  Her CT  imaging shows evidence of a small bowel obstruction.  Patient had an NG tube placed and has been admitted for ongoing nonoperative treatment.    As of this morning, patient has been passing some flatus but no stool.  Her NG tube output has been around 400 cc over the last 24 hours and appears bilious in character.  She denies any abdominal pain.    Patient has been afebrile and hemodynamically normal.  Labs including WBC and lactate are normal.  Patient is comfortable appearing.  She has an NG tube in place draining a small amount of bilious fluid.  Her abdomen is soft, nondistended and nontender to palpation.  There is a left-sided ostomy which is pink without any new stool or flatus production.    Given the patient's complex abdominal surgical history, I recommend a prolonged trial of nonoperative management for the current small bowel obstruction.  Patient and daughter are agreeable to this plan.  I recommend keeping the NG tube in place to low intermittent suction, n.p.o. except small sips of water and ice chips and continuing maintenance IV fluids.  If patient has obvious clinical return of bowel function, will attempt to remove NG tube and advance diet slowly.  If no obvious clinical return of bowel function over the weekend, we will consider small bowel follow-through x-rays study to see if contrast passes through the ostomy.  Surgical intervention will be reserved only if the patient decompensates and develops clinical concern for bowel ischemia.    Patient and daughter are agreeable to very conservative approach.  Surgery will continue to follow, please contact with any further questions or concerns.    Jordan Landin MD  [facility]            [1]   Patient Active Problem List  Diagnosis    Hydronephrosis, right    Colostomy in place (HCC)    Stage 3a chronic kidney disease (HCC)    Elevated lipoprotein A level    Hyperlipidemia    Elevated blood pressure reading    Insomnia    Frequent diarrhea     Heart murmur    Thrombocytopenia    SBO (small bowel obstruction) (HCC)    Primary hypertension    Iron deficiency anemia secondary to inadequate dietary iron intake

## 2025-04-26 PROBLEM — E87.0 HYPERNATREMIA: Status: ACTIVE | Noted: 2025-04-26

## 2025-04-26 LAB
ANION GAP SERPL CALC-SCNC: 9 MMOL/L (ref 0–18)
BUN BLD-MCNC: 14 MG/DL (ref 9–23)
CALCIUM BLD-MCNC: 9.1 MG/DL (ref 8.7–10.6)
CHLORIDE SERPL-SCNC: 117 MMOL/L (ref 98–112)
CO2 SERPL-SCNC: 23 MMOL/L (ref 21–32)
CREAT BLD-MCNC: 1.34 MG/DL (ref 0.55–1.02)
DEPRECATED HBV CORE AB SER IA-ACNC: 213 NG/ML (ref 50–306)
EGFRCR SERPLBLD CKD-EPI 2021: 42 ML/MIN/1.73M2 (ref 60–?)
ERYTHROCYTE [DISTWIDTH] IN BLOOD BY AUTOMATED COUNT: 13.9 %
FOLATE SERPL-MCNC: 22 NG/ML (ref 5.4–?)
GLUCOSE BLD-MCNC: 127 MG/DL (ref 70–99)
GLUCOSE BLD-MCNC: 133 MG/DL (ref 70–99)
GLUCOSE BLD-MCNC: 138 MG/DL (ref 70–99)
GLUCOSE BLD-MCNC: 146 MG/DL (ref 70–99)
HCT VFR BLD AUTO: 29.7 % (ref 35–48)
HGB BLD-MCNC: 9.7 G/DL (ref 12–16)
HGB RETIC QN AUTO: 34 PG (ref 28.2–36.6)
IMM RETICS NFR: 0.17 RATIO (ref 0.1–0.3)
IRON SATN MFR SERPL: 24 % (ref 15–50)
IRON SERPL-MCNC: 63 UG/DL (ref 50–170)
LDH SERPL L TO P-CCNC: 152 U/L (ref 120–246)
MCH RBC QN AUTO: 31.8 PG (ref 26–34)
MCHC RBC AUTO-ENTMCNC: 32.7 G/DL (ref 31–37)
MCV RBC AUTO: 97.4 FL (ref 80–100)
OSMOLALITY SERPL CALC.SUM OF ELEC: 311 MOSM/KG (ref 275–295)
PLATELET # BLD AUTO: 139 10(3)UL (ref 150–450)
POTASSIUM SERPL-SCNC: 3.4 MMOL/L (ref 3.5–5.1)
POTASSIUM SERPL-SCNC: 3.4 MMOL/L (ref 3.5–5.1)
RBC # BLD AUTO: 3.05 X10(6)UL (ref 3.8–5.3)
RETICS # AUTO: 53.9 X10(3) UL (ref 22.5–147.5)
RETICS/RBC NFR AUTO: 1.8 % (ref 0.5–2.5)
SODIUM SERPL-SCNC: 149 MMOL/L (ref 136–145)
TOTAL IRON BINDING CAPACITY: 267 UG/DL (ref 250–425)
TRANSFERRIN SERPL-MCNC: 190 MG/DL (ref 250–380)
VIT B12 SERPL-MCNC: 476 PG/ML (ref 211–911)
WBC # BLD AUTO: 5 X10(3) UL (ref 4–11)

## 2025-04-26 PROCEDURE — 99232 SBSQ HOSP IP/OBS MODERATE 35: CPT | Performed by: STUDENT IN AN ORGANIZED HEALTH CARE EDUCATION/TRAINING PROGRAM

## 2025-04-26 RX ORDER — ZOLPIDEM TARTRATE 5 MG/1
5 TABLET ORAL NIGHTLY PRN
Status: DISCONTINUED | OUTPATIENT
Start: 2025-04-26 | End: 2025-04-27

## 2025-04-26 RX ORDER — DEXTROSE MONOHYDRATE AND SODIUM CHLORIDE 5; .45 G/100ML; G/100ML
INJECTION, SOLUTION INTRAVENOUS CONTINUOUS
Status: DISCONTINUED | OUTPATIENT
Start: 2025-04-26 | End: 2025-04-27

## 2025-04-26 NOTE — PLAN OF CARE
Received patient awake and alert x 4.   O2 protocol: On room air, clear lungs, sats 97%. Denies shortness of breath.   Non-Cardiac electrolyte protocol, K 3.4  SCD and  Heparin for VTE prophylaxis.  IV fluids.  Had bowel movement on her colostomy  Voiding adequately, clear yellow urine on her right nephrostomy tube.   Call light and bedside table within reach.    Up w/ 1 assist,  steady,   NPO.   Q 6 accuchecks.     Problem: Patient/Family Goals  Goal: Patient/Family Long Term Goal  Description: Patient's Long Term Goal: Discharge home when medically stable.    Interventions:  IV fluids  NPO  O2 protocol,   Fall precaution, bed alarm on, non-skid socks on,  call light and bedside table within reach.  Monitor intake and output.  Electrolyte protocol.  Heparin  and SCD for VTE       - See additional Care Plan goals for specific interventions  Outcome: Progressing  Goal: Patient/Family Short Term Goal  Description: Patient's Short Term Goal: vital signs stable this shift    Interventions:   IV fluids  NPO  O2 protocol,   Fall precaution, bed alarm on, non-skid socks on,  call light and bedside table within reach.  Monitor intake and output.  Electrolyte protocol.  Heparin  and SCD for VTE       - See additional Care Plan goals for specific interventions  Outcome: Progressing     Problem: GASTROINTESTINAL - ADULT  Goal: Minimal or absence of nausea and vomiting  Description: INTERVENTIONS:- Maintain adequate hydration with IV or PO as ordered and tolerated- Nasogastric tube to low intermittent suction as ordered- Evaluate effectiveness of ordered antiemetic medications- Provide nonpharmacologic comfort measures as appropriate- Advance diet as tolerated, if ordered- Obtain nutritional consult as needed- Evaluate fluid balance  Outcome: Progressing  Goal: Maintains or returns to baseline bowel function  Description: INTERVENTIONS:- Assess bowel function- Maintain adequate hydration with IV or PO as ordered and  tolerated- Evaluate effectiveness of GI medications- Encourage mobilization and activity- Obtain nutritional consult as needed- Establish a toileting routine/schedule- Consider collaborating with pharmacy to review patient's medication profile  Outcome: Progressing     Problem: PAIN - ADULT  Goal: Verbalizes/displays adequate comfort level or patient's stated pain goal  Description: INTERVENTIONS:- Encourage pt to monitor pain and request assistance- Assess pain using appropriate pain scale- Administer analgesics based on type and severity of pain and evaluate response- Implement non-pharmacological measures as appropriate and evaluate response- Consider cultural and social influences on pain and pain management- Manage/alleviate anxiety- Utilize distraction and/or relaxation techniques- Monitor for opioid side effects- Notify MD/LIP if interventions unsuccessful or patient reports new pain- Anticipate increased pain with activity and pre-medicate as appropriate  Outcome: Progressing

## 2025-04-26 NOTE — PLAN OF CARE
Patient A&Ox4 Chinese/Mandarin speaking   Room air  K+ replaced per electrolyte protocol   NG Tube removed   Lungs clear   Ostomy producing OP, Stool sample collected   Nephrostomy draining   Fluids changed and running at prescribed rate   No complaints of pain   Clear liquid diet, no nausea or vomiting   Call light within reach, all safety measures maintained       Problem: GASTROINTESTINAL - ADULT  Goal: Minimal or absence of nausea and vomiting  Description: INTERVENTIONS:- Maintain adequate hydration with IV or PO as ordered and tolerated- Nasogastric tube to low intermittent suction as ordered- Evaluate effectiveness of ordered antiemetic medications- Provide nonpharmacologic comfort measures as appropriate- Advance diet as tolerated, if ordered- Obtain nutritional consult as needed- Evaluate fluid balance  Outcome: Progressing  Goal: Maintains or returns to baseline bowel function  Description: INTERVENTIONS:- Assess bowel function- Maintain adequate hydration with IV or PO as ordered and tolerated- Evaluate effectiveness of GI medications- Encourage mobilization and activity- Obtain nutritional consult as needed- Establish a toileting routine/schedule- Consider collaborating with pharmacy to review patient's medication profile  Outcome: Progressing     Problem: PAIN - ADULT  Goal: Verbalizes/displays adequate comfort level or patient's stated pain goal  Description: INTERVENTIONS:- Encourage pt to monitor pain and request assistance- Assess pain using appropriate pain scale- Administer analgesics based on type and severity of pain and evaluate response- Implement non-pharmacological measures as appropriate and evaluate response- Consider cultural and social influences on pain and pain management- Manage/alleviate anxiety- Utilize distraction and/or relaxation techniques- Monitor for opioid side effects- Notify MD/LIP if interventions unsuccessful or patient reports new pain- Anticipate increased pain with  activity and pre-medicate as appropriate  Outcome: Progressing

## 2025-04-26 NOTE — PROGRESS NOTES
[facility]  Progress Note    Anmol Gonzalez Patient Status:  Inpatient    2/15/1950 MRN [id number]   Location [facility] 0SW-A Attending Racquel Martinez, DO   Hosp Day # 2 PCP Jess Esquivel MD     Subjective:  The patient was seen and examined at bedside. She is Mandarin speaking. Her daughter requested she be called to be updated on the plan of care and help translate. She feels well this morning. She is passing stool and flatus via her ostomy appliance.    Objective/Physical Exam:  /65 (BP Location: Left arm)   Pulse (!) 49   Temp 97.7 °F (36.5 °C) (Oral)   Resp 16   Wt 98 lb 6.4 oz (44.6 kg)   SpO2 99%   BMI 18.44 kg/m²     Intake/Output Summary (Last 24 hours) at 2025 1223  Last data filed at 2025 0600  Gross per 24 hour   Intake 1095 ml   Output 1110 ml   Net -15 ml         General: Alert, oriented x3. No acute distress.  HEENT: Normocephalic, atraumatic. No scleral icterus.  Pulmonary: No respiratory distress, effort normal.   Abdomen: Non-distended, without tympany to percussion. Soft, non-tender to palpation. No rebound or guarding. No peritoneal signs. Left lower quadrant stoma is pink, viable and functioning.  Incision: Extremities: No lower extremity edema. No clubbing or cyanosis.   Skin: Warm, dry. No jaundice.       Labs:  Lab Results   Component Value Date    WBC 5.0 2025    HGB 9.7 2025    HCT 29.7 2025    .0 2025      Lab Results   Component Value Date    INR 1.0 2025    INR 1.0 2024    INR 1.0 2024     Lab Results   Component Value Date     2025    K 3.4 2025    K 3.4 2025     2025    CO2 23.0 2025    BUN 14 2025    CREATSERUM 1.34 2025     2025    CA 9.1 2025          Assessment:  Problem List[1]    SBO    Plan:  No plan for acute general surgical intervention at this time  Remove NG tube  Start on a clear liquid diet  Antiemetics and analgesics as  needed  Encourage ambulation and up to chair  Medical management per primary  DVT prophylaxis with heparin  GI prophylaxis with protonix       The patient was discussed with Dr. Landin , and he is in agreement with the assessment and plan. My total face time with this patient was 25 minutes. Greater than half of the visit was spent in counseling the patient on the above listed diagnoses and treatment options.     Eda Ruano PA-C  4/26/2025  12:23 PM    This note was initiated by Eda Ruano PA-C.  The PA saw the patient in conjunction with me. The PA performed a history, exam, and developed the assessment and plan. I agree with the mentioned assessment and plan and have provided further documentation of my own, if necessary.    Patient is having stool and flatus output from her ostomy.  NG tube output is minimal with 100 cc reported in the last 24 hours.  Patient denies any abdominal pain.  Patient is afebrile and hemodynamically normal.  Her abdomen is soft, nondistended and nontender to palpation.  There is a well-healed midline scar and a left-sided colostomy productive of semiformed brown stool.    I recommend removing NG tube today and starting clear liquids.  If patient tolerates clear liquids without any worsening abdominal pain, nausea, vomiting or bloating, will try solid diet tomorrow.  Surgery will continue to follow, please contact with any further questions or concerns.    Jordan Landin MD  [facility]           [1]   Patient Active Problem List  Diagnosis    Hydronephrosis, right    Colostomy in place (HCC)    Stage 3a chronic kidney disease (HCC)    Elevated lipoprotein A level    Hyperlipidemia    Elevated blood pressure reading    Insomnia    Frequent diarrhea    Heart murmur    Thrombocytopenia    SBO (small bowel obstruction) (HCC)    Primary hypertension    Iron deficiency anemia secondary to inadequate dietary iron intake    Hypernatremia

## 2025-04-27 VITALS
TEMPERATURE: 98 F | DIASTOLIC BLOOD PRESSURE: 64 MMHG | BODY MASS INDEX: 18 KG/M2 | WEIGHT: 98.38 LBS | OXYGEN SATURATION: 98 % | SYSTOLIC BLOOD PRESSURE: 144 MMHG | HEART RATE: 57 BPM | RESPIRATION RATE: 19 BRPM

## 2025-04-27 LAB
ANION GAP SERPL CALC-SCNC: 8 MMOL/L (ref 0–18)
BUN BLD-MCNC: 10 MG/DL (ref 9–23)
CALCIUM BLD-MCNC: 8.7 MG/DL (ref 8.7–10.6)
CHLORIDE SERPL-SCNC: 112 MMOL/L (ref 98–112)
CO2 SERPL-SCNC: 24 MMOL/L (ref 21–32)
CREAT BLD-MCNC: 1.25 MG/DL (ref 0.55–1.02)
EGFRCR SERPLBLD CKD-EPI 2021: 46 ML/MIN/1.73M2 (ref 60–?)
GLUCOSE BLD-MCNC: 101 MG/DL (ref 70–99)
GLUCOSE BLD-MCNC: 92 MG/DL (ref 70–99)
GLUCOSE BLD-MCNC: 97 MG/DL (ref 70–99)
OSMOLALITY SERPL CALC.SUM OF ELEC: 297 MOSM/KG (ref 275–295)
POTASSIUM SERPL-SCNC: 3.2 MMOL/L (ref 3.5–5.1)
POTASSIUM SERPL-SCNC: 3.2 MMOL/L (ref 3.5–5.1)
SODIUM SERPL-SCNC: 144 MMOL/L (ref 136–145)

## 2025-04-27 PROCEDURE — 99239 HOSP IP/OBS DSCHRG MGMT >30: CPT | Performed by: STUDENT IN AN ORGANIZED HEALTH CARE EDUCATION/TRAINING PROGRAM

## 2025-04-27 PROCEDURE — 99232 SBSQ HOSP IP/OBS MODERATE 35: CPT | Performed by: STUDENT IN AN ORGANIZED HEALTH CARE EDUCATION/TRAINING PROGRAM

## 2025-04-27 RX ORDER — ROSUVASTATIN CALCIUM 5 MG/1
5 TABLET, COATED ORAL DAILY
Status: DISCONTINUED | OUTPATIENT
Start: 2025-04-27 | End: 2025-04-27

## 2025-04-27 RX ORDER — METOPROLOL SUCCINATE 25 MG/1
25 TABLET, EXTENDED RELEASE ORAL
Status: DISCONTINUED | OUTPATIENT
Start: 2025-04-27 | End: 2025-04-27

## 2025-04-27 RX ORDER — POTASSIUM CHLORIDE 1500 MG/1
40 TABLET, EXTENDED RELEASE ORAL ONCE
Status: COMPLETED | OUTPATIENT
Start: 2025-04-27 | End: 2025-04-27

## 2025-04-27 NOTE — DISCHARGE SUMMARY
[facility]     Anmol Gonzalez Patient Status:  Inpatient    2/15/1950 MRN [id number]   Location [facility] 0SW-A Attending No att. providers found   Hosp Day # 3 PCP Jess Esquivel MD     Date of Admission: 2025  Date of Discharge:  2025     Discharge Disposition: Home or Self Care    Discharge Diagnosis:  #SBO  - Advance to soft diet today  - Pain meds and antiemetics as needed  - Surgery following     #Hypernatremia, resolved  - IV fluids discontinued     #Normocytic anemia  - Occult stool negative  - Anemia panel normal     #Hyperlipidemia  - Statin      #Hypertension  - Metoprolol     History of Present Illness: Anmol Gonzalez is a 75 year old female with history of hypertension, hyperlipidemia, iron deficiency anemia, surgery of bowel obstructions in the past presents with abdominal pain and vomiting.  Started yesterday with some lower abdominal discomfort with vomiting.  Patient does have an ostomy and has had no increase in output from the ostomy.  No fevers, chills, diarrhea.  No chest pain, shortness of breath, palpitations.  No hematochezia, melena, hematuria.        Brief Synopsis: Surgery was consulted.  NG tube was placed.  Patient remained NPO.  She was given IV fluids.  NG tube was removed and patient tolerated diet.  Patient discharged home with outpatient follow-up.    Lace+ Score: 60  59-90 High Risk  29-58 Medium Risk  0-28   Low Risk       TCM Follow-Up Recommendation:  LACE > 58: High Risk of readmission after discharge from the hospital.      Procedures during hospitalization:   None    Incidental or significant findings and recommendations (brief descriptions):  See brief synopsis above    Lab/Test results pending at Discharge:   None    Consultants:  Surgery    Discharge Medication List:     Discharge Medications        CONTINUE taking these medications        Instructions Prescription details   Coenzyme Q10 100 MG Caps      Take 300 mg by mouth in the  morning.   Refills: 0     Ferrous Sulfate 325 (65 Fe) MG Tabs      Take 1 tablet (325 mg total) by mouth daily with breakfast.   Quantity: 90 tablet  Refills: 0     loperamide 2 MG Caps  Commonly known as: Imodium      Take 1 capsule (2 mg total) by mouth as needed.   Refills: 0     metoprolol succinate ER 25 MG Tb24  Commonly known as: Toprol XL      Take 1 tablet (25 mg total) by mouth in the morning.   Refills: 0     omega-3 fatty acids 1000 MG Caps  Commonly known as: Fish Oil      Take 1,000 mg by mouth in the morning.   Refills: 0     rosuvastatin 5 MG Tabs  Commonly known as: Crestor      Take 1 tablet (5 mg total) by mouth in the morning.   Refills: 0     sodium bicarbonate 650 MG Tabs      Take 1 tablet (650 mg total) by mouth in the morning and 1 tablet (650 mg total) before bedtime.   Refills: 0     traZODone 50 MG Tabs  Commonly known as: Desyrel      Take 1 tablet (50 mg total) by mouth nightly.   Quantity: 90 tablet  Refills: 0     VITAMIN C OR      Take by mouth in the morning.   Refills: 0     Zolpidem Tartrate ER 6.25 MG Tbcr  Commonly known as: AMBIEN CR      Take 1 tablet (6.25 mg total) by mouth nightly as needed for Sleep.   Quantity: 90 tablet  Refills: 0              [place]    Follow-up appointment:   Jordan Landin MD   [facility] [address] [phone]    Follow up  As needed    Jess Esquivel MD  [address]  [facility] [address] [phone]    Follow up in 1 week(s)      Appointments for Next 30 Days 2025 - 2025      None            Vital signs:       Physical Exam:    General: No acute distress   Lungs: clear to auscultation  Cardiovascular: S1, S2  Abdomen: Soft      -----------------------------------------------------------------------------------------------  PATIENT DISCHARGE INSTRUCTIONS: See electronic chart    Racquel Martinez DO    Total time spent on discharge plannin minutes     The 21st Century Cures Act  makes medical notes like these available to patients in the interest of transparency. Please be advised this is a medical document. Medical documents are intended to carry relevant information, facts as evident, and the clinical opinion of the practitioner. The medical note is intended as peer to peer communication and may appear blunt or direct. It is written in medical language and may contain abbreviations or verbiage that are unfamiliar.

## 2025-04-27 NOTE — PROGRESS NOTES
[facility]   part of [facility]     Hospitalist Progress Note     Anmol Gonzalez Patient Status:  Inpatient    2/15/1950 MRN [id number]   Location [facility] 0SW-A Attending Racquel Martinez,    Hosp Day # 3 PCP Jess Esquivel MD     Chief Complaint: Abdominal pain    Subjective:     Patient walking the halls and reports feeling well.  Patient examined in room and denies any abdominal pain, nausea, vomiting.  She is tolerating liquid diet well    Objective:    Review of Systems:   A comprehensive review of systems was completed; pertinent positive and negatives stated in subjective.    Vital signs:  Temp:  [97.5 °F (36.4 °C)-99 °F (37.2 °C)] 97.5 °F (36.4 °C)  Pulse:  [49-63] 57  Resp:  [16-20] 19  BP: (128-159)/(63-84) 144/64  SpO2:  [95 %-99 %] 98 %    Physical Exam:    General: No acute distress  Respiratory: No wheezes, no rhonchi  Cardiovascular: S1, S2, regular rate and rhythm  Abdomen: Soft, Non-tender, non-distended, positive bowel sounds. Ostomy in place  Neuro: No new focal deficits.   Extremities: No edema    Diagnostic Data:    Labs:  Recent Labs   Lab 25  2213 25  0742 25  0748   WBC 7.4 4.0 5.0   HGB 12.9 10.0* 9.7*   MCV 92.5 98.7 97.4   .0 140.0* 139.0*       Recent Labs   Lab 25  2213 25  0742 25  0748 25  0319   * 69* 138* 97   BUN 27* 17 14 10   CREATSERUM 1.58* 1.34* 1.34* 1.25*   CA 10.7* 8.6* 9.1 8.7   ALB 5.0*  --   --   --     149* 149* 144   K 4.1 3.4* 3.4*  3.4* 3.2*  3.2*    114* 117* 112   CO2 22.0 18.0* 23.0 24.0   ALKPHO 59  --   --   --    AST 76*  --   --   --    *  --   --   --    BILT 0.9  --   --   --    TP 8.2  --   --   --        Estimated Glomerular Filtration Rate: 46 mL/min/1.73m2 (A) (result from lab).    No results for input(s): \"TROP\", \"TROPHS\", \"CK\" in the last 168 hours.    No results for input(s): \"PTP\", \"INR\" in the last 168 hours.               Microbiology    No results found for  this visit on 04/23/25.      Imaging: Reviewed in Epic.    Medications: Scheduled Medications[1]    Assessment & Plan:      #SBO  - Advance to soft diet today  - Pain meds and antiemetics as needed  - Surgery following    #Hypernatremia, resolved  - IV fluids discontinued    #Normocytic anemia  - Occult stool negative  - Anemia panel normal    #Hyperlipidemia  - Statin     #Hypertension  - Metoprolol     DC planning if tolerates diet today      Racquel Martinez, DO    Supplementary Documentation:     Quality:  DVT Mechanical Prophylaxis:   SCDs,    DVT Pharmacologic Prophylaxis   Medication    heparin (Porcine) 5000 UNIT/ML injection 5,000 Units                Code Status: Not on file  Pratt: No urinary catheter in place  Pratt Duration (in days):   Central line:    SHELLEY: 4/27/2025    Discharge is dependent on: Clinical improvement   At this point Ms. Gonzalez is expected to be discharge to: Home    The 21st Century Cures Act makes medical notes like these available to patients in the interest of transparency. Please be advised this is a medical document. Medical documents are intended to carry relevant information, facts as evident, and the clinical opinion of the practitioner. The medical note is intended as peer to peer communication and may appear blunt or direct. It is written in medical language and may contain abbreviations or verbiage that are unfamiliar.              **Certification      PHYSICIAN Certification of Need for Inpatient Hospitalization - Initial Certification    Patient will require inpatient services that will reasonably be expected to span two midnight's based on the clinical documentation in H+P.   Based on patients current state of illness, I anticipate that, after discharge, patient will require TBD.           [1]    heparin  5,000 Units Subcutaneous Q8H JENNYFER    pantoprazole  40 mg Intravenous Q24H

## 2025-04-27 NOTE — PLAN OF CARE
Pt VSS, AOx4.  used for assessments, education, communications; Mandarin speaking. Pt up independent after set up; steady gait. Pt independently empties gas/stool of stoma & urine from nephrostomy; pt educated we are still measuring for evaluation; pt agreeable. Moderate amounts of stool, large amounts of gas out of ostomy bag. Moderate amounts urine out of nephrostomy bag. Pt on room air; denies PENNY/SOB/lightheadedness. Pt IV fluids continued, drinking clear liquids, denies nausea. Accuchecks continued out of precaution r/t low blood sugars on previous shifts. Fall & safety precautions in place. POC discussed.

## 2025-04-27 NOTE — PLAN OF CARE
Pt received A&Ox4. Mandrin speaking. VSS. Afebrile. No c/o pain. All meds per MAR. PIV saline locked per orders. Up ambulating in room. Voiding. Advanced to soft diet. No c/o n/v or pain. Cleared for discharge. AVS given with all questions answered. PIV removed.         NURSING DISCHARGE NOTE    Discharged Home via Wheelchair.  Accompanied by RN  Belongings Taken by patient/family.    Problem: GASTROINTESTINAL - ADULT  Goal: Minimal or absence of nausea and vomiting  Description: INTERVENTIONS:- Maintain adequate hydration with IV or PO as ordered and tolerated- Nasogastric tube to low intermittent suction as ordered- Evaluate effectiveness of ordered antiemetic medications- Provide nonpharmacologic comfort measures as appropriate- Advance diet as tolerated, if ordered- Obtain nutritional consult as needed- Evaluate fluid balance  Outcome: Adequate for Discharge  Goal: Maintains or returns to baseline bowel function  Description: INTERVENTIONS:- Assess bowel function- Maintain adequate hydration with IV or PO as ordered and tolerated- Evaluate effectiveness of GI medications- Encourage mobilization and activity- Obtain nutritional consult as needed- Establish a toileting routine/schedule- Consider collaborating with pharmacy to review patient's medication profile  Outcome: Adequate for Discharge     Problem: PAIN - ADULT  Goal: Verbalizes/displays adequate comfort level or patient's stated pain goal  Description: INTERVENTIONS:- Encourage pt to monitor pain and request assistance- Assess pain using appropriate pain scale- Administer analgesics based on type and severity of pain and evaluate response- Implement non-pharmacological measures as appropriate and evaluate response- Consider cultural and social influences on pain and pain management- Manage/alleviate anxiety- Utilize distraction and/or relaxation techniques- Monitor for opioid side effects- Notify MD/LIP if interventions unsuccessful or patient reports new  pain- Anticipate increased pain with activity and pre-medicate as appropriate  Outcome: Adequate for Discharge

## 2025-04-27 NOTE — PROGRESS NOTES
[facility]  Progress Note    Anmol Gonzalez Patient Status:  Inpatient    2/15/1950 MRN [id number]   Location [facility] 0SW-A Attending Racquel Martinez,    Hosp Day # 3 PCP Jess Esquivel MD     Subjective:  The patient was seen and examined at bedside. She is tolerating a liquid diet. She was just advanced to a soft diet. She denies abdominal pain or distention. Her ostomy is functioning.     Objective/Physical Exam:  /64 (BP Location: Left arm)   Pulse 57   Temp 97.5 °F (36.4 °C) (Oral)   Resp 19   Wt 98 lb 6.4 oz (44.6 kg)   SpO2 98%   BMI 18.44 kg/m²     Intake/Output Summary (Last 24 hours) at 2025 1110  Last data filed at 2025 0251  Gross per 24 hour   Intake 470 ml   Output 1250 ml   Net -780 ml         General: Alert, oriented x3. No acute distress.  HEENT: Normocephalic, atraumatic. No scleral icterus.  Pulmonary: No respiratory distress, effort normal.   Abdomen: Non-distended, without tympany to percussion. Soft, non-tender to palpation. No rebound or guarding. No peritoneal signs. Left lower quadrant stoma is pink, viable and functioning with stool and gas in the ostomy appliance.   Extremities: No lower extremity edema. No clubbing or cyanosis.   Skin: Warm, dry. No jaundice.       Labs:      Lab Results   Component Value Date    INR 1.0 2025    INR 1.0 2024    INR 1.0 2024     Lab Results   Component Value Date     2025    K 3.2 2025    K 3.2 2025     2025    CO2 24.0 2025    BUN 10 2025    CREATSERUM 1.25 2025    GLU 97 2025    CA 8.7 2025          Assessment:  Problem List[1]      SBO-resolved    Plan:  Continue soft diet   Once tolerating a soft diet, the patient may discharge home from a general surgical standpoint  Antiemetics and analgesics as needed  Encourage ambulation and up to chair  Medical management per primary  DVT prophylaxis with heparin  GI prophylaxis with protonix  Follow  up with Dr. Landin as needed      The patient was discussed with Dr. Landin , and he is in agreement with the assessment and plan. My total face time with this patient was 25 minutes. Greater than half of the visit was spent in counseling the patient on the above listed diagnoses and treatment options.     Eda Ruano PA-C  4/27/2025  11:10 AM    This note was initiated by Eda Ruano PA-C.  The PA saw the patient in conjunction with me. The PA performed a history, exam, and developed the assessment and plan. I agree with the mentioned assessment and plan and have provided further documentation of my own, if necessary.    Patient is doing great.  She is anxious to be discharged home.  Her colostomy has been productive of flatus and stool.  She is tolerating food.    I had another conversation with the patient and her daughter.  My recommendation is to continue every attempt at nonoperative management if patient develops recurrent small bowel obstruction unless they become so frequent that they are greatly affecting her quality of life with ongoing hospitalizations.  The patient is welcome to follow-up with me as needed.  The patient and daughter expressed understanding and were agreeable to plan.    Jordan Landin MD  [facility] General Surgery           [1]   Patient Active Problem List  Diagnosis    Hydronephrosis, right    Colostomy in place (HCC)    Stage 3a chronic kidney disease (HCC)    Elevated lipoprotein A level    Hyperlipidemia    Elevated blood pressure reading    Insomnia    Frequent diarrhea    Heart murmur    Thrombocytopenia    SBO (small bowel obstruction) (HCC)    Primary hypertension    Iron deficiency anemia secondary to inadequate dietary iron intake    Hypernatremia

## 2025-04-28 ENCOUNTER — PATIENT OUTREACH (OUTPATIENT)
Dept: CASE MANAGEMENT | Age: 75
End: 2025-04-28

## 2025-04-28 NOTE — PROGRESS NOTES
Physician Clarification  Additional information related to the patient's condition  Underweight, 18.44 kg/m²      This note is part of the patient's medical record.

## 2025-04-28 NOTE — PROGRESS NOTES
Physician Clarification  Additional information related to the patient's condition  Hypernatremia   This note is part of the patient's medical record.

## 2025-04-28 NOTE — PROGRESS NOTES
TCM assist.    TCC/PCP request.  Thursday 6/5 @9:20  Existing appointment with PCP.    Patient is happy with existing appointment.    Confirmed with patient's daughter.    Closing encounter.

## 2025-04-29 ENCOUNTER — HOSPITAL ENCOUNTER (INPATIENT)
Facility: HOSPITAL | Age: 75
LOS: 5 days | Discharge: HOME | End: 2025-05-04
Attending: EMERGENCY MEDICINE | Admitting: HOSPITALIST
Payer: MEDICARE

## 2025-04-29 ENCOUNTER — APPOINTMENT (OUTPATIENT)
Dept: CT IMAGING | Facility: HOSPITAL | Age: 75
End: 2025-04-29
Attending: EMERGENCY MEDICINE
Payer: MEDICARE

## 2025-04-29 ENCOUNTER — APPOINTMENT (OUTPATIENT)
Dept: GENERAL RADIOLOGY | Facility: HOSPITAL | Age: 75
End: 2025-04-29
Attending: EMERGENCY MEDICINE
Payer: MEDICARE

## 2025-04-29 DIAGNOSIS — N13.1 HYDRONEPHROSIS DUE TO OBSTRUCTION OF URETER: ICD-10-CM

## 2025-04-29 DIAGNOSIS — K56.609 SBO (SMALL BOWEL OBSTRUCTION) (HCC): ICD-10-CM

## 2025-04-29 DIAGNOSIS — N17.9 AKI (ACUTE KIDNEY INJURY): Primary | ICD-10-CM

## 2025-04-29 LAB
ALBUMIN SERPL-MCNC: 4.3 G/DL (ref 3.2–4.8)
ALBUMIN/GLOB SERPL: 1.5 {RATIO} (ref 1–2)
ALP LIVER SERPL-CCNC: 51 U/L (ref 55–142)
ALT SERPL-CCNC: 33 U/L (ref 10–49)
ANION GAP SERPL CALC-SCNC: 13 MMOL/L (ref 0–18)
AST SERPL-CCNC: 25 U/L (ref ?–34)
BASOPHILS # BLD AUTO: 0.01 X10(3) UL (ref 0–0.2)
BASOPHILS NFR BLD AUTO: 0.1 %
BILIRUB SERPL-MCNC: 1.1 MG/DL (ref 0.2–1.1)
BUN BLD-MCNC: 24 MG/DL (ref 9–23)
CALCIUM BLD-MCNC: 9.5 MG/DL (ref 8.7–10.6)
CHLORIDE SERPL-SCNC: 111 MMOL/L (ref 98–112)
CO2 SERPL-SCNC: 18 MMOL/L (ref 21–32)
CREAT BLD-MCNC: 2.44 MG/DL (ref 0.55–1.02)
EGFRCR SERPLBLD CKD-EPI 2021: 20 ML/MIN/1.73M2 (ref 60–?)
EOSINOPHIL # BLD AUTO: 0 X10(3) UL (ref 0–0.7)
EOSINOPHIL NFR BLD AUTO: 0 %
ERYTHROCYTE [DISTWIDTH] IN BLOOD BY AUTOMATED COUNT: 13.2 %
GLOBULIN PLAS-MCNC: 2.9 G/DL (ref 2–3.5)
GLUCOSE BLD-MCNC: 81 MG/DL (ref 70–99)
HCT VFR BLD AUTO: 36.5 % (ref 35–48)
HGB BLD-MCNC: 12.3 G/DL (ref 12–16)
IMM GRANULOCYTES # BLD AUTO: 0.16 X10(3) UL (ref 0–1)
IMM GRANULOCYTES NFR BLD: 1.7 %
LYMPHOCYTES # BLD AUTO: 0.26 X10(3) UL (ref 1–4)
LYMPHOCYTES NFR BLD AUTO: 2.7 %
MCH RBC QN AUTO: 32 PG (ref 26–34)
MCHC RBC AUTO-ENTMCNC: 33.7 G/DL (ref 31–37)
MCV RBC AUTO: 95.1 FL (ref 80–100)
MONOCYTES # BLD AUTO: 0.22 X10(3) UL (ref 0.1–1)
MONOCYTES NFR BLD AUTO: 2.3 %
NEUTROPHILS # BLD AUTO: 9.03 X10 (3) UL (ref 1.5–7.7)
NEUTROPHILS # BLD AUTO: 9.03 X10(3) UL (ref 1.5–7.7)
NEUTROPHILS NFR BLD AUTO: 93.2 %
OSMOLALITY SERPL CALC.SUM OF ELEC: 297 MOSM/KG (ref 275–295)
PLATELET # BLD AUTO: 146 10(3)UL (ref 150–450)
PLATELETS.RETICULATED NFR BLD AUTO: 2.3 % (ref 0–7)
POTASSIUM SERPL-SCNC: 4.3 MMOL/L (ref 3.5–5.1)
PROT SERPL-MCNC: 7.2 G/DL (ref 5.7–8.2)
RBC # BLD AUTO: 3.84 X10(6)UL (ref 3.8–5.3)
SODIUM SERPL-SCNC: 142 MMOL/L (ref 136–145)
WBC # BLD AUTO: 9.7 X10(3) UL (ref 4–11)

## 2025-04-29 PROCEDURE — 99223 1ST HOSP IP/OBS HIGH 75: CPT | Performed by: HOSPITALIST

## 2025-04-29 PROCEDURE — 74176 CT ABD & PELVIS W/O CONTRAST: CPT | Performed by: EMERGENCY MEDICINE

## 2025-04-29 PROCEDURE — 71045 X-RAY EXAM CHEST 1 VIEW: CPT | Performed by: EMERGENCY MEDICINE

## 2025-04-29 RX ORDER — ONDANSETRON 2 MG/ML
4 INJECTION INTRAMUSCULAR; INTRAVENOUS ONCE
Status: COMPLETED | OUTPATIENT
Start: 2025-04-29 | End: 2025-04-29

## 2025-04-29 RX ORDER — SENNOSIDES 8.6 MG
17.2 TABLET ORAL NIGHTLY PRN
Status: DISCONTINUED | OUTPATIENT
Start: 2025-04-29 | End: 2025-05-04

## 2025-04-29 RX ORDER — HYDROMORPHONE HYDROCHLORIDE 1 MG/ML
0.4 INJECTION, SOLUTION INTRAMUSCULAR; INTRAVENOUS; SUBCUTANEOUS EVERY 2 HOUR PRN
Status: DISCONTINUED | OUTPATIENT
Start: 2025-04-29 | End: 2025-05-04

## 2025-04-29 RX ORDER — BENZONATATE 200 MG/1
200 CAPSULE ORAL 3 TIMES DAILY PRN
Status: DISCONTINUED | OUTPATIENT
Start: 2025-04-29 | End: 2025-05-04

## 2025-04-29 RX ORDER — HYDROMORPHONE HYDROCHLORIDE 1 MG/ML
0.2 INJECTION, SOLUTION INTRAMUSCULAR; INTRAVENOUS; SUBCUTANEOUS EVERY 2 HOUR PRN
Status: DISCONTINUED | OUTPATIENT
Start: 2025-04-29 | End: 2025-05-04

## 2025-04-29 RX ORDER — ONDANSETRON 2 MG/ML
8 INJECTION INTRAMUSCULAR; INTRAVENOUS EVERY 6 HOURS PRN
Status: DISCONTINUED | OUTPATIENT
Start: 2025-04-29 | End: 2025-05-04

## 2025-04-29 RX ORDER — ECHINACEA PURPUREA EXTRACT 125 MG
1 TABLET ORAL
Status: DISCONTINUED | OUTPATIENT
Start: 2025-04-29 | End: 2025-05-04

## 2025-04-29 RX ORDER — HYDRALAZINE HYDROCHLORIDE 20 MG/ML
10 INJECTION INTRAMUSCULAR; INTRAVENOUS EVERY 4 HOURS PRN
Status: DISCONTINUED | OUTPATIENT
Start: 2025-04-29 | End: 2025-05-04

## 2025-04-29 RX ORDER — SODIUM CHLORIDE 9 MG/ML
INJECTION, SOLUTION INTRAVENOUS CONTINUOUS
Status: DISCONTINUED | OUTPATIENT
Start: 2025-04-29 | End: 2025-04-30

## 2025-04-29 RX ORDER — POLYETHYLENE GLYCOL 3350 17 G/17G
17 POWDER, FOR SOLUTION ORAL DAILY PRN
Status: DISCONTINUED | OUTPATIENT
Start: 2025-04-29 | End: 2025-05-04

## 2025-04-29 RX ORDER — HYDROMORPHONE HYDROCHLORIDE 1 MG/ML
INJECTION, SOLUTION INTRAMUSCULAR; INTRAVENOUS; SUBCUTANEOUS
Status: COMPLETED
Start: 2025-04-29 | End: 2025-04-29

## 2025-04-29 RX ORDER — ONDANSETRON 2 MG/ML
INJECTION INTRAMUSCULAR; INTRAVENOUS
Status: COMPLETED
Start: 2025-04-29 | End: 2025-04-29

## 2025-04-29 RX ORDER — BISACODYL 10 MG
10 SUPPOSITORY, RECTAL RECTAL
Status: DISCONTINUED | OUTPATIENT
Start: 2025-04-29 | End: 2025-05-04

## 2025-04-29 RX ORDER — HYDROMORPHONE HYDROCHLORIDE 1 MG/ML
0.5 INJECTION, SOLUTION INTRAMUSCULAR; INTRAVENOUS; SUBCUTANEOUS EVERY 30 MIN PRN
Refills: 0 | Status: DISCONTINUED | OUTPATIENT
Start: 2025-04-29 | End: 2025-05-04

## 2025-04-29 RX ORDER — ACETAMINOPHEN 500 MG
500 TABLET ORAL EVERY 4 HOURS PRN
Status: DISCONTINUED | OUTPATIENT
Start: 2025-04-29 | End: 2025-05-04

## 2025-04-29 RX ORDER — HEPARIN SODIUM 5000 [USP'U]/ML
5000 INJECTION, SOLUTION INTRAVENOUS; SUBCUTANEOUS EVERY 8 HOURS SCHEDULED
Status: DISCONTINUED | OUTPATIENT
Start: 2025-04-30 | End: 2025-05-04

## 2025-04-29 RX ORDER — ENOXAPARIN SODIUM 100 MG/ML
40 INJECTION SUBCUTANEOUS DAILY
Status: DISCONTINUED | OUTPATIENT
Start: 2025-04-30 | End: 2025-04-29 | Stop reason: DRUGHIGH

## 2025-04-30 ENCOUNTER — APPOINTMENT (OUTPATIENT)
Dept: INTERVENTIONAL RADIOLOGY/VASCULAR | Facility: HOSPITAL | Age: 75
End: 2025-04-30
Attending: HOSPITALIST
Payer: MEDICARE

## 2025-04-30 LAB
ANION GAP SERPL CALC-SCNC: 16 MMOL/L (ref 0–18)
BILIRUB UR QL STRIP.AUTO: NEGATIVE
BUN BLD-MCNC: 28 MG/DL (ref 9–23)
CALCIUM BLD-MCNC: 8.3 MG/DL (ref 8.7–10.6)
CHLORIDE SERPL-SCNC: 114 MMOL/L (ref 98–112)
CO2 SERPL-SCNC: 13 MMOL/L (ref 21–32)
CREAT BLD-MCNC: 3.32 MG/DL (ref 0.55–1.02)
EGFRCR SERPLBLD CKD-EPI 2021: 14 ML/MIN/1.73M2 (ref 60–?)
ERYTHROCYTE [DISTWIDTH] IN BLOOD BY AUTOMATED COUNT: 13.8 %
GLUCOSE BLD-MCNC: 70 MG/DL (ref 70–99)
GLUCOSE UR STRIP.AUTO-MCNC: NORMAL MG/DL
HCT VFR BLD AUTO: 35.5 % (ref 35–48)
HGB BLD-MCNC: 11.5 G/DL (ref 12–16)
KETONES UR STRIP.AUTO-MCNC: NEGATIVE MG/DL
LEUKOCYTE ESTERASE UR QL STRIP.AUTO: 500
MCH RBC QN AUTO: 32.8 PG (ref 26–34)
MCHC RBC AUTO-ENTMCNC: 32.4 G/DL (ref 31–37)
MCV RBC AUTO: 101.1 FL (ref 80–100)
NITRITE UR QL STRIP.AUTO: NEGATIVE
OSMOLALITY SERPL CALC.SUM OF ELEC: 300 MOSM/KG (ref 275–295)
PH UR STRIP.AUTO: 6.5 [PH] (ref 5–8)
PLATELET # BLD AUTO: 117 10(3)UL (ref 150–450)
POTASSIUM SERPL-SCNC: 4.5 MMOL/L (ref 3.5–5.1)
PROT UR STRIP.AUTO-MCNC: 20 MG/DL
RBC # BLD AUTO: 3.51 X10(6)UL (ref 3.8–5.3)
SODIUM SERPL-SCNC: 143 MMOL/L (ref 136–145)
SP GR UR STRIP.AUTO: 1.01 (ref 1–1.03)
UROBILINOGEN UR STRIP.AUTO-MCNC: NORMAL MG/DL
WBC # BLD AUTO: 19 X10(3) UL (ref 4–11)
WBC #/AREA URNS AUTO: >50 /HPF

## 2025-04-30 PROCEDURE — 0D9670Z DRAINAGE OF STOMACH WITH DRAINAGE DEVICE, VIA NATURAL OR ARTIFICIAL OPENING: ICD-10-PCS | Performed by: SURGERY

## 2025-04-30 PROCEDURE — 99223 1ST HOSP IP/OBS HIGH 75: CPT | Performed by: SURGERY

## 2025-04-30 PROCEDURE — 0T25X0Z CHANGE DRAINAGE DEVICE IN KIDNEY, EXTERNAL APPROACH: ICD-10-PCS | Performed by: RADIOLOGY

## 2025-04-30 PROCEDURE — 99222 1ST HOSP IP/OBS MODERATE 55: CPT | Performed by: PHYSICIAN ASSISTANT

## 2025-04-30 PROCEDURE — 99233 SBSQ HOSP IP/OBS HIGH 50: CPT | Performed by: HOSPITALIST

## 2025-04-30 RX ORDER — IOPAMIDOL 612 MG/ML
30 INJECTION, SOLUTION INTRAVASCULAR
Status: COMPLETED | OUTPATIENT
Start: 2025-04-30 | End: 2025-04-30

## 2025-04-30 RX ORDER — METOPROLOL TARTRATE 1 MG/ML
5 INJECTION, SOLUTION INTRAVENOUS EVERY 6 HOURS
Status: DISCONTINUED | OUTPATIENT
Start: 2025-04-30 | End: 2025-05-03

## 2025-04-30 RX ORDER — MIDAZOLAM HYDROCHLORIDE 1 MG/ML
INJECTION INTRAMUSCULAR; INTRAVENOUS
Status: COMPLETED
Start: 2025-04-30 | End: 2025-04-30

## 2025-04-30 RX ORDER — LEVOFLOXACIN 5 MG/ML
500 INJECTION, SOLUTION INTRAVENOUS ONCE
Status: COMPLETED | OUTPATIENT
Start: 2025-04-30 | End: 2025-04-30

## 2025-04-30 RX ORDER — ONDANSETRON 2 MG/ML
INJECTION INTRAMUSCULAR; INTRAVENOUS
Status: COMPLETED
Start: 2025-04-30 | End: 2025-04-30

## 2025-04-30 RX ORDER — LIDOCAINE HYDROCHLORIDE 10 MG/ML
INJECTION, SOLUTION INFILTRATION; PERINEURAL
Status: COMPLETED
Start: 2025-04-30 | End: 2025-04-30

## 2025-04-30 NOTE — PROGRESS NOTES
[facility]   part of [facility]     Hospitalist Progress Note     Anmol Gonzalez Patient Status:  Inpatient    2/15/1950 MRN [id number]   [facility] 5NW-A Attending Edmundo Varela MD   Hosp Day # 1 PCP Jess Esquivel MD     Chief Complaint: abd pain    Subjective:     Patient feels better today    Objective:    Review of Systems:   ROS completed; pertinent positive and negatives stated in subjective.    Vital signs:  Temp:  [97.6 °F (36.4 °C)-98.9 °F (37.2 °C)] 97.8 °F (36.6 °C)  Pulse:  [66-89] 66  Resp:  [14-20] 18  BP: (125-195)/(44-78) 125/44  SpO2:  [96 %-100 %] 99 %    Physical Exam:    General: No acute distress  Respiratory: Clear to auscultation bilaterally  Cardiovascular: S1, S2.  Abdomen: Soft  Neuro: No new focal deficits      Diagnostic Data:    Labs:  Recent Labs   Lab 2542 25  0748 25  0641   WBC 7.4 4.0 5.0 9.7 19.0*   HGB 12.9 10.0* 9.7* 12.3 11.5*   MCV 92.5 98.7 97.4 95.1 101.1*   .0 140.0* 139.0* 146.0* 117.0*       Recent Labs   Lab 25  0742 259 25  0641   *   < > 97 81 70   BUN 27*   < > 10 24* 28*   CREATSERUM 1.58*   < > 1.25* 2.44* 3.32*   CA 10.7*   < > 8.7 9.5 8.3*   ALB 5.0*  --   --  4.3  --       < > 144 142 143   K 4.1   < > 3.2*  3.2* 4.3 4.5      < > 112 111 114*   CO2 22.0   < > 24.0 18.0* 13.0*   ALKPHO 59  --   --  51*  --    AST 76*  --   --  25  --    *  --   --  33  --    BILT 0.9  --   --  1.1  --    TP 8.2  --   --  7.2  --     < > = values in this interval not displayed.       Estimated Glomerular Filtration Rate: 14 mL/min/1.73m2 (A) (result from lab).     No results for input(s): \"TROP\", \"TROPHS\", \"CK\" in the last 168 hours.    No results for input(s): \"PTP\", \"INR\" in the last 168 hours.           Imaging: Imaging data reviewed in Epic.    Medications: Scheduled Medications[1]    Assessment & Plan:      #SBO  NPO  IVF  NGT to LIS    #Right hydroureteronephrosis  Nephrostomy tube exchanged    #JAMES  IVF  Nephrostomy tube exchanged    #Metabolic Acidosis  IVF adjusted  monitor    #HTN  IV BB for now    #HLD  statin      Dispo  DIsucssed with surgery and urology.  Nephrostomy tube replaced today.  Renal function much worse.  Adjust IVF    Edmundo Varela MD    Supplementary Documentation:     Quality:    DVT Mechanical Prophylaxis:   SCDs,    DVT Pharmacologic Prophylaxis   Medication    heparin (Porcine) 5000 UNIT/ML injection 5,000 Units                Code Status: Not on file  Pratt: No urinary catheter in place  Pratt Duration (in days):   Central line:    SHELLEY:       Discharge is dependent on: clinical stability  At this point Ms. Gonzalez is expected to be discharge to: home    The 21st Century Cures Act makes medical notes like these available to patients in the interest of transparency. Please be advised this is a medical document. Medical documents are intended to carry relevant information, facts as evident, and the clinical opinion of the practitioner. The medical note is intended as peer to peer communication and may appear blunt or direct. It is written in medical language and may contain abbreviations or verbiage that are unfamiliar.              **Certification      PHYSICIAN Certification of Need for Inpatient Hospitalization - Initial Certification    Patient will require inpatient services that will reasonably be expected to span two midnight's based on the clinical documentation in H+P.   Based on patients current state of illness, I anticipate that, after discharge, patient will require TBD.         [1]    heparin  5,000 Units Subcutaneous Q8H JENNYFER

## 2025-04-30 NOTE — ED PROVIDER NOTES
Patient Seen in: [facility] Emergency Department      History     Chief Complaint   Patient presents with    Nausea/Vomiting/Diarrhea     Stated Complaint:     Subjective:   HPI    75-year-old female presents for evaluation of abdominal pain and vomiting which started today.  Patient was just released from the hospital 3 days ago after several day stay for SBO that was treated conservatively.  She has a history of right nephrostomy  due to bladder cancer and colostomy  History of Present Illness               Objective:     Past Medical History:    Cancer (HCC)    cervicle    CKD (chronic kidney disease)              Past Surgical History:   Procedure Laterality Date    Other surgical history      BLAADER REMOVAL    Part removal colon w end colostomy                  Social History     Socioeconomic History    Marital status:    Tobacco Use    Smoking status: Never     Passive exposure: Never    Smokeless tobacco: Never   Vaping Use    Vaping status: Never Used   Substance and Sexual Activity    Alcohol use: Never    Drug use: Never   Other Topics Concern    Caffeine Concern No    Exercise No    Seat Belt No    Special Diet No    Stress Concern No    Weight Concern No     Social Drivers of Health     Food Insecurity: No Food Insecurity (4/24/2025)    NCSS - Food Insecurity     Worried About Running Out of Food in the Last Year: No     Ran Out of Food in the Last Year: No   Transportation Needs: No Transportation Needs (4/24/2025)    NCSS - Transportation     Lack of Transportation: No   Housing Stability: Not At Risk (4/24/2025)    NCSS - Housing/Utilities     Has Housing: Yes     Worried About Losing Housing: No     Unable to Get Utilities: No                                Physical Exam     ED Triage Vitals [04/29/25 1950]   BP (!) 192/78   Pulse 73   Resp 20   Temp 98.9 °F (37.2 °C)   Temp src Oral   SpO2 100 %   O2 Device None (Room air)       Current Vitals:   Vital Signs  BP: (!) 179/62  Pulse:  76  Resp: 14  Temp: 98.9 °F (37.2 °C)  Temp src: Oral  MAP (mmHg): 97    Oxygen Therapy  SpO2: 100 %  O2 Device: None (Room air)        Physical Exam     Physical Exam         General: Alert, oriented, retching  HEENT:  Pupils equal reactive.  Extraocular motions intact. Dry MM  Neck: Supple  Lungs: Clear to auscultation bilaterally.  Heart: Regular rate and rhythm.  Abdomen: Soft, BLQ TTP  Skin: No rash.  No edema.  Neurologic: No focal neurologic deficits.  Normal speech pattern  Musculoskeletal: No tenderness or deformity noted.  Full range of motion throughout.          ED Course     Labs Reviewed   COMP METABOLIC PANEL (14) - Abnormal; Notable for the following components:       Result Value    CO2 18.0 (*)     BUN 24 (*)     Creatinine 2.44 (*)     Calculated Osmolality 297 (*)     eGFR-Cr 20 (*)     Alkaline Phosphatase 51 (*)     All other components within normal limits   CBC WITH DIFFERENTIAL WITH PLATELET - Abnormal; Notable for the following components:    .0 (*)     Neutrophil Absolute Prelim 9.03 (*)     Neutrophil Absolute 9.03 (*)     Lymphocyte Absolute 0.26 (*)     All other components within normal limits   RAINBOW DRAW BLUE          Results                                 MDM      Differential diagnosis includes viral gastroenteritis, SBO, diverticulitis    Admission disposition: 4/29/2025 10:09 PM       Chemistry shows creatinine of 2.4.  This is well above baseline indicating JAMES.  IV fluids were given    CBC is normal    Medications   HYDROmorphone (Dilaudid) 1 MG/ML injection 0.5 mg (0.5 mg Intravenous Given 4/29/25 2042)   sodium chloride 0.9 % IV bolus 1,000 mL (1,000 mL Intravenous New Bag 4/29/25 2136)   ondansetron (Zofran) 4 MG/2ML injection 4 mg (4 mg Intravenous Given 4/29/25 2042)     CT ABDOMEN+PELVIS(CPT=74176)  Result Date: 4/29/2025  PROCEDURE:  CT ABDOMEN+PELVIS (CPT=74176)  COMPARISON:  [facility], CT ABDOMEN+PELVIS(CPT=74176), 4/23/2025, 11:20 PM.  INDICATIONS:   Abdominal pain, assess for bowel obstruction  TECHNIQUE:  Unenhanced multislice CT scanning was performed from the dome of the diaphragm to the pubic symphysis.  Dose reduction techniques were used. Dose information is transmitted to the ACR (American College of Radiology) NRDR (National Radiology Data Registry) which includes the Dose Index Registry.  PATIENT STATED HISTORY: (As transcribed by Technologist)  Nausea with recent small bowel obstruction.    FINDINGS:  LIVER:  Normal morphology with few hypodensities that are too small to characterize, though stable. BILIARY:  Cholelithiasis.  Normal bile ducts. PANCREAS:  No lesion, fluid collection, ductal dilatation, or atrophy.  SPLEEN:  No enlargement or focal lesion.  KIDNEYS:  Percutaneous nephrostomy of the right collecting system is coiled within the renal pelvis.  There is moderate/severe hydroureteronephrosis of bilateral collecting systems, stable on the left, new on the right compared to 04/23/2025.  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  No obstructing calculus identified.  This may be secondary to high-grade anastomotic stricturing. ADRENALS:  No mass or enlargement.  AORTA/VASCULAR:    Extensive calcified atherosclerosis without aneurysm. RETROPERITONEUM:  No mass or adenopathy.  BOWEL/MESENTERY:  Extensive postsurgical changes of the bowel with left-sided colostomy noted.  There are multiple moderately dilated small bowel loops within the pelvis reaching a caliber of 4 cm, though the degree of distension is improved compared to 04/23/2025 previously measuring up to 6 cm. ABDOMINAL WALL:  No mass or hernia.  URINARY BLADDER:  Postoperative changes of cystectomy. PELVIC NODES:  No adenopathy.  PELVIC ORGANS:  No visible mass.  Pelvic organs appropriate for patient age.  BONES:  No bony lesion or fracture.  LUNG BASES:  No visible pulmonary or pleural disease.  OTHER:  Negative.              CONCLUSION:   1. 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  Right-sided nephroureteral stent in place, though there is moderate/severe hydroureteronephrosis bilaterally, stable on the left and new on the right compared to 04/23/2025.  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  2. Postoperative changes of the bowel with left mid abdominal colostomy noted.  There continue to be moderately distended small bowel loops within the lower abdomen and pelvis, though the degree of bowel distention is improved compared to 04/23/2025 indicating improving ileus or obstruction.   LOCATION:  [facility]   Dictated by (CST): Micaela Crawford MD on 4/29/2025 at 9:44 PM     Finalized by (CST): Micaela Crawford MD on 4/29/2025 at 9:54 PM       Discussed with Dr Dill surgery:  NGT recommended.    Discussed with Dr Mccormick urology:  patient scheduled for nephrostomy tube change soon    Spoke with Dr Swenson for admit    Medical Decision Making  Amount and/or Complexity of Data Reviewed  Independent Historian: caregiver     Details: Daughter at bedside        Disposition and Plan     Clinical Impression:  1. JAMES (acute kidney injury)    2. SBO (small bowel obstruction) (HCC)    3. Hydronephrosis due to obstruction of ureter         Disposition:  Admit  4/29/2025 10:09 pm    Follow-up:  No follow-up provider specified.        Medications Prescribed:  Current Discharge Medication List          Supplementary Documentation:         Hospital Problems       Present on Admission  Date Reviewed: 4/23/2025          ICD-10-CM Noted POA    * (Principal) JAMES (acute kidney injury) N17.9 4/29/2025 Unknown

## 2025-04-30 NOTE — PLAN OF CARE
A&Ox4. RA, . Tele, NSR, Heparin. NPO. Colostomy, nephrostomy. NG placed to suction.   UA collected. Right extended 0.9 @100. IV Levaquin.  Up SBA.  Pt and pt's dtr updated on plan of care.     Problem: Patient/Family Goals  Goal: Patient/Family Long Term Goal  Description: Patient's Long Term Goal: discharge  Interventions:- follow poc  - See additional Care Plan goals for specific interventions  Outcome: Progressing  Goal: Patient/Family Short Term Goal  Description: Patient's Short Term Goal:   4/29 NOC: rest, sleep  4/30AM: nephrostomy replacement    Interventions: - - See additional Care Plan goals for specific interventions  Outcome: Progressing

## 2025-04-30 NOTE — PROGRESS NOTES
NURSING ADMISSION NOTE    Patient admitted via Cart  Oriented to room.  Safety precautions initiated.  Bed in low position.  Call light in reach.

## 2025-04-30 NOTE — PLAN OF CARE
Pt is  A&O x 4. Mandarin speaking. VSS, afebrile. SPO2 maintained on RA. NSR on tele. NPO, NG. Colostomy LUQ, Nephrostomy Right back, pending replacement 4/30/25. Up at cynthia. Mild abdomen pain. PIV IVF.  Patient updated on poc, no further needs at this time. Safety precautions in place.     Problem: Patient/Family Goals  Goal: Patient/Family Long Term Goal  Description: Patient's Long Term Goal: discharge  Interventions:- follow poc  - See additional Care Plan goals for specific interventions  Outcome: Progressing  Goal: Patient/Family Short Term Goal  Description: Patient's Short Term Goal:   4/29 NOC: rest, sleep  Interventions: - - See additional Care Plan goals for specific interventions  Outcome: Progressing

## 2025-04-30 NOTE — ED INITIAL ASSESSMENT (HPI)
Pt was just discharged Sunday for having a bowel obstruction. Pt was tolerated PO and liquids until this morning when Pt began having severe abdominal pain that started today  in the front area of her stomach in all quadrants. Pt has not been able to keep anything down.

## 2025-04-30 NOTE — ED QUICK NOTES
Orders for admission, patient is aware of plan and ready to go upstairs. Any questions, please call ED RN Lara at extension [phone].     Patient Covid vaccination status: Fully vaccinated     COVID Test Ordered in ED: None    COVID Suspicion at Admission: N/A    Running Infusions: Medication Infusions[1] None    Mental Status/LOC at time of transport: manddarin speaking, daughter translating at bedside, a&ox4    Other pertinent information:   CIWA score: N/A   NIH score:  N/A             [1]

## 2025-04-30 NOTE — CONSULTS
[facility]   part of [facility]    Report of Consultation    Anmol Gonzalez Patient Status:  Inpatient    2/15/1950 MRN [id number]   Location [facility] 5NW-A Attending Bobby Swenson MD   Hosp Day # 1 PCP Jess Esquivel MD     Date of Admission:  2025  Date of Consult:  2025    Reason for Consultation:  Bilateral hydronephrosis with RIGHT nephrostomy tube    History of Present Illness:  Anmol Gonzalez is a a(n) 75 year old female with history of HTN, HL, CKD, cervical cancer s/p hysterectomy and chemoradiation, SBO c/b enterotomy with end ostomy, atrophic L kidney s/p VV fistula sp complex reconstruction (cystectomy, cutaneous ureterostomy with trans U-U) and right hydronephrosis managed by nephrostomy tube, who presented to the ED for severe abdominal pain and vomiting. She was afebrile, temp 98.9 and hypertensive with /78 at time of presentation. Labs showed white count 9.7 with left shift, serum creatinine 2.44 (baseline 1.2-1.4). BUN 24. CT scan with moderately distended small bowel loops within the lower abdomen, improved from prior imaging 25, in addition to new bilateral hydronephrosis (from 2024 imaging, although stable left from most recent imaging). Patient admitted for further evaluation and mgmt. Urology consulted.    Patient feeling better with NG placement. She has no flank pain, but some \"soreness\". She notes that the nephrostomy tube hasn't been draining as well in the last week. No hematuria. She is agreeable to NT exchange today.    Patient seen previously by Dr. Raymundo Mccormick to establish care here given complex  history which are outlined below from most recent visit, 24.     PRIOR  history/notes from Dr. Mccormick:  Patient with complicated history of vesicovaginal fistula following chemoradiation hysterectomy for cervical cancer in .  In  she had a small bowel obstruction and underwent an enterotomy and ileocecectomy and later this was converted to  a colostomy.  She also followed closely with nephrology at that time for CKD.  There are minimal urology notes available.    There is a inpatient note from 11/2022 which states that patient had a total pelvic exenteration.  Urologist was Dr. Treviño in [place].  There was extensive prior radiation noted at the time.  Very large vesicovaginal fistula.  She therefore had cystectomy and a left to right trans ureteral ureterostomy and a right cutaneous ureterostomy due to lack of safe bowel or colon for conduit.  Appears LEFT ureter was brought to skin- RIGHT ureter was plugged into the mid LEFT ureter.   Per notes, due to very high risk for stricture the plan was to maintain stent long-term.  Initially Dr. Treviño was managing this with a ureteral stent that he would change in his office under fluoroscopy into the left kidney.  Additionally, she has an extensive history of the left atrophied kidney, this was noted before her above surgery.  At some point, decision was made to change to a right-sided nephrostomy tube.     Per daughter, initially stent was in left kidney.   She also states that the left side did not have a NT placed as it was thought that any pressure from here would be relieved via the right side.      She had an episode of sepsis and March 2024.  It was due to nephrostomy tube malfunction.  Last changed 5/24/24; due every 3mo per notes. Last was uncomplicated her notes.   She had a Lasix renogram on 6-.  This showed  Differential Function:  Left Kidney: 34%  Right Kidney: 66%     T 1/2 for Lasix Clearance, kidney:  Left Kidney: 18 minutes  Right Kidney: 11 minutes     T 1/2 for Lasix Clearance, collecting system:  Left Kidney: 36.7 minutes  Right Kidney: 8 minutes     Last RBUS 6/6;   RIGHt- no hydronephrosis   LEFT- mild hydronephrosis (improved from prior)     I talked to Dr. Ferrer on 7/25;   He states that the decision was made to only maintain RIGHT NT as most urine would reflux up the UU and  into the nephrostomy tube from the left side.   He states the UU was very tight,  cutaneous ureteroscopy was very narrow as well. He does believe that a re-do procedure would be possible in the future.   No output from the cutenous ureterostomy now.   Family wanted to avoid any larger reconstruction; wanted to keep with chronic NT.      At visit 7/24/24;   Decision made to replace the NT and perform antegrade studies ;      Is sp IR Xc on 8/14;      A gentle hand syringe nephrostogram was performed.  This demonstrated the existing    percutaneous nephrostomy 2 to be in satisfactory position, with the pigtail portion at the level of the renal pelvis.  Mild distension of the collecting system.   Subsequent over-the-wire exchange for the same size nephrostomy tube, 10 Greek.  Confirmation of position with positive contrast, pigtail at renal pelvis level.  The system was secured at the skin exit site with 2 0 Ethilon suture.  It was connected to   a gravity bag.  Sterile dressings were placed.  Note made of mild sediment on the catheter.     History:  Past Medical History[1]  Past Surgical History[2]  Family History[3]   reports that she has never smoked. She has never been exposed to tobacco smoke. She has never used smokeless tobacco. She reports that she does not drink alcohol and does not use drugs.    Allergies:  Allergies[4]    Medications:  Current Hospital Medications[5]    Review of Systems:  Pertinent items are noted in HPI.    Physical Exam:  /61 (BP Location: Left arm)   Pulse 75   Temp 98.4 °F (36.9 °C) (Oral)   Resp 18   Ht 5' 1\" (1.549 m)   Wt 102 lb 1.2 oz (46.3 kg)   SpO2 97%   BMI 19.29 kg/m²   General appearance: alert, appears stated age, cooperative, no distress, and NGT in place  Head: Normocephalic, without obvious abnormality, atraumatic  Back:  no CVAT, right NT in place draining yellow urine ~150mL in bag  Lungs: non labored respirations  Abdomen: soft, minimally tender,  +ostomy  Neurologic: Grossly normal  Psych appropriate affect and mood    Laboratory Data:  Lab Results   Component Value Date    WBC 19.0 04/30/2025    HGB 11.5 04/30/2025    HCT 35.5 04/30/2025    .0 04/30/2025    CREATSERUM 3.32 04/30/2025    BUN 28 04/30/2025     04/30/2025    K 4.5 04/30/2025     04/30/2025    CO2 13.0 04/30/2025    GLU 70 04/30/2025    CA 8.3 04/30/2025    ALB 4.3 04/29/2025    ALKPHO 51 04/29/2025    BILT 1.1 04/29/2025    TP 7.2 04/29/2025    AST 25 04/29/2025    ALT 33 04/29/2025       Urinalysis Results (last three years):  Recent Labs     12/02/24  1459   COLORUR Light-Yellow   CLARITY Clear   SPECGRAVITY 1.014   PHURINE 7.5   PROUR 30*   GLUUR Normal   KETUR 20*   BILUR Negative   BLOODURINE Negative   NITRITE Negative   UROBILINOGEN Normal   LEUUR Negative   WBCUR 1-5   RBCUR 6-10*   BACUR None Seen       Urine Culture Results (last three years):  No results found for: \"URINECUL\"    Imaging  XR CHEST AP PORTABLE  (CPT=71045)  Result Date: 4/29/2025  PROCEDURE:  XR CHEST AP PORTABLE  (CPT=71045)  TECHNIQUE:  AP chest radiograph was obtained.  COMPARISON:  [facility] , XR, XR CHEST AP PORTABLE  (CPT=71045), 4/24/2025, 0:55 AM.  INDICATIONS:  Verify correct tube placement  PATIENT STATED HISTORY: (As transcribed by Technologist)  Patient offered no additional history at this time.     FINDINGS:             CONCLUSION:   Enteric catheter terminates in the expected location of the gastric body/fundus.  Heart size within normal limits.  Patchy left basilar/retrocardiac opacity may be atelectatic or inflammatory.  No significant pleural effusion or appreciable pneumothorax.   LOCATION:  [facility]      Dictated by (CST): Micaela Crawford MD on 4/29/2025 at 11:01 PM     Finalized by (CST): Micaela Crawford MD on 4/29/2025 at 11:02 PM       CT ABDOMEN+PELVIS(CPT=74176)  Result Date: 4/29/2025  PROCEDURE:  CT ABDOMEN+PELVIS (CPT=74176)  COMPARISON:  [facility] , CT, CT ABDOMEN+PELVIS(GUQ=05230),  4/23/2025, 11:20 PM.  INDICATIONS:  Abdominal pain, assess for bowel obstruction  TECHNIQUE:  Unenhanced multislice CT scanning was performed from the dome of the diaphragm to the pubic symphysis.  Dose reduction techniques were used. Dose information is transmitted to the ACR (American College of Radiology) NRDR (National Radiology Data Registry) which includes the Dose Index Registry.  PATIENT STATED HISTORY: (As transcribed by Technologist)  Nausea with recent small bowel obstruction.    FINDINGS:  LIVER:  Normal morphology with few hypodensities that are too small to characterize, though stable. BILIARY:  Cholelithiasis.  Normal bile ducts. PANCREAS:  No lesion, fluid collection, ductal dilatation, or atrophy.  SPLEEN:  No enlargement or focal lesion.  KIDNEYS:  Percutaneous nephrostomy of the right collecting system is coiled within the renal pelvis.  There is moderate/severe hydroureteronephrosis of bilateral collecting systems, stable on the left, new on the right compared to 04/23/2025.  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  No obstructing calculus identified.  This may be secondary to high-grade anastomotic stricturing. ADRENALS:  No mass or enlargement.  AORTA/VASCULAR:    Extensive calcified atherosclerosis without aneurysm. RETROPERITONEUM:  No mass or adenopathy.  BOWEL/MESENTERY:  Extensive postsurgical changes of the bowel with left-sided colostomy noted.  There are multiple moderately dilated small bowel loops within the pelvis reaching a caliber of 4 cm, though the degree of distension is improved compared to 04/23/2025 previously measuring up to 6 cm. ABDOMINAL WALL:  No mass or hernia.  URINARY BLADDER:  Postoperative changes of cystectomy. PELVIC NODES:  No adenopathy.  PELVIC ORGANS:  No visible mass.  Pelvic organs appropriate for patient age.  BONES:  No bony lesion or fracture.  LUNG BASES:  No visible pulmonary or  pleural disease.  OTHER:  Negative.             CONCLUSION:   1. 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  Right-sided nephroureteral stent in place, though there is moderate/severe hydroureteronephrosis bilaterally, stable on the left and new on the right compared to 04/23/2025.  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  2. Postoperative changes of the bowel with left mid abdominal colostomy noted.  There continue to be moderately distended small bowel loops within the lower abdomen and pelvis, though the degree of bowel distention is improved compared to 04/23/2025 indicating improving ileus or obstruction.   LOCATION:  [facility]   Dictated by ([facility]): Micaela Crawford MD on 4/29/2025 at 9:44 PM     Finalized by ([facility]): Micaela Crawford MD on 4/29/2025 at 9:54 PM       XR CHEST AP PORTABLE  (CPT=71045)  Result Date: 4/24/2025  PROCEDURE:  XR CHEST AP PORTABLE  (CPT=71045)  TECHNIQUE:  AP chest radiograph was obtained.  COMPARISON:  [facility] , CT, CT ABDOMEN+PELVIS(CPT=74176), 4/23/2025, 11:20 PM.  [facility] , XR, XR CHEST AP/PA (1 VIEW) (CPT=71045), 12/02/2024, 4:11 PM.  [facility] , XR, XR CHEST AP PORTABLE  (CPT=71045), 12/01/2024, 10:18 PM.  INDICATIONS:  Verify correct tube placement  PATIENT STATED HISTORY: (As transcribed by Technologist)  NG placement                 CONCLUSION:  The NG tube tip is pointing to the left in the mid body of the stomach in good position. There is gaseous distension of the stomach.  There is some distension of the esophagus also noted.  The heart size and vascularity are normal.  The lungs are hyperinflated without definite pneumonia or mass.   LOCATION:  [id number]      Dictated by ([facility]): Peter Reynoso MD on 4/24/2025 at 7:04 AM     Finalized by ([facility]): Peter Reynoso MD on 4/24/2025 at 7:05 AM       CT ABDOMEN+PELVIS(CPT=74176)  Result Date:  4/24/2025  PROCEDURE:  CT ABDOMEN+PELVIS (CPT=74176)  COMPARISON:  [facility] , CT, CT ABDOMEN+PELVIS KIDNEYSTONE 2D RNDR(NO IV,NO ORAL)(CPT=74176), 12/01/2024, 8:32 PM.  INDICATIONS:  abd pain/vomiting hx of frequent obstruction  TECHNIQUE:  Unenhanced multislice CT scanning was performed from the dome of the diaphragm to the pubic symphysis.  Dose reduction techniques were used. Dose information is transmitted to the ACR (American College of Radiology) NRDR (National Radiology Data Registry) which includes the Dose Index Registry.  PATIENT STATED HISTORY: (As transcribed by Technologist)  Patient presents with generalized lower abdominal pain and vomiting, with a history of bowel obstruction.    FINDINGS:  Please note the exam is somewhat limited without intravenous or oral contrast.  LIVER:  Stable appearance of a probable cyst involving the left hepatic lobe measuring up to 10 mm. BILIARY:  Small gallstones are present within the gallbladder. PANCREAS:  No lesion, fluid collection, ductal dilatation, or atrophy.  SPLEEN:  No enlargement or focal lesion.  KIDNEYS:  A right nephrostomy tube is present in expected position.  No evidence of right hydronephrosis.  On the left there is severe left hydroureteronephrosis.  This is new from the prior exam. ADRENALS:  No mass or enlargement.  AORTA/VASCULAR:    Unremarkable as seen on non-contrast imaging. RETROPERITONEUM:  No mass or adenopathy.  BOWEL/MESENTERY:  Significantly limited assessment without intravenous or oral contrast.  Numerous postsurgical changes are present with an ostomy within the left lower quadrant.  Urinary bladder is not visualized and there is a presumed ileal conduit present.  Correlation with the history is suggested.  Distended loops of bowel are present with small bowel feces sign noted.  A representative loop of bowel within the right hemiabdomen measures up to the 5.8 cm.  There is some air present within nondependent loops of bowel which may  be due to debris.  Pneumatosis is possible but felt to be less likely.  Close clinical follow-up is recommended.  No free fluid is seen.  A discrete transition point is not clearly identified. ABDOMINAL WALL:  No mass or hernia.  URINARY BLADDER:  Urinary bladder is not visualized. PELVIC NODES:  No adenopathy.  PELVIC ORGANS:  Not visualized on this exam. BONES:  No bony lesion or fracture.  LUNG BASES:  No visible pulmonary or pleural disease.  OTHER:  Negative.             CONCLUSION:  Postsurgical changes of the bowel are noted.  There are numerous dilated loops of small bowel present suggestive of small-bowel obstruction.  A discrete transition point is not clearly identified although evaluation is limited without intravenous or oral contrast.  There is no free fluid seen and there is small bowel feces sign with debris which is suggestive of delayed transit.  Pneumatosis is difficult to entirely exclude on this exam.  Close clinical follow-up and correlation with lactic acid is suggested.  There is at least moderate left hydroureteronephrosis is present which is new.  There is a presumed ileal conduit and this may also be related to bowel obstruction.  Critical results were discussed with Dr. Panda by Dr. Montalvo at approximately 0008 on 4/24/25.  Read back was performed.    LOCATION:  [facility]   Dictated by (CST): Valdemar Montalvo MD on 4/24/2025 at 0:02 AM     Finalized by (CST): Valdemar Montalvo MD on 4/24/2025 at 0:09 AM             Impression:  Problem List[6]    75 year old female with history of HTN, HL, CKD, cervical cancer s/p hysterectomy and chemoradiation, SBO c/b enterotomy with end ostomy, atrophic L kidney s/p VV fistula sp complex reconstruction (cystectomy, cutaneous ureterostomy with trans U-U) and right hydronephrosis managed by nephrostomy tube, who presented to the ED for severe abdominal pain and vomiting. She was afebrile, temp 98.9 and hypertensive with /78 at time of presentation. Labs showed  white count 9.7 with left shift, serum creatinine 2.44 (baseline 1.2-1.4). BUN 24. CT scan with moderately distended small bowel loops within the lower abdomen, improved from prior imaging 4/23/25, in addition to new bilateral hydronephrosis (from 12/2024 imaging, although stable left from most recent imaging). Patient admitted for further evaluation and mgmt. Urology consulted.    Complex urologic history with current management requiring right nephrostomy tube. Worsening renal function and new bilateral hydronephrosis on imaging. Reviewed with patient findings and recommendation for exchange. She is agreeable to proceed.     Recommendations:  Remain NPO. IR right nephrostomy tube exchange today. Monitor renal function/UOP.     Interpretor [id number] Noni Flannery (Mandarin)    Thank you for allowing me to participate in the care of your patient.    Brigitte Dalton PA-C  [facility]  4/30/2025           [1]   Past Medical History:   Cancer (HCC)    cervicle    CKD (chronic kidney disease)    Exposure to medical diagnostic radiation    High blood pressure    High cholesterol    History of blood transfusion    Personal history of antineoplastic chemotherapy   [2]   Past Surgical History:  Procedure Laterality Date    Other surgical history      BLAADER REMOVAL    Part removal colon w end colostomy     [3]   Family History  Problem Relation Age of Onset    Stroke Father     Stroke Mother    [4]   Allergies  Allergen Reactions    Famotidine NAUSEA AND VOMITING   [5]   Current Facility-Administered Medications:     HYDROmorphone (Dilaudid) 1 MG/ML injection 0.5 mg, 0.5 mg, Intravenous, Q30 Min PRN    sodium chloride 0.9% infusion, , Intravenous, Continuous    acetaminophen (Tylenol Extra Strength) tab 500 mg, 500 mg, Oral, Q4H PRN    melatonin tab 3 mg, 3 mg, Oral, Nightly PRN    polyethylene glycol (PEG 3350) (Miralax) 17 g oral packet 17 g, 17 g, Oral, Daily PRN    sennosides (Senokot) tab 17.2 mg, 17.2 mg, Oral,  Nightly PRN    bisacodyl (Dulcolax) 10 MG rectal suppository 10 mg, 10 mg, Rectal, Daily PRN    ondansetron (Zofran) 4 MG/2ML injection 8 mg, 8 mg, Intravenous, Q6H PRN    benzonatate (Tessalon) cap 200 mg, 200 mg, Oral, TID PRN    glycerin-hypromellose- (Artificial Tears) 0.2-0.2-1 % ophthalmic solution 1 drop, 1 drop, Both Eyes, QID PRN    sodium chloride (Saline Mist) 0.65 % nasal solution 1 spray, 1 spray, Each Nare, Q3H PRN    HYDROmorphone (Dilaudid) 1 MG/ML injection 0.2 mg, 0.2 mg, Intravenous, Q2H PRN **OR** HYDROmorphone (Dilaudid) 1 MG/ML injection 0.4 mg, 0.4 mg, Intravenous, Q2H PRN    hydrALAzine (Apresoline) 20 mg/mL injection 10 mg, 10 mg, Intravenous, Q4H PRN    heparin (Porcine) 5000 UNIT/ML injection 5,000 Units, 5,000 Units, Subcutaneous, Q8H JENNYFER  [6]   Patient Active Problem List  Diagnosis    Hydronephrosis, right    Colostomy in place ([facility])    Stage 3a chronic kidney disease (HCC)    Elevated lipoprotein A level    Hyperlipidemia    Elevated blood pressure reading    Insomnia    Frequent diarrhea    Heart murmur    Thrombocytopenia    SBO (small bowel obstruction) ([facility])    Primary hypertension    Iron deficiency anemia secondary to inadequate dietary iron intake    Hypernatremia    JAMES (acute kidney injury)    Hydronephrosis due to obstruction of ureter

## 2025-04-30 NOTE — PROCEDURES
[facility]   part of [facility]  Procedure Note    Anmol Gonzalez Patient Status:  Inpatient    2/15/1950 MRN [id number]   Location [facility] 5NW-A Attending Edmundo Varela MD   Hosp Day # 1 PCP Jess Esquivel MD     Procedure: R perc neph tube exchange    Pre-Procedure Diagnosis:  Hydronephrosis    Post-Procedure Diagnosis: Same    Anesthesia:  Local and Sedation    Findings:  Existing R PCN exchanged for new 10.2f APD to R renal pelvis, excellent drainage of urine following procedure    Specimens: None    Blood Loss:  Minimal    Tourniquet Time: None  Complications:  None  Drains:  As above    Secondary Diagnosis:  None    Valdemar Montalvo MD  2025

## 2025-04-30 NOTE — PROGRESS NOTES
Full consult to follow.  The patient's daughter was contacrted via telephone and serving as  as the patient is primarily Chinese speaking  Unable to see patient earlier as she was in radiology for nephrostomy tube change  74-year-old female with complex past medical-surgical history.  Patient is known to Dr. Landin.  Patient was recently discharged for small bowel obstruction.  She presents to the emergency department overnight with recurrent abdominal pain.  Workup in the emergency department revealed multiple electrolyte abnormalities, leukocytosis 19,000 this morning, hemoglobin 11.5, platelet count 117, urine analysis positive for UTI.  CT scan abdomen pelvis reveals moderately distended small bowel in the lower abdomen though the degree of bowel distention is improved when compared to imaging from April 23.  NG tube was placed and the patient was admitted  Patient underwent nephrostomy tube change earlier today  On physical examination the abdomen is soft, mildly distended, nontender to deep palpation in all 4 quadrants.  Ostomy in place.  Treatment options were reviewed in detail with the patient as well as her daughter at the bedside.  At this time the recommendation is to proceed with nonoperative medical management including IV hydration, bowel rest, NG tube decompression, serial abdominal examination, follow-up serology, symptomatic management of pain and nausea.  The patient is encouraged to ambulate and may have hard candy and gum.  We did discuss the possible need for surgical intervention should medical management be unsuccessful.  The patient and her daughter have no further questions at this time and wishes to proceed with proposed treatment plan.      LOVE Brennan MD, FACS    Please note that this report has been produced using speech recognition software and may contain errors related to that system including but not limited to errors in grammar, punctuation and spelling as well as words and  phrases that possibly may have been recognized inappropriately.  If there are any questions or concerns please contact the dictating provider for clarification.  The 21st Century Cures Act makes medical notes like these available to patients in the interest of transparency. Please be advised this is a medical document. Medical documents are intended to carry relevant information, facts as evident, and the clinical opinions. The medical note is intended as peer to peer communication and may appear blunt or direct. It is written in medical language and may contain abbreviations or verbiage that are unfamiliar.  If there are any questions or concerns please contact the dictating provider for clarification.

## 2025-04-30 NOTE — H&P
[facility]     Anmol Gonzalez Patient Status:  Emergency    2/15/1950 MRN [id number]   Location [facility] EMERGENCY DEPARTMENT Attending Belle Weir, *   Hosp Day # 0 PCP Jess Esquivel MD     Chief Complaint: abd pain    Subjective:    History of Present Illness:     Anmol Gonzalez is a 75 year old female was just in the hospital for small bowel obstruction and discharged after NG tube was removed and tolerated a diet.  She was discharged 2 days ago but then had reoccurrence of pain in her stomach diffusely this morning.  Along with intractable retching.  Last time her ostomy was changed was last night with stool removed.     History/Other:    Past Medical History:  Past Medical History[1]  Past Surgical History:   Past Surgical History[2]   Family History:   Family History[3]    hypertension Social History:    reports that she has never smoked. She has never been exposed to tobacco smoke. She has never used smokeless tobacco. She reports that she does not drink alcohol and does not use drugs.     Allergies: Allergies[4]    Medications:  Medications Ordered Prior to Encounter[5]    Review of Systems:   A comprehensive 12 point review of systems was completed.    Pertinent positives and negatives noted in the HPI.    Objective:   Physical Exam:    BP (!) 195/68   Pulse 69   Temp 98.9 °F (37.2 °C) (Oral)   Resp 16   Ht 5' 1\" (1.549 m)   Wt 90 lb (40.8 kg)   SpO2 100%   BMI 17.01 kg/m²   General: No acute distress, Alert  Respiratory: No rhonchi, no wheezes  Cardiovascular: S1, S2. Regular rate and rhythm  Abdomen: tender diffusely; mild distention  Neuro: No new focal deficits  Extremities: No edema      Results:    Labs:      Labs Last 24 Hours:    Recent Labs   Lab 25  2213 25  0742 25  0748   RBC 4.00 3.12* 3.05*   HGB 12.9 10.0* 9.7*   HCT 37.0 30.8* 29.7*   MCV 92.5 98.7 97.4   MCH 32.3 32.1 31.8   MCHC 34.9 32.5 32.7   RDW 12.7 13.9 13.9    NEPRELIM 5.22 2.86  --    WBC 7.4 4.0 5.0   .0 140.0* 139.0*       Recent Labs   Lab 04/23/25  2213 04/25/25  0742 04/26/25  0748 04/27/25  0319   * 69* 138* 97   BUN 27* 17 14 10   CREATSERUM 1.58* 1.34* 1.34* 1.25*   EGFRCR 34* 42* 42* 46*   CA 10.7* 8.6* 9.1 8.7   ALB 5.0*  --   --   --     149* 149* 144   K 4.1 3.4* 3.4*  3.4* 3.2*  3.2*    114* 117* 112   CO2 22.0 18.0* 23.0 24.0   ALKPHO 59  --   --   --    AST 76*  --   --   --    *  --   --   --    BILT 0.9  --   --   --    TP 8.2  --   --   --        Lab Results   Component Value Date    INR 1.0 02/13/2025    INR 1.0 11/14/2024    INR 1.0 08/14/2024       No results for input(s): \"TROP\", \"TROPHS\", \"CK\" in the last 168 hours.    No results for input(s): \"TROP\", \"PBNP\" in the last 168 hours.    No results for input(s): \"PCT\" in the last 168 hours.    Imaging: Imaging data reviewed in Epic.    Assessment & Plan:      #suspected recurrent SBO; CT pending; surgery consult; IV fluids; NPO; will probably need NG again    #hx cervical cancer s/p hysterectomy; s/p chemoradiation; hx SBO c/b enterotomy; now with end ostomy and nephrostomy tube on right (has this exchanged periodically but is essential and chronic for kidney function as she has no other UOP).    #HTN    #HL    Repeat labs pending    Addendum: new hydroureteronephrosis on right; urology consult; nephrostomy tube exchange tomorrow.   NG tube recommended by surgery.  To be placed in ER.    JAMES: iv fluids;  IR order placed for nephrostomy tube exchange.      Hold all home meds for now    Quality:  DVT prophylaxis:  SCDs, Lovenox  Code Status: see chart  Pratt: NO  Pratt Duration (in days): N/A  Central line: NO  Estimated discharge date: tbd    Plan of care discussed with patient and ER MD Bobby Valerio MD    Supplementary Documentation:                                     [1]   Past Medical History:   Cancer (HCC)    cervicle    CKD (chronic kidney disease)   [2]    Past Surgical History:  Procedure Laterality Date    Other surgical history      BLAADER REMOVAL    Part removal colon w end colostomy     [3]   Family History  Problem Relation Age of Onset    Stroke Father     Stroke Mother    [4]   Allergies  Allergen Reactions    Famotidine NAUSEA AND VOMITING   [5]   Current Facility-Administered Medications on File Prior to Encounter   Medication Dose Route Frequency Provider Last Rate Last Admin    [COMPLETED] potassium chloride (Klor-Con M20) tab 40 mEq  40 mEq Oral Once Carlo Aparicio, DO   40 mEq at 04/27/25 0531    [COMPLETED] potassium chloride 40 mEq in 250mL sodium chloride 0.9% IVPB premix  40 mEq Intravenous Once MartinezRacquel mancia, DO 62.5 mL/hr at 04/26/25 1058 40 mEq at 04/26/25 1058    [COMPLETED] potassium chloride 40 mEq in 250mL sodium chloride 0.9% IVPB premix  40 mEq Intravenous Once MartinezRacquel mancia, DO 62.5 mL/hr at 04/25/25 1016 40 mEq at 04/25/25 1016    [COMPLETED] sodium chloride 0.9 % IV bolus 500 mL  500 mL Intravenous Once Erik Panda MD   Stopped at 04/23/25 2331    [COMPLETED] ondansetron (Zofran) 4 MG/2ML injection 4 mg  4 mg Intravenous Once Erik Panda MD   4 mg at 04/23/25 2156    [COMPLETED] lidocaine (Xylocaine) 1 % injection             [COMPLETED] fentaNYL (Sublimaze) 50 mcg/mL injection             [COMPLETED] midazolam (Versed) 2 MG/2ML injection             [COMPLETED] ceFAZolin (Ancef) 1 g injection             [COMPLETED] iopamidol (ISOVUE-M 300) 61 % injection 30 mL  30 mL Injection ONCE PRN Byron Brian MD   10 mL at 02/13/25 1145     Current Outpatient Medications on File Prior to Encounter   Medication Sig Dispense Refill    loperamide 2 MG Oral Cap Take 1 capsule (2 mg total) by mouth as needed.      metoprolol succinate ER 25 MG Oral Tablet 24 Hr Take 1 tablet (25 mg total) by mouth in the morning.      Zolpidem Tartrate ER 6.25 MG Oral Tab CR Take 1 tablet (6.25 mg total) by mouth nightly as needed for Sleep.  90 tablet 0    TRAZODONE 50 MG Oral Tab Take 1 tablet (50 mg total) by mouth nightly. 90 tablet 0    rosuvastatin 5 MG Oral Tab Take 1 tablet (5 mg total) by mouth in the morning.      Ferrous Sulfate 325 (65 Fe) MG Oral Tab Take 1 tablet (325 mg total) by mouth daily with breakfast. 90 tablet 0    Ascorbic Acid (VITAMIN C OR) Take by mouth in the morning.      Coenzyme Q10 100 MG Oral Cap Take 300 mg by mouth in the morning.      omega-3 fatty acids 1000 MG Oral Cap Take 1,000 mg by mouth in the morning.      sodium bicarbonate 650 MG Oral Tab Take 1 tablet (650 mg total) by mouth in the morning and 1 tablet (650 mg total) before bedtime.

## 2025-04-30 NOTE — H&P
[facility]   part of [facility]   History & Physical    Anmol Gonzalez Patient Status:  Inpatient    2/15/1950 MRN [id number]   Location [facility] 5NW-A Attending Edmundo Varela MD   Hosp Day # 1 PCP eJss Esquivel MD     Admitting Diagnosis:   Hydronephrosis    History of Present Illness:   76 yo F complex anatomy including ureters anastamosed surgically, now with hydronephrosis and prior noted R perc neph tube.  Presents with abd pain, worsening renal function and request for exchange of tube.      History   Past Medical History:  Past Medical History[1]    Past Surgical History:  Past Surgical History[2]    Social History:  Social History     Tobacco Use    Smoking status: Never     Passive exposure: Never    Smokeless tobacco: Never   Substance Use Topics    Alcohol use: Never        Family History:  Family History[3]    Allergies/Medications:   Allergies:  Allergies[4]    Medications:  Medications - Current[5]    Physical Exam & Review of Systems:   Physical Exam:    /54 (BP Location: Left arm)   Pulse 74   Temp 97.6 °F (36.4 °C) (Oral)   Resp 17   Ht 61\"   Wt 102 lb 1.2 oz   SpO2 98%   BMI 19.29 kg/m²     General: NAD  Neck: No JVD  Lungs: CTA bilat  Heart: RRR, S1, S2  Abdomen: Soft, NT/ND, BS+x4  R flank PCN c/d/i    Results:   Labs:  Recent Labs   Lab 25  2213 25  0742 25  0748 25  2033 25  0641   RBC 4.00 3.12* 3.05* 3.84 3.51*   HGB 12.9 10.0* 9.7* 12.3 11.5*   HCT 37.0 30.8* 29.7* 36.5 35.5   MCV 92.5 98.7 97.4 95.1 101.1*   MCH 32.3 32.1 31.8 32.0 32.8   MCHC 34.9 32.5 32.7 33.7 32.4   RDW 12.7 13.9 13.9 13.2 13.8   NEPRELIM 5.22 2.86  --  9.03*  --    WBC 7.4 4.0 5.0 9.7 19.0*   .0 140.0* 139.0* 146.0* 117.0*     No results for input(s): \"PTP\", \"INR\", \"PTT\" in the last 168 hours.  Recent Labs   Lab 25  0641   GLU 97 81 70   BUN 10 24* 28*   CREATSERUM 1.25* 2.44* 3.32*   CA 8.7 9.5 8.3*    142 143    K 3.2*  3.2* 4.3 4.5    111 114*   CO2 24.0 18.0* 13.0*       Assessment/Plan:   Impression: 74 yo F bilateral hydro, ureteral anastamosis with possible malfunctioning catheter.  D/w Dr. Mccormick    I have discussed with the patient and/or legal representative the potential benefits, risks, and side effects of this procedure, the likelihood of the patient achieving goals; and the potential problems that might occur during recuperation.  I discussed reasonable alternatives to the procedure, including risks, benefits and side effects related to the alternatives, and risks related to not receiving this procedure.      Recommendations: Exchange of R perc neph tube    Valdemar Montalvo MD  4/30/2025  4:36 PM           [1]   Past Medical History:   Cancer (HCC)    cervicle    CKD (chronic kidney disease)    Exposure to medical diagnostic radiation    High blood pressure    High cholesterol    History of blood transfusion    Personal history of antineoplastic chemotherapy   [2]   Past Surgical History:  Procedure Laterality Date    Other surgical history      BLAADER REMOVAL    Part removal colon w end colostomy     [3]   Family History  Problem Relation Age of Onset    Stroke Father     Stroke Mother    [4]   Allergies  Allergen Reactions    Famotidine NAUSEA AND VOMITING   [5] No current outpatient medications on file.

## 2025-05-01 PROBLEM — Z85.41 HISTORY OF CERVICAL CANCER: Status: ACTIVE | Noted: 2025-05-01

## 2025-05-01 LAB
ANION GAP SERPL CALC-SCNC: 14 MMOL/L (ref 0–18)
ANION GAP SERPL CALC-SCNC: 15 MMOL/L (ref 0–18)
BUN BLD-MCNC: 33 MG/DL (ref 9–23)
BUN BLD-MCNC: 37 MG/DL (ref 9–23)
CALCIUM BLD-MCNC: 8.4 MG/DL (ref 8.7–10.6)
CALCIUM BLD-MCNC: 8.6 MG/DL (ref 8.7–10.6)
CHLORIDE SERPL-SCNC: 113 MMOL/L (ref 98–112)
CHLORIDE SERPL-SCNC: 114 MMOL/L (ref 98–112)
CO2 SERPL-SCNC: 18 MMOL/L (ref 21–32)
CO2 SERPL-SCNC: 19 MMOL/L (ref 21–32)
CREAT BLD-MCNC: 2.6 MG/DL (ref 0.55–1.02)
CREAT BLD-MCNC: 2.94 MG/DL (ref 0.55–1.02)
EGFRCR SERPLBLD CKD-EPI 2021: 16 ML/MIN/1.73M2 (ref 60–?)
EGFRCR SERPLBLD CKD-EPI 2021: 19 ML/MIN/1.73M2 (ref 60–?)
ERYTHROCYTE [DISTWIDTH] IN BLOOD BY AUTOMATED COUNT: 13.7 %
GLUCOSE BLD-MCNC: 82 MG/DL (ref 70–99)
GLUCOSE BLD-MCNC: 90 MG/DL (ref 70–99)
HCT VFR BLD AUTO: 28 % (ref 35–48)
HGB BLD-MCNC: 9.5 G/DL (ref 12–16)
MAGNESIUM SERPL-MCNC: 1.6 MG/DL (ref 1.6–2.6)
MCH RBC QN AUTO: 32.2 PG (ref 26–34)
MCHC RBC AUTO-ENTMCNC: 33.9 G/DL (ref 31–37)
MCV RBC AUTO: 94.9 FL (ref 80–100)
OSMOLALITY SERPL CALC.SUM OF ELEC: 310 MOSM/KG (ref 275–295)
OSMOLALITY SERPL CALC.SUM OF ELEC: 310 MOSM/KG (ref 275–295)
PLATELET # BLD AUTO: 96 10(3)UL (ref 150–450)
PLATELETS.RETICULATED NFR BLD AUTO: 3.9 % (ref 0–7)
POTASSIUM SERPL-SCNC: 3.6 MMOL/L (ref 3.5–5.1)
POTASSIUM SERPL-SCNC: 4.3 MMOL/L (ref 3.5–5.1)
RBC # BLD AUTO: 2.95 X10(6)UL (ref 3.8–5.3)
SODIUM SERPL-SCNC: 146 MMOL/L (ref 136–145)
SODIUM SERPL-SCNC: 147 MMOL/L (ref 136–145)
WBC # BLD AUTO: 14.5 X10(3) UL (ref 4–11)

## 2025-05-01 PROCEDURE — 99233 SBSQ HOSP IP/OBS HIGH 50: CPT | Performed by: HOSPITALIST

## 2025-05-01 PROCEDURE — 99231 SBSQ HOSP IP/OBS SF/LOW 25: CPT | Performed by: PHYSICIAN ASSISTANT

## 2025-05-01 PROCEDURE — 99232 SBSQ HOSP IP/OBS MODERATE 35: CPT | Performed by: SURGERY

## 2025-05-01 RX ORDER — MAGNESIUM SULFATE HEPTAHYDRATE 40 MG/ML
2 INJECTION, SOLUTION INTRAVENOUS ONCE
Status: COMPLETED | OUTPATIENT
Start: 2025-05-01 | End: 2025-05-01

## 2025-05-01 NOTE — PLAN OF CARE
Patient AAOx4, mandarin speaking,  utilized. Patient on room air, saturating WNL. Tele-NSR. Denies pain and nausea. NG to LIS, output per flowsheets. NPO except ice chips/gum/hard candy for SBO. Continuous IV fluids. IV antibiotics for UTI. Electrolytes replaced. Standby assist, walked in halls off suction per surgery recommendation, tolerated well. Nephrostomy draining well. Colostomy with gas in bag. Call light within reach. Patient updated on POC, questions answered, understanding verbalized.     Problem: Patient/Family Goals  Goal: Patient/Family Long Term Goal  Description: Patient's Long Term Goal: discharge with adequate resources  Interventions:  - follow poc  -see consults  -IV fulids, IV abx  -NG tube to LIS  - See additional Care Plan goals for specific interventions  Outcome: Progressing  Goal: Patient/Family Short Term Goal  Description: Patient's Short Term Goal:   5/1: adequate pain and nausea control    Interventions:   - IV pain and nausea medication as needed  -assess pain and nausea  -IV fluids  - See additional Care Plan goals for specific interventions  Outcome: Progressing     Problem: PAIN - ADULT  Goal: Verbalizes/displays adequate comfort level or patient's stated pain goal  Description: INTERVENTIONS:- Encourage pt to monitor pain and request assistance- Assess pain using appropriate pain scale- Administer analgesics based on type and severity of pain and evaluate response- Implement non-pharmacological measures as appropriate and evaluate response- Consider cultural and social influences on pain and pain management- Manage/alleviate anxiety- Utilize distraction and/or relaxation techniques- Monitor for opioid side effects- Notify MD/LIP if interventions unsuccessful or patient reports new pain- Anticipate increased pain with activity and pre-medicate as appropriate  Outcome: Progressing     Problem: SAFETY ADULT - FALL  Goal: Free from fall injury  Description: INTERVENTIONS:-  Assess pt frequently for physical needs- Identify cognitive and physical deficits and behaviors that affect risk of falls.- [place] fall precautions as indicated by assessment.- Educate pt/family on patient safety including physical limitations- Instruct pt to call for assistance with activity based on assessment- Modify environment to reduce risk of injury- Provide assistive devices as appropriate- Consider OT/PT consult to assist with strengthening/mobility- Encourage toileting schedule  Outcome: Progressing     Problem: Altered Communication/Language Barrier  Goal: Patient/Family is able to understand and participate in their care  Description: Interventions:- Assess communication ability and preferred communication style- Implement communication aides and strategies- Use visual cues when possible- Listen attentively, be patient, do not interrupt- Minimize distractions- Allow time for understanding and response- Establish method for patient to ask for assistance (call light)- Provide an  as needed- Communicate barriers and strategies to overcome with those who interact with patient  Outcome: Progressing     Problem: GASTROINTESTINAL - ADULT  Goal: Minimal or absence of nausea and vomiting  Description: INTERVENTIONS:- Maintain adequate hydration with IV or PO as ordered and tolerated- Nasogastric tube to low intermittent suction as ordered- Evaluate effectiveness of ordered antiemetic medications- Provide nonpharmacologic comfort measures as appropriate- Advance diet as tolerated, if ordered- Obtain nutritional consult as needed- Evaluate fluid balance  Outcome: Progressing  Goal: Maintains or returns to baseline bowel function  Description: INTERVENTIONS:- Assess bowel function- Maintain adequate hydration with IV or PO as ordered and tolerated- Evaluate effectiveness of GI medications- Encourage mobilization and activity- Obtain nutritional consult as needed- Establish a toileting routine/schedule-  Consider collaborating with pharmacy to review patient's medication profile  Outcome: Progressing     Problem: METABOLIC/FLUID AND ELECTROLYTES - ADULT  Goal: Electrolytes maintained within normal limits  Description: INTERVENTIONS:- Monitor labs and rhythm and assess patient for signs and symptoms of electrolyte imbalances- Administer electrolyte replacement as ordered- Monitor response to electrolyte replacements, including rhythm and repeat lab results as appropriate- Fluid restriction as ordered- Instruct patient on fluid and nutrition restrictions as appropriate  Outcome: Progressing  Goal: Hemodynamic stability and optimal renal function maintained  Description: INTERVENTIONS:- Monitor labs and assess for signs and symptoms of volume excess or deficit- Monitor intake, output and patient weight- Monitor urine specific gravity, serum osmolarity and serum sodium as indicated or ordered- Monitor response to interventions for patient's volume status, including labs, urine output, blood pressure (other measures as available)- Encourage oral intake as appropriate- Instruct patient on fluid and nutrition restrictions as appropriate  Outcome: Progressing

## 2025-05-01 NOTE — PLAN OF CARE
Pt is A&Ox4, mandrin speaking.. used.  VSS, afebrile and denies any pain. SPO2 maintained on room air. Continuous pulse ox. Denies any SOB, cough. Tele/NS.  Heparin. NPO. NG in right nare to LIS. Nauseas managed with prn meds. Right nephrostomy. LUQ colostomy.  Up with Standby. IV fluids. Safety precautions in place.  Pt is updated with plan of care.    Problem: Patient/Family Goals  Goal: Patient/Family Long Term Goal  Description: Patient's Long Term Goal: discharge  Interventions:- follow poc  - See additional Care Plan goals for specific interventions  Outcome: Progressing  Goal: Patient/Family Short Term Goal  Description: Patient's Short Term Goal:   4/29 NOC: rest, sleep  4/30AM: nephrostomy replacement  4/30 noc: manage nausea    Interventions: - - See additional Care Plan goals for specific interventions  Outcome: Progressing

## 2025-05-01 NOTE — PROGRESS NOTES
[facility]   part of [facility]    Progress Note    Anmol Gonzalez Patient Status:  Inpatient    2/15/1950 MRN [id number]   Location [facility] 5NW-A Attending Edmundo Varela MD   Hosp Day # 2 PCP Jess Esquivel MD     Subjective:   Anmol Gonzalez is a(n) 75 year old female     S/p R NT exchange     Objective:   Blood pressure (!) 164/70, pulse 58, temperature 98.9 °F (37.2 °C), temperature source Axillary, resp. rate 19, height 5' 1\" (1.549 m), weight 102 lb 1.2 oz (46.3 kg), SpO2 97%, not currently breastfeeding.    Right NT draining clear yellow  NGT in place    Results:   Lab Results   Component Value Date    WBC 19.0 (H) 2025    HGB 11.5 (L) 2025    HCT 35.5 2025    .0 (L) 2025    CREATSERUM 2.94 (H) 2025    BUN 37 (H) 2025     (H) 2025    K 3.6 2025     (H) 2025    CO2 18.0 (L) 2025    GLU 90 2025    CA 8.4 (L) 2025    ALB 4.3 2025    ALKPHO 51 (L) 2025    BILT 1.1 2025    TP 7.2 2025    AST 25 2025    ALT 33 2025    INR 1.0 2025    LIP 68 (H) 2025    MG 1.6 2025    PHOS 1.9 (L) 2024    B12 476 2025       IR NEPHROSTOMY TUBE  Result Date: 2025  CONCLUSION:  Technically successful exchange of the right percutaneous nephrostomy tube.   LOCATION:  [facility]    Dictated by (CST): Valdemar Montalvo MD on 2025 at 4:50 PM     Finalized by (CST): Valdemar Montalvo MD on 2025 at 4:52 PM       XR CHEST AP PORTABLE  (CPT=71045)  Result Date: 2025  CONCLUSION:   Enteric catheter terminates in the expected location of the gastric body/fundus.  Heart size within normal limits.  Patchy left basilar/retrocardiac opacity may be atelectatic or inflammatory.  No significant pleural effusion or appreciable pneumothorax.   LOCATION:  [facility]      Dictated by (CST): Micaela Crawford MD on 2025 at 11:01 PM     Finalized by (CST): Micaela Crawford MD on 2025 at  11:02 PM       CT ABDOMEN+PELVIS(CPT=74176)  Result Date: 4/29/2025  CONCLUSION:   1. 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  Right-sided nephroureteral stent in place, though there is moderate/severe hydroureteronephrosis bilaterally, stable on the left and new on the right compared to 04/23/2025.  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  2. Postoperative changes of the bowel with left mid abdominal colostomy noted.  There continue to be moderately distended small bowel loops within the lower abdomen and pelvis, though the degree of bowel distention is improved compared to 04/23/2025 indicating improving ileus or obstruction.   LOCATION:  [facility]   Dictated by (CST): Micaela Crawford MD on 4/29/2025 at 9:44 PM     Finalized by (CST): Micaela Crawford MD on 4/29/2025 at 9:54 PM             Assessment & Plan:   Bilateral hydronephrosis  Complex  history with reconstruction with cutaneous ureterostomy with trans U-U, managed with right nephrostomy tube  S/p R NT exchange 4/30/25 by IR  Excellent UOP following NT exchange  Scr trending down  Urine culture pending    SBO  Mgmt per primary/gen surgery    PLAN: Continue R NT. Continue to trend SCR and monitor UOP. Await final culture.      Brigitte Dalton PA-C  [facility]  5/1/2025

## 2025-05-01 NOTE — PROGRESS NOTES
[facility]   part of [facility]     Hospitalist Progress Note     Anmol Gonzalez Patient Status:  Inpatient    2/15/1950 MRN [id number]   [facility] 5NW-A Attending Edmundo Varela MD   Hosp Day # 2 PCP Jess Esquivel MD     Chief Complaint: abd pain    Subjective:     Patient feels better today    Objective:    Review of Systems:   ROS completed; pertinent positive and negatives stated in subjective.    Vital signs:  Temp:  [97.8 °F (36.6 °C)-98.9 °F (37.2 °C)] 98.3 °F (36.8 °C)  Pulse:  [58-78] 62  Resp:  [18-23] 18  BP: (125-173)/(44-70) 159/66  SpO2:  [97 %-99 %] 97 %    Physical Exam:    General: No acute distress  Respiratory: Clear to auscultation bilaterally  Cardiovascular: S1, S2.  Abdomen: Soft, ostomy in place, nephrostomy tube in place  Neuro: No new focal deficits      Diagnostic Data:    Labs:  Recent Labs   Lab 25  0742 25  0748 25  0641 25  0646   WBC 4.0 5.0 9.7 19.0* 14.5*   HGB 10.0* 9.7* 12.3 11.5* 9.5*   MCV 98.7 97.4 95.1 101.1* 94.9   .0* 139.0* 146.0* 117.0* 96.0*       Recent Labs   Lab 25  0641 25  0646   GLU 81 70 90   BUN 24* 28* 37*   CREATSERUM 2.44* 3.32* 2.94*   CA 9.5 8.3* 8.4*   ALB 4.3  --   --     143 146*   K 4.3 4.5 3.6    114* 114*   CO2 18.0* 13.0* 18.0*   ALKPHO 51*  --   --    AST 25  --   --    ALT 33  --   --    BILT 1.1  --   --    TP 7.2  --   --        Estimated Glomerular Filtration Rate: 16 mL/min/1.73m2 (A) (result from lab).     No results for input(s): \"TROP\", \"TROPHS\", \"CK\" in the last 168 hours.    No results for input(s): \"PTP\", \"INR\" in the last 168 hours.           Imaging: Imaging data reviewed in Epic.    Medications: Scheduled Medications[1]    Assessment & Plan:     #SBO  NPO  IVF  NGT to LIS    #Right hydroureteronephrosis  Nephrostomy tube exchanged    #? Pyelo  U/a noted  UCX pending  Rocephin for now    #JAMES  IVF  Right Nephrostomy tube  exchanged  Slowly improving  May need repeat imaging if not improving though UOP much improved    #Metabolic Acidosis  IVF adjusted  monitor    #HTN  IV BB for now    #HLD  statin    Dispo: as above. UOP much improved.  Cont. Gentle fluid hydration. Monitor renal function and labs closely.  ABX pending UCX.  Abd exam better.  Some gas in ostomy.  NGT to MARGARITA Varela MD    Supplementary Documentation:     Quality:    DVT Mechanical Prophylaxis:   SCDs,    DVT Pharmacologic Prophylaxis   Medication    heparin (Porcine) 5000 UNIT/ML injection 5,000 Units                Code Status: Not on file  Pratt: No urinary catheter in place  Pratt Duration (in days):   Central line:    SHELLEY:       Discharge is dependent on: clinical stability  At this point Ms. Gonzalez is expected to be discharge to: home    The 21st Century Cures Act makes medical notes like these available to patients in the interest of transparency. Please be advised this is a medical document. Medical documents are intended to carry relevant information, facts as evident, and the clinical opinion of the practitioner. The medical note is intended as peer to peer communication and may appear blunt or direct. It is written in medical language and may contain abbreviations or verbiage that are unfamiliar.              **Certification      PHYSICIAN Certification of Need for Inpatient Hospitalization - Initial Certification    Patient will require inpatient services that will reasonably be expected to span two midnight's based on the clinical documentation in H+P.   Based on patients current state of illness, I anticipate that, after discharge, patient will require TBD.         [1]    potassium chloride  40 mEq Intravenous Once    pantoprazole  40 mg Intravenous Q24H    metoprolol  5 mg Intravenous Q6H    heparin  5,000 Units Subcutaneous Q8H JENNYFER

## 2025-05-01 NOTE — PROGRESS NOTES
[facility]  Progress Note    Anmol Gonzalez Patient Status:  Inpatient    2/15/1950 MRN [id number]   Location [facility] 5NW-A Attending Edmundo Varela MD   Hosp Day # 2 PCP Jess Esquivel MD     Subjective:  She is seen and examined resting in bed. As patient is primarily mandarin speaking,  alcides # [id number] used throughout this encounter. She reports feeling well today. She denies abdominal pain. She denies nausea or vomiting. She reports having gas output from her ostomy. She denies stool output. NG tube with 50 mL of output.     WBC 19.0 --> 14.5. Hemoglobin 11.5--> 9.5.   Objective/Physical Exam:  /66   Pulse 62   Temp 98.3 °F (36.8 °C) (Axillary)   Resp 18   Ht 5' 1\" (1.549 m)   Wt 102 lb 1.2 oz (46.3 kg)   SpO2 97%   BMI 19.29 kg/m²     Intake/Output Summary (Last 24 hours) at 2025 1237  Last data filed at 2025 1102  Gross per 24 hour   Intake --   Output 2400 ml   Net -2400 ml         General: Awake, Alert, Cooperative.  No apparent distress.  HEENT: Normocephalic, atraumatic. NG tube in place with Dark bilious output.   Pulm: no respiratory distress, no increased work of breathing  Abdomen:  Soft, non-distended,Non-tender to palpation, with no rebound or guarding.  No peritoneal signs. Ostomy to left abdomen with gas in appliance. No stool output.   Skin: warm, dry. No jaundice.     Labs:  Lab Results   Component Value Date    WBC 14.5 2025    HGB 9.5 2025    HCT 28.0 2025    PLT 96.0 2025      Lab Results   Component Value Date    INR 1.0 2025    INR 1.0 2024    INR 1.0 2024     Lab Results   Component Value Date     2025    K 3.6 2025     2025    CO2 18.0 2025    BUN 37 2025    CREATSERUM 2.94 2025    GLU 90 2025    CA 8.4 2025            Assessment:  Problem List[1]    Small bowel obstruction     Plan:  No plan for acute surgical intervention  Continue NG tube to low  intermittent suction. Okay to Clamp NG while patient ambulates on the floor  Continue NPO; okay for hard candy and small amount of ice chips for comfort  Analgesics and antiemetics as needed  Encourage ambulation and up to chair  Management of nephrostomy tube per urology recommendations.   Medical management per primary.   DVT prophylaxis- with heparin  GI prophylaxis- Add protonix    Xiomara Chavez PA-C  5/1/2025  12:37 PM    Patient is interviewed with the assistance of language line  ID number [id number] as the patient herself is primarily Mandarin speaking  The patient denies abdominal pain.  Again she reports gagging sensation due to NG tube.  NG tube output is minimal 100 cc over past 24 hours.  There is no evidence of fecal output in the ostomy however there is flatus.  Abdominal examination-soft, nondistended, nontender to deep palpation in all 4 quadrants  No evidence for surgical intervention at this time.  The patient will continue NG tube decompression today and if she continues to do well, would consider clamp trial tomorrow.  The patient is comfortable with these recommendations.  She has no further questions at this time.    I agree with the note above and attest to its accuracy with the following changes below after my interview and examination of the patient    The patient was seen and examined.  Available labs and radiology is noted.    Althea Brennan MD FACS    Please note that this report has been produced using speech recognition software and may contain errors related to that system including but not limited to errors in grammar, punctuation and spelling as well as words and phrases that possibly may have been recognized inappropriately.  If there are any questions or concerns please contact the dictating provider for clarification.    The 21st Century Cures Act makes medical notes like these available to patients in the interest of trans parency. Please be advised this is a  medical document. Medical documents are intended to carry relevant information, facts as evident, and the clinical opinion of the practitioner. The medical note is intended as peer to peer communication and may appear blunt or direct. It is written in medical language and may contain abbreviations or verbiage that are unfamiliar.   Again if there are any questions or concerns please contact the dictating provider for clarification.                [1]   Patient Active Problem List  Diagnosis    Hydronephrosis, right    Colostomy in place (HCC)    Stage 3a chronic kidney disease (HCC)    Elevated lipoprotein A level    Hyperlipidemia    Elevated blood pressure reading    Insomnia    Frequent diarrhea    Heart murmur    Thrombocytopenia    SBO (small bowel obstruction) (HCC)    Primary hypertension    Iron deficiency anemia secondary to inadequate dietary iron intake    Hypernatremia    JAMES (acute kidney injury)    Hydronephrosis due to obstruction of ureter

## 2025-05-02 LAB
ANION GAP SERPL CALC-SCNC: 16 MMOL/L (ref 0–18)
BUN BLD-MCNC: 29 MG/DL (ref 9–23)
CALCIUM BLD-MCNC: 8.8 MG/DL (ref 8.7–10.6)
CHLORIDE SERPL-SCNC: 113 MMOL/L (ref 98–112)
CO2 SERPL-SCNC: 19 MMOL/L (ref 21–32)
CREAT BLD-MCNC: 2.25 MG/DL (ref 0.55–1.02)
EGFRCR SERPLBLD CKD-EPI 2021: 22 ML/MIN/1.73M2 (ref 60–?)
ERYTHROCYTE [DISTWIDTH] IN BLOOD BY AUTOMATED COUNT: 13.9 %
GLUCOSE BLD-MCNC: 97 MG/DL (ref 70–99)
HCT VFR BLD AUTO: 30.3 % (ref 35–48)
HGB BLD-MCNC: 10.3 G/DL (ref 12–16)
MAGNESIUM SERPL-MCNC: 2.3 MG/DL (ref 1.6–2.6)
MCH RBC QN AUTO: 32.4 PG (ref 26–34)
MCHC RBC AUTO-ENTMCNC: 34 G/DL (ref 31–37)
MCV RBC AUTO: 95.3 FL (ref 80–100)
OSMOLALITY SERPL CALC.SUM OF ELEC: 312 MOSM/KG (ref 275–295)
PLATELET # BLD AUTO: 124 10(3)UL (ref 150–450)
POTASSIUM SERPL-SCNC: 3.8 MMOL/L (ref 3.5–5.1)
POTASSIUM SERPL-SCNC: 3.8 MMOL/L (ref 3.5–5.1)
RBC # BLD AUTO: 3.18 X10(6)UL (ref 3.8–5.3)
SODIUM SERPL-SCNC: 148 MMOL/L (ref 136–145)
WBC # BLD AUTO: 13.4 X10(3) UL (ref 4–11)

## 2025-05-02 PROCEDURE — 99231 SBSQ HOSP IP/OBS SF/LOW 25: CPT

## 2025-05-02 PROCEDURE — 99232 SBSQ HOSP IP/OBS MODERATE 35: CPT | Performed by: HOSPITALIST

## 2025-05-02 RX ORDER — DEXTROSE MONOHYDRATE 50 MG/ML
INJECTION, SOLUTION INTRAVENOUS CONTINUOUS
Status: DISCONTINUED | OUTPATIENT
Start: 2025-05-02 | End: 2025-05-03

## 2025-05-02 RX ORDER — SODIUM CHLORIDE, SODIUM LACTATE, POTASSIUM CHLORIDE, CALCIUM CHLORIDE 600; 310; 30; 20 MG/100ML; MG/100ML; MG/100ML; MG/100ML
INJECTION, SOLUTION INTRAVENOUS CONTINUOUS
Status: DISCONTINUED | OUTPATIENT
Start: 2025-05-02 | End: 2025-05-02

## 2025-05-02 RX ORDER — POTASSIUM CHLORIDE 14.9 MG/ML
20 INJECTION INTRAVENOUS ONCE
Status: COMPLETED | OUTPATIENT
Start: 2025-05-02 | End: 2025-05-02

## 2025-05-02 NOTE — PROGRESS NOTES
[facility]  Progress Note    Anmol Gonzalez Patient Status:  Inpatient    2/15/1950 MRN [id number]   [facility] 5NW-A Attending Edmundo Varela MD   Hosp Day # 3 PCP Jess Esquivel MD     Subjective:  Patient is seen resting at bedside. Language line  Shante ID #[id number] was utilized for the entirety of the encounter.  Patient currently denies abdominal pain.  She endorses passing flatus through her ostomy appliance, but is unsure if she has passed stool.  She denies nausea and vomiting, but states she has had increased amount of mucus that she is coughing up.  Total NG output in the past 24 hours is 300.  Patient has been eating ice chips.    Objective/Physical Exam:  General: Awake, alert.  Cooperative.  No apparent distress.  Vital Signs:  Blood pressure (!) 181/75, pulse 68, temperature 98.4 °F (36.9 °C), temperature source Oral, resp. rate 15, height 61\", weight 102 lb 1.2 oz (46.3 kg), SpO2 97%, not currently breastfeeding.  Wt Readings from Last 3 Encounters:   25 102 lb 1.2 oz (46.3 kg)   25 98 lb 6.4 oz (44.6 kg)   25 93 lb 9.6 oz (42.5 kg)     HEENT: Normocephalic, atraumatic. No scleral icterus.  Lungs: Unlabored and equal. No respiratory distress.  Abdomen:  Soft, non distended without tympany to percussion, non tender, with no rebound or guarding.  Ostomy in left lower quadrant present with small amount of stool without gas in ostomy appliance.  Skin: Warm, dry. No erythema, ecchymosis, rashes. No jaundice.    Intake/Output:    Intake/Output Summary (Last 24 hours) at 2025 0837  Last data filed at 2025 0649  Gross per 24 hour   Intake 3830 ml   Output 2700 ml   Net 1130 ml     I/O last 3 completed shifts:  In: 3830 [I.V.:3830]  Out: 4700 [Urine:4300; Emesis/NG output:400]  No intake/output data recorded.    Medications: Scheduled Medications[1]    Labs:  Lab Results   Component Value Date    WBC 13.4 2025    HGB 10.3 2025    HCT 30.3  05/02/2025    .0 05/02/2025     Lab Results   Component Value Date     05/02/2025    K 3.8 05/02/2025    K 3.8 05/02/2025     05/02/2025    CO2 19.0 05/02/2025    BUN 29 05/02/2025    CREATSERUM 2.25 05/02/2025    GLU 97 05/02/2025    CA 8.8 05/02/2025     Lab Results   Component Value Date    INR 1.0 02/13/2025    INR 1.0 11/14/2024    INR 1.0 08/14/2024         Assessment  Problem List[2]    Small bowel obstruction    Plan:  No acute surgical intervention at this time.  Clamp the patients NG tube for 4 hours, if residuals are less than 250 mL, okay to remove the NG tube and start a clear liquid diet. If the patient develops any worsening abdominal pain, nausea or vomiting, reconnect the NG tube to low intermittent suction.    Continue NPO; okay for hard candy.  Continue analgesics and antiemetics as needed.  Ambulate and up to chair.  Medical management per hospitalist.  Nephrostomy tube management per urology.  DVT prophylaxis with heparin.  GI prophylaxis with Protonix.      Quality:  DVT Mechanical Prophylaxis:   SCDs,    DVT Pharmacologic Prophylaxis   Medication    heparin (Porcine) 5000 UNIT/ML injection 5,000 Units                Code Status: Not on file  Pratt: No urinary catheter in place  Pratt Duration (in days):   Central line:    SHELLEY:         GARY Espinosa  5/2/2025  8:37 AM             [1]    pantoprazole  40 mg Intravenous Q24H    cefTRIAXone  2 g Intravenous Q24H    metoprolol  5 mg Intravenous Q6H    heparin  5,000 Units Subcutaneous Q8H [facility]   [2]   Patient Active Problem List  Diagnosis    Hydronephrosis, right    Colostomy in place (HCC)    Stage 3a chronic kidney disease (HCC)    Elevated lipoprotein A level    Hyperlipidemia    Elevated blood pressure reading    Insomnia    Frequent diarrhea    Heart murmur    Thrombocytopenia    SBO (small bowel obstruction) (HCC)    Primary hypertension    Iron deficiency anemia secondary to inadequate dietary iron intake     Hypernatremia    JAMES (acute kidney injury)    Hydronephrosis due to obstruction of ureter    History of cervical cancer

## 2025-05-02 NOTE — CONSULTS
[facility]  Report of Consultation    Anmol Gonzalez Patient Status:  Inpatient    2/15/1950 MRN [id number]   Location [facility] 5NW-A Attending Edmundo Varela MD   Hosp Day # 2 PCP Jess Esquivel MD     Date of service:      2025    Requesting Physician: Dr. Gonzalez    Reason for Consultation:  Recurrent SBO    History of Present Illness:  Anmol Gonzalez is a a(n) 75 year old female    75-year-old female with complex past medical-surgical history.  The patient herself is primarily Mandarin speaking however we are using the assistance of a language line  as well as the patient's daughter Christianne who is joining us via telephone.  The patient has a history of diagnosis of cervical cancer for which she underwent neoadjuvant chemoradiation therapy in  and subsequent hysterectomy in .  Treatment was complicated by vaginal vesicle fistula.  The patient underwent laparotomy for repair but subsequently had recurrent fistulization.  In  the patient underwent laparotomy and lysis of adhesions for adhesive small bowel obstruction.  Postoperatively the patient did develop intestinal leak presumably due to enterotomy and subsequently underwent ileocecectomy.  Subsequent to this procedure the patient underwent 2 additional laparotomies for abdominal washout, sigmoid colon resection and eventual creation of diverting ileostomy.  The patient did require postoperative wound VAC placement.  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  In  the patient underwent CT imaging which appeared to reveal a posterior wall bladder defect and she subsequently in  underwent pelvic exoneration, small bowel resection, bilateral gracilis muscle flaps for perineal Riis construction, cystectomy, cutaneous ureterostomy, transcutaneous ureterostomy left to right in [facility].  The patient currently has an ileostomy in place in the left abdomen and a percutaneous  urostomy tube.    Patient was recently discharged home from [facility] for small bowel obstruction.  During this hospitalization the patient was seen by Dr. Landin.  The patient returns to the emergency department for worsening epigastric abdominal pain.  Workup in the ED reveals CT scan findings revealing improvement and decreased small bowel dilatation.  Serology reveals elevated chloride, diminished CO2 13 elevated creatinine 3.3, BUN 28 GFR 14.  Leukocytosis of 19 hemoglobin 11.5, platelet count 117.  UA is positive for UTI    NG tube was placed in the emergency department and currently the patient denies any abdominal pain.  She does report that the NG tube causes discomfort in the posterior oropharynx and causes her to gag when she moves.      Past medical/surgical history-additional history of hypertension, chronic renal disease  Anticoagulated-patient is not anticoagulated      History:  Past Medical History[1]  Past Surgical History[2]  Family History[3]   reports that she has never smoked. She has never been exposed to tobacco smoke. She has never used smokeless tobacco. She reports that she does not drink alcohol and does not use drugs.    Allergies:  Allergies[4]    Medications:  Current Hospital Medications[5]    Review of Systems:    Allergic/Immuno:  Review of patient's allergies performed.  Constitutional:  Negative for decreased activity, n fever, irritability and lethargy, n unintentional weight loss  Endocrine:  Negative for abnormal sleep patterns, increased activity, polydipsia and polyphagia  HEENT:  Negative for hearing changes,  Nasal discharge  Eyes:  Negative for eye discharge, visual disturbance   Cardiovascular:  Negative for cool extremity and irregular heartbeat/palpitations  Respiratory:  Negative for cough, dyspnea and wheezing, SOB  Gastrointestinal:  y  abdominal pain,  y anorexia, y nausea, n emesis, nn diarrhea, n constipation, n hematochezia  Genitourinary:  Negative for  dysuria and hematuria  Hema/Lymph:  Negative for easy bleeding and easy bruising  Integumentary:  Negative for pruritus and rash, changing pigmented moles, lesions  Musculoskeletal:  Negative for bone/joint symptoms, negative for muscle aches or cramping  Neurological:  Negative for gait disturbance, headaches  Psychiatric:  Negative for inappropriate interaction and psychiatric symptoms      Physical Exam:  Blood pressure 160/52, pulse 74, temperature 98.3 °F (36.8 °C), temperature source Axillary, resp. rate 17, height 61\", weight 102 lb 1.2 oz (46.3 kg), SpO2 97%, not currently breastfeeding.    General:WDWN, in no acute distress   HEENT: Exam is unremarkable.  Without scleral icterus.  Mucous membranes are moist.  Oropharynx is clear.  Neck:  No JVD. Supple.   Lungs: Clear to auscultation bilaterally.  Cardiac: Regular rate and rhythm. No murmur.  Abdomen: Soft, very mildly distended, well-healed midline incision.  No evidence of hernia.  Nontender to deep palpation in all 4 quadrants.  Minimal air in ostomy appliance.  No stool noted.  No percussion tenderness, guarding or rebound no peritoneal signs.     Extremities:  No lower extremity edema noted.  Without clubbing or cyanosis.    Skin: Normal texture and turgor.  Neurologic: Cranial nerves are grossly intact.  Alert and oriented x 3.  No focal neurologic deficit.    Laboratory Data:  Recent Labs   Lab 04/29/25 2033 04/30/25 0641 05/01/25  0646   RBC 3.84   < > 2.95*   HGB 12.3   < > 9.5*   HCT 36.5   < > 28.0*   MCV 95.1   < > 94.9   MCH 32.0   < > 32.2   MCHC 33.7   < > 33.9   RDW 13.2   < > 13.7   NEPRELIM 9.03*  --   --    WBC 9.7   < > 14.5*   .0*   < > 96.0*    < > = values in this interval not displayed.     Recent Labs   Lab 04/29/25 2033 04/30/25 0641 05/01/25  0646 05/01/25  1419   GLU 81 70 90 82   BUN 24* 28* 37* 33*   CREATSERUM 2.44* 3.32* 2.94* 2.60*   CA 9.5 8.3* 8.4* 8.6*   ALB 4.3  --   --   --     143 146* 147*   K 4.3 4.5  3.6 4.3    114* 114* 113*   CO2 18.0* 13.0* 18.0* 19.0*   ALKPHO 51*  --   --   --    AST 25  --   --   --    ALT 33  --   --   --    BILT 1.1  --   --   --    TP 7.2  --   --   --          Assessment/Plan:     75-year-old female with complex past medical-surgical history.  Patient was discharged home recently after episode of SBO.  She presents to the emergency department with recurrent periumbilical abdominal pain.  CT scan in the emergency department revealed decreased small bowel dilatation and overall improvement in the appearance of the small bowel.  NG tube was placed in the emergency department and the patient currently denies any abdominal pain, nausea or vomiting  Abdominal examination is benign and there is some air in the ostomy appliance      At this time the recommendation is to proceed with nonoperative medical management including IV hydration, bowel rest, NG tube decompression, serial abdominal examination, follow-up serology, symptomatic management of pain and nausea.  The patient is encouraged to ambulate and may have hard candy and gum.  We did discuss the possible need for surgical intervention should medical management be unsuccessful.  The patient and her daughter Christianne has no further questions at this time and wishes to proceed with proposed treatment plan.             Discussed:   The potential treatment options were discussed with the patient.  The potential risks, benefits, outcomes/recovery and alternatives to any proposed surgery were also fully reviewed with the patient. Any proposed surgical procedure was described in detail.  The patient verbalizes understanding.  All questions from the patient were discussed in detail to the patient's satisfaction.  No other questions were presented at this time.  Incidental abnormalities found on serology or imaging will be addressed by the patient's PCP or other services as appropriate.    Imaging was reviewed independently and with radiologist  if available.    Thank you,   Althea Brennan MD    Is this a shared note/charge?  No    Please note that this report has been produced using speech recognition software and may contain errors related to that system including but not limited to errors in grammar, punctuation and spelling as well as words and phrases that possibly may have been recognized inappropriately.  If there are any questions or concerns please contact the dictating provider for clarification.  The 21st Century Cures Act makes medical notes like these available to patients in the interest of trans parency. Please be advised this is a medical document. Medical documents are intended to carry relevant information, facts as evident, and the clinical opinion of the practitioner. The medical note is intended as peer to peer communication and may appear blunt or direct. It is written in medical language and may contain abbreviations or verbiage that are unfamiliar.  If there are any questions or concerns please contact the dictating provider for clarification.             [1]   Past Medical History:   Cancer (HCC)    cervicle    CKD (chronic kidney disease)    Exposure to medical diagnostic radiation    High blood pressure    High cholesterol    History of blood transfusion    Personal history of antineoplastic chemotherapy   [2]   Past Surgical History:  Procedure Laterality Date    Other surgical history      BLAADER REMOVAL    Part removal colon w end colostomy     [3]   Family History  Problem Relation Age of Onset    Stroke Father     Stroke Mother    [4]   Allergies  Allergen Reactions    Famotidine NAUSEA AND VOMITING   [5]   Current Facility-Administered Medications:     benzocaine-menthol (Cepacol) lozenge 1 lozenge, 1 lozenge, Oral, PRN    pantoprazole (Protonix) 40 mg in sodium chloride 0.9% PF 10 mL IV push, 40 mg, Intravenous, Q24H    cefTRIAXone (Rocephin) 2 g in sodium chloride 0.9% 100 mL IVPB-ADDV, 2 g, Intravenous, Q24H     metoprolol (Lopressor) 5 mg/5mL injection 5 mg, 5 mg, Intravenous, Q6H    sodium bicarbonate 75 mEq in sodium chloride 0.45% 1,075 mL infusion, 75 mEq, Intravenous, Continuous    HYDROmorphone (Dilaudid) 1 MG/ML injection 0.5 mg, 0.5 mg, Intravenous, Q30 Min PRN    acetaminophen (Tylenol Extra Strength) tab 500 mg, 500 mg, Oral, Q4H PRN    melatonin tab 3 mg, 3 mg, Oral, Nightly PRN    polyethylene glycol (PEG 3350) (Miralax) 17 g oral packet 17 g, 17 g, Oral, Daily PRN    sennosides (Senokot) tab 17.2 mg, 17.2 mg, Oral, Nightly PRN    bisacodyl (Dulcolax) 10 MG rectal suppository 10 mg, 10 mg, Rectal, Daily PRN    ondansetron (Zofran) 4 MG/2ML injection 8 mg, 8 mg, Intravenous, Q6H PRN    benzonatate (Tessalon) cap 200 mg, 200 mg, Oral, TID PRN    glycerin-hypromellose- (Artificial Tears) 0.2-0.2-1 % ophthalmic solution 1 drop, 1 drop, Both Eyes, QID PRN    sodium chloride (Saline Mist) 0.65 % nasal solution 1 spray, 1 spray, Each Nare, Q3H PRN    HYDROmorphone (Dilaudid) 1 MG/ML injection 0.2 mg, 0.2 mg, Intravenous, Q2H PRN **OR** HYDROmorphone (Dilaudid) 1 MG/ML injection 0.4 mg, 0.4 mg, Intravenous, Q2H PRN    hydrALAzine (Apresoline) 20 mg/mL injection 10 mg, 10 mg, Intravenous, Q4H PRN    heparin (Porcine) 5000 UNIT/ML injection 5,000 Units, 5,000 Units, Subcutaneous, Q8H JENNYFER

## 2025-05-02 NOTE — PLAN OF CARE
Pt is A&Ox4, mandrin speaking.. used.  VSS, afebrile and denies any pain. SPO2 maintained on room air. Continuous pulse ox. Denies any SOB, cough. Tele/NS.  Heparin. NPO. NG in right nare to LIS. Nauseas managed with prn meds. Right nephrostomy. LUQ colostomy.  Up with Standby. IV fluids. IV ABXSafety precautions in place.  Pt is updated with plan of care.   Problem: Patient/Family Goals  Goal: Patient/Family Long Term Goal  Description: Patient's Long Term Goal: discharge  Interventions:- follow poc  - See additional Care Plan goals for specific interventions  Outcome: Progressing  Goal: Patient/Family Short Term Goal  Description: Patient's Short Term Goal:   4/29 NOC: rest, sleep  4/30AM: nephrostomy replacement  4/30 noc: manage nausea  5/1 noc: manage bps    Interventions: - - See additional Care Plan goals for specific interventions  Outcome: Progressing

## 2025-05-02 NOTE — PROGRESS NOTES
[facility]   part of [facility]     Hospitalist Progress Note     Anmol Gonzalez Patient Status:  Inpatient    2/15/1950 MRN [id number]   Location [facility] 5NW-A Attending Edmundo Varela MD   Hosp Day # 3 PCP Jess Esquivel MD     Chief Complaint: abd pain    Subjective:     Patient has no complaints.     Objective:    Review of Systems:   ROS completed; pertinent positive and negatives stated in subjective.    Vital signs:  Temp:  [97.9 °F (36.6 °C)-98.4 °F (36.9 °C)] 98 °F (36.7 °C)  Pulse:  [61-75] 75  Resp:  [13-20] 16  BP: (160-202)/(52-75) 171/66  SpO2:  [96 %-97 %] 97 %    Physical Exam:    General: No acute distress  Respiratory: Clear to auscultation bilaterally  Cardiovascular: S1, S2.  Abdomen: Soft, ostomy in place, nephrostomy tube in place  Neuro: No new focal deficits      Diagnostic Data:    Labs:  Recent Labs   Lab 25  0748 25  0641 25  0646 25  0710   WBC 5.0 9.7 19.0* 14.5* 13.4*   HGB 9.7* 12.3 11.5* 9.5* 10.3*   MCV 97.4 95.1 101.1* 94.9 95.3   .0* 146.0* 117.0* 96.0* 124.0*       Recent Labs   Lab 25  0641 25  0646 25  1419 25  0710   GLU 81   < > 90 82 97   BUN 24*   < > 37* 33* 29*   CREATSERUM 2.44*   < > 2.94* 2.60* 2.25*   CA 9.5   < > 8.4* 8.6* 8.8   ALB 4.3  --   --   --   --       < > 146* 147* 148*   K 4.3   < > 3.6 4.3 3.8  3.8      < > 114* 113* 113*   CO2 18.0*   < > 18.0* 19.0* 19.0*   ALKPHO 51*  --   --   --   --    AST 25  --   --   --   --    ALT 33  --   --   --   --    BILT 1.1  --   --   --   --    TP 7.2  --   --   --   --     < > = values in this interval not displayed.       Estimated Glomerular Filtration Rate: 22 mL/min/1.73m2 (A) (result from lab).     No results for input(s): \"TROP\", \"TROPHS\", \"CK\" in the last 168 hours.    No results for input(s): \"PTP\", \"INR\" in the last 168 hours.           Imaging: Imaging data reviewed in Epic.    Medications: Scheduled  Medications[1]    Assessment & Plan:     #SBO  resolving  NPO  IVF  NGT clamped    #Right hydroureteronephrosis  Nephrostomy tube exchanged    #? Pyelo  U/a noted  UCX neg  Ruled out  DC ABX    #JAMES  IVF  Right Nephrostomy tube exchanged  Slowly improving    #Metabolic Acidosis  improved    #HTN  IV BB for now    #HLD  statin    Dispo: as above. IVF adjusted.  Cr slowly improving.  NGT clamping trial today.          Edmundo Varela MD    Supplementary Documentation:     Quality:    DVT Mechanical Prophylaxis:   SCDs,    DVT Pharmacologic Prophylaxis   Medication    heparin (Porcine) 5000 UNIT/ML injection 5,000 Units                Code Status: Not on file  Pratt: No urinary catheter in place  Rpatt Duration (in days):   Central line:    SHELLEY:       Discharge is dependent on: clinical stability  At this point Ms. Gonzalez is expected to be discharge to: home    The 21st Century Cures Act makes medical notes like these available to patients in the interest of transparency. Please be advised this is a medical document. Medical documents are intended to carry relevant information, facts as evident, and the clinical opinion of the practitioner. The medical note is intended as peer to peer communication and may appear blunt or direct. It is written in medical language and may contain abbreviations or verbiage that are unfamiliar.              **Certification      PHYSICIAN Certification of Need for Inpatient Hospitalization - Initial Certification    Patient will require inpatient services that will reasonably be expected to span two midnight's based on the clinical documentation in H+P.   Based on patients current state of illness, I anticipate that, after discharge, patient will require TBD.         [1]    pantoprazole  40 mg Intravenous Q24H    metoprolol  5 mg Intravenous Q6H    heparin  5,000 Units Subcutaneous Q8H JENNYFER

## 2025-05-03 LAB
ANION GAP SERPL CALC-SCNC: 11 MMOL/L (ref 0–18)
BUN BLD-MCNC: 27 MG/DL (ref 9–23)
CALCIUM BLD-MCNC: 8.2 MG/DL (ref 8.7–10.6)
CHLORIDE SERPL-SCNC: 103 MMOL/L (ref 98–112)
CO2 SERPL-SCNC: 26 MMOL/L (ref 21–32)
CREAT BLD-MCNC: 1.75 MG/DL (ref 0.55–1.02)
EGFRCR SERPLBLD CKD-EPI 2021: 30 ML/MIN/1.73M2 (ref 60–?)
ERYTHROCYTE [DISTWIDTH] IN BLOOD BY AUTOMATED COUNT: 13.5 %
GLUCOSE BLD-MCNC: 122 MG/DL (ref 70–99)
HCT VFR BLD AUTO: 27.9 % (ref 35–48)
HGB BLD-MCNC: 9.7 G/DL (ref 12–16)
MAGNESIUM SERPL-MCNC: 1.6 MG/DL (ref 1.6–2.6)
MCH RBC QN AUTO: 32.2 PG (ref 26–34)
MCHC RBC AUTO-ENTMCNC: 34.8 G/DL (ref 31–37)
MCV RBC AUTO: 92.7 FL (ref 80–100)
OSMOLALITY SERPL CALC.SUM OF ELEC: 296 MOSM/KG (ref 275–295)
PHOSPHATE SERPL-MCNC: 2 MG/DL (ref 2.4–5.1)
PLATELET # BLD AUTO: 118 10(3)UL (ref 150–450)
POTASSIUM SERPL-SCNC: 2.8 MMOL/L (ref 3.5–5.1)
POTASSIUM SERPL-SCNC: 2.8 MMOL/L (ref 3.5–5.1)
POTASSIUM SERPL-SCNC: 3.2 MMOL/L (ref 3.5–5.1)
RBC # BLD AUTO: 3.01 X10(6)UL (ref 3.8–5.3)
SODIUM SERPL-SCNC: 140 MMOL/L (ref 136–145)
WBC # BLD AUTO: 9.1 X10(3) UL (ref 4–11)

## 2025-05-03 PROCEDURE — 99232 SBSQ HOSP IP/OBS MODERATE 35: CPT | Performed by: HOSPITALIST

## 2025-05-03 PROCEDURE — 99232 SBSQ HOSP IP/OBS MODERATE 35: CPT | Performed by: STUDENT IN AN ORGANIZED HEALTH CARE EDUCATION/TRAINING PROGRAM

## 2025-05-03 RX ORDER — ROSUVASTATIN CALCIUM 5 MG/1
5 TABLET, COATED ORAL NIGHTLY
Status: DISCONTINUED | OUTPATIENT
Start: 2025-05-03 | End: 2025-05-04

## 2025-05-03 RX ORDER — PANTOPRAZOLE SODIUM 40 MG/1
40 TABLET, DELAYED RELEASE ORAL
Status: DISCONTINUED | OUTPATIENT
Start: 2025-05-04 | End: 2025-05-04

## 2025-05-03 RX ORDER — POTASSIUM CHLORIDE 1500 MG/1
40 TABLET, EXTENDED RELEASE ORAL EVERY 4 HOURS
Status: COMPLETED | OUTPATIENT
Start: 2025-05-03 | End: 2025-05-04

## 2025-05-03 RX ORDER — METOPROLOL SUCCINATE 25 MG/1
25 TABLET, EXTENDED RELEASE ORAL NIGHTLY
Status: DISCONTINUED | OUTPATIENT
Start: 2025-05-04 | End: 2025-05-04

## 2025-05-03 RX ORDER — METOPROLOL SUCCINATE 25 MG/1
25 TABLET, EXTENDED RELEASE ORAL DAILY
Status: DISCONTINUED | OUTPATIENT
Start: 2025-05-03 | End: 2025-05-03

## 2025-05-03 RX ORDER — MAGNESIUM OXIDE 400 MG/1
400 TABLET ORAL ONCE
Status: COMPLETED | OUTPATIENT
Start: 2025-05-03 | End: 2025-05-03

## 2025-05-03 NOTE — PROGRESS NOTES
Patient  is Mandarin speaking.  Video  utilized (Felipe,  # [id number]).  She denies pain/discomfort.   She is tolerating clear liquids.  Reports she is passing gas through the colostomy tube.   She denies  abdominal pain or nausea. Plan of care discussed with her.  She verbalized understanding and states that she has no questions at this time.  Will follow.

## 2025-05-03 NOTE — PLAN OF CARE
Problem: Patient/Family Goals  Goal: Patient/Family Long Term Goal  Description: Patient's Long Term Goal: discharge  Interventions:- follow poc  - See additional Care Plan goals for specific interventions  Outcome: Progressing  Goal: Patient/Family Short Term Goal  Description: Patient's Short Term Goal:   4/29 NOC: rest, sleep  4/30AM: nephrostomy replacement  4/30 noc: manage nausea  5/1 noc: manage bps  05/02/2025 - manage blood pressures    Interventions: - - See additional Care Plan goals for specific interventions  - lopressor and hydralazine as ordered  Outcome: Progressing     Problem: PAIN - ADULT  Goal: Verbalizes/displays adequate comfort level or patient's stated pain goal  Description: INTERVENTIONS:- Encourage pt to monitor pain and request assistance- Assess pain using appropriate pain scale- Administer analgesics based on type and severity of pain and evaluate response- Implement non-pharmacological measures as appropriate and evaluate response- Consider cultural and social influences on pain and pain management- Manage/alleviate anxiety- Utilize distraction and/or relaxation techniques- Monitor for opioid side effects- Notify MD/LIP if interventions unsuccessful or patient reports new pain- Anticipate increased pain with activity and pre-medicate as appropriate  Outcome: Progressing     Problem: SAFETY ADULT - FALL  Goal: Free from fall injury  Description: INTERVENTIONS:- Assess pt frequently for physical needs- Identify cognitive and physical deficits and behaviors that affect risk of falls.- [place] fall precautions as indicated by assessment.- Educate pt/family on patient safety including physical limitations- Instruct pt to call for assistance with activity based on assessment- Modify environment to reduce risk of injury- Provide assistive devices as appropriate- Consider OT/PT consult to assist with strengthening/mobility- Encourage toileting schedule  Outcome: Progressing     Problem:  Altered Communication/Language Barrier  Goal: Patient/Family is able to understand and participate in their care  Description: Interventions:- Assess communication ability and preferred communication style- Implement communication aides and strategies- Use visual cues when possible- Listen attentively, be patient, do not interrupt- Minimize distractions- Allow time for understanding and response- Establish method for patient to ask for assistance (call light)- Provide an  as needed- Communicate barriers and strategies to overcome with those who interact with patient  Outcome: Progressing     Problem: GASTROINTESTINAL - ADULT  Goal: Minimal or absence of nausea and vomiting  Description: INTERVENTIONS:- Maintain adequate hydration with IV or PO as ordered and tolerated- Nasogastric tube to low intermittent suction as ordered- Evaluate effectiveness of ordered antiemetic medications- Provide nonpharmacologic comfort measures as appropriate- Advance diet as tolerated, if ordered- Obtain nutritional consult as needed- Evaluate fluid balance  Outcome: Progressing  Goal: Maintains or returns to baseline bowel function  Description: INTERVENTIONS:- Assess bowel function- Maintain adequate hydration with IV or PO as ordered and tolerated- Evaluate effectiveness of GI medications- Encourage mobilization and activity- Obtain nutritional consult as needed- Establish a toileting routine/schedule- Consider collaborating with pharmacy to review patient's medication profile  Outcome: Progressing     Problem: METABOLIC/FLUID AND ELECTROLYTES - ADULT  Goal: Electrolytes maintained within normal limits  Description: INTERVENTIONS:- Monitor labs and rhythm and assess patient for signs and symptoms of electrolyte imbalances- Administer electrolyte replacement as ordered- Monitor response to electrolyte replacements, including rhythm and repeat lab results as appropriate- Fluid restriction as ordered- Instruct patient on  fluid and nutrition restrictions as appropriate  Outcome: Progressing  Goal: Hemodynamic stability and optimal renal function maintained  Description: INTERVENTIONS:- Monitor labs and assess for signs and symptoms of volume excess or deficit- Monitor intake, output and patient weight- Monitor urine specific gravity, serum osmolarity and serum sodium as indicated or ordered- Monitor response to interventions for patient's volume status, including labs, urine output, blood pressure (other measures as available)- Encourage oral intake as appropriate- Instruct patient on fluid and nutrition restrictions as appropriate  Outcome: Progressing

## 2025-05-03 NOTE — PLAN OF CARE
Pt A&Ox4, Mandarin speaking only. On  and tele. On RA. Colostomy and nephrostomy tube in place. Up ad cynthia. Hydralazine prn given x2. NG clamp trial successful, no residuals, NG tube removed, clears diet initiated. Pt and family updated on plan of care, all questions and concerns addressed, verbalized understanding. Safety precautions in place, no further needs at this time.     Problem: Patient/Family Goals  Goal: Patient/Family Long Term Goal  Description: Patient's Long Term Goal: discharge  Interventions:- follow poc  - See additional Care Plan goals for specific interventions  Outcome: Progressing  Goal: Patient/Family Short Term Goal  Description: Patient's Short Term Goal:   4/29 NOC: rest, sleep  4/30AM: nephrostomy replacement  4/30 noc: manage nausea  5/1 noc: manage bps    Interventions: - - See additional Care Plan goals for specific interventions  Outcome: Progressing     Problem: PAIN - ADULT  Goal: Verbalizes/displays adequate comfort level or patient's stated pain goal  Description: INTERVENTIONS:- Encourage pt to monitor pain and request assistance- Assess pain using appropriate pain scale- Administer analgesics based on type and severity of pain and evaluate response- Implement non-pharmacological measures as appropriate and evaluate response- Consider cultural and social influences on pain and pain management- Manage/alleviate anxiety- Utilize distraction and/or relaxation techniques- Monitor for opioid side effects- Notify MD/LIP if interventions unsuccessful or patient reports new pain- Anticipate increased pain with activity and pre-medicate as appropriate  Outcome: Progressing     Problem: SAFETY ADULT - FALL  Goal: Free from fall injury  Description: INTERVENTIONS:- Assess pt frequently for physical needs- Identify cognitive and physical deficits and behaviors that affect risk of falls.- [place] fall precautions as indicated by assessment.- Educate pt/family on patient safety including  physical limitations- Instruct pt to call for assistance with activity based on assessment- Modify environment to reduce risk of injury- Provide assistive devices as appropriate- Consider OT/PT consult to assist with strengthening/mobility- Encourage toileting schedule  Outcome: Progressing     Problem: Altered Communication/Language Barrier  Goal: Patient/Family is able to understand and participate in their care  Description: Interventions:- Assess communication ability and preferred communication style- Implement communication aides and strategies- Use visual cues when possible- Listen attentively, be patient, do not interrupt- Minimize distractions- Allow time for understanding and response- Establish method for patient to ask for assistance (call light)- Provide an  as needed- Communicate barriers and strategies to overcome with those who interact with patient  Outcome: Progressing     Problem: GASTROINTESTINAL - ADULT  Goal: Minimal or absence of nausea and vomiting  Description: INTERVENTIONS:- Maintain adequate hydration with IV or PO as ordered and tolerated- Nasogastric tube to low intermittent suction as ordered- Evaluate effectiveness of ordered antiemetic medications- Provide nonpharmacologic comfort measures as appropriate- Advance diet as tolerated, if ordered- Obtain nutritional consult as needed- Evaluate fluid balance  Outcome: Progressing  Goal: Maintains or returns to baseline bowel function  Description: INTERVENTIONS:- Assess bowel function- Maintain adequate hydration with IV or PO as ordered and tolerated- Evaluate effectiveness of GI medications- Encourage mobilization and activity- Obtain nutritional consult as needed- Establish a toileting routine/schedule- Consider collaborating with pharmacy to review patient's medication profile  Outcome: Progressing

## 2025-05-03 NOTE — PROGRESS NOTES
[facility]   part of [facility]     Hospitalist Progress Note     Anmol Gonzalez Patient Status:  Inpatient    2/15/1950 MRN [id number]   Location [facility] 5NW-A Attending Edmundo Varela MD   Hosp Day # 4 PCP Jess Esquivel MD     Chief Complaint: abd pain    Subjective:     Patient feels well.     Objective:    Review of Systems:   ROS completed; pertinent positive and negatives stated in subjective.    Vital signs:  Temp:  [98.4 °F (36.9 °C)-99.6 °F (37.6 °C)] 98.5 °F (36.9 °C)  Pulse:  [58-74] 70  Resp:  [10-19] 16  BP: (109-184)/(38-70) 138/59    Physical Exam:    General: No acute distress  Respiratory: Clear to auscultation bilaterally  Cardiovascular: S1, S2.  Abdomen: Soft, ostomy in place, nephrostomy tube in place  Neuro: No new focal deficits      Diagnostic Data:    Labs:  Recent Labs   Lab 25  0641 25  0646 25  0710 25  0706   WBC 9.7 19.0* 14.5* 13.4* 9.1   HGB 12.3 11.5* 9.5* 10.3* 9.7*   MCV 95.1 101.1* 94.9 95.3 92.7   .0* 117.0* 96.0* 124.0* 118.0*       Recent Labs   Lab 25  0641 25  1419 25  0710 25  0706   GLU 81   < > 82 97 122*   BUN 24*   < > 33* 29* 27*   CREATSERUM 2.44*   < > 2.60* 2.25* 1.75*   CA 9.5   < > 8.6* 8.8 8.2*   ALB 4.3  --   --   --   --       < > 147* 148* 140   K 4.3   < > 4.3 3.8  3.8 2.8*  2.8*      < > 113* 113* 103   CO2 18.0*   < > 19.0* 19.0* 26.0   ALKPHO 51*  --   --   --   --    AST 25  --   --   --   --    ALT 33  --   --   --   --    BILT 1.1  --   --   --   --    TP 7.2  --   --   --   --     < > = values in this interval not displayed.       Estimated Glomerular Filtration Rate: 30 mL/min/1.73m2 (A) (result from lab).     No results for input(s): \"TROP\", \"TROPHS\", \"CK\" in the last 168 hours.    No results for input(s): \"PTP\", \"INR\" in the last 168 hours.           Imaging: Imaging data reviewed in Epic.    Medications: Scheduled  Medications[1]    Assessment & Plan:     #SBO  resolving  NGT out  Advance diet    #Right hydroureteronephrosis  Nephrostomy tube exchanged    #? Pyelo  U/a noted  UCX neg  Ruled out  DC ABX    #JAMES on CKD   HL IVF  Right Nephrostomy tube exchanged  Slowly improving    #Metabolic Acidosis  Improved  Some degree of chronicity  On PO bicarb as OP    #HTN  IV BB for now    #HLD  Statin    #Hypernatremia  Resolved with fluids    #Hypokalemia  Replace and monitor    Dispo: as above. HL IVF. Advance diet      Edmundo Varela MD    Supplementary Documentation:     Quality:    DVT Mechanical Prophylaxis:   SCDs,    DVT Pharmacologic Prophylaxis   Medication    heparin (Porcine) 5000 UNIT/ML injection 5,000 Units                Code Status: Not on file  Pratt: No urinary catheter in place  Pratt Duration (in days):   Central line:    SHELLEY:       Discharge is dependent on: clinical stability  At this point Ms. Gonzalez is expected to be discharge to: home    The 21st Century Cures Act makes medical notes like these available to patients in the interest of transparency. Please be advised this is a medical document. Medical documents are intended to carry relevant information, facts as evident, and the clinical opinion of the practitioner. The medical note is intended as peer to peer communication and may appear blunt or direct. It is written in medical language and may contain abbreviations or verbiage that are unfamiliar.              **Certification      PHYSICIAN Certification of Need for Inpatient Hospitalization - Initial Certification    Patient will require inpatient services that will reasonably be expected to span two midnight's based on the clinical documentation in H+P.   Based on patients current state of illness, I anticipate that, after discharge, patient will require TBD.         [1]    potassium chloride  40 mEq Intravenous Once    pantoprazole  40 mg Intravenous Q24H    metoprolol  5 mg Intravenous Q6H    heparin   5,000 Units Subcutaneous Q8H JENNYFER

## 2025-05-03 NOTE — PLAN OF CARE
A/o x4. Mandarin speaking.  used. Denies pain n/v. Tolerated full liquid diet. Upgraded to soft. Potassium 2.8- Potassium phosphate and magnesium replaced per protocol. VSS. Ambulating in [place]ian. Safety precautions in place. No further needs at this time.  Problem: Patient/Family Goals  Goal: Patient/Family Long Term Goal  Description: Patient's Long Term Goal: discharge  Interventions:- follow poc  - See additional Care Plan goals for specific interventions  Outcome: Progressing  Goal: Patient/Family Short Term Goal  Description: Patient's Short Term Goal:   4/29 NOC: rest, sleep  4/30AM: nephrostomy replacement  4/30 noc: manage nausea  5/1 noc: manage bps  05/02/2025 - manage blood pressures    Interventions: - - See additional Care Plan goals for specific interventions  - lopressor and hydralazine as ordered  Outcome: Progressing

## 2025-05-03 NOTE — PROGRESS NOTES
[facility]  Progress Note    Anmol Gonzalez Patient Status:  Inpatient    2/15/1950 MRN [id number]   Location [facility] 5NW-A Attending Edmundo Varela MD   Hosp Day # 4 PCP Jess Esquivel MD     Subjective:  She is seen and examined resting in bed. Her daughter was called over the phone and aids in translation. She passed NG clamp trial yesterday and NG tube was removed. She was started on clear liquid diet. She reports continued gas output from her appliance.     Objective/Physical Exam:  /38 (BP Location: Left arm)   Pulse 66   Temp 99.4 °F (37.4 °C) (Axillary)   Resp 17   Ht 5' 1\" (1.549 m)   Wt 102 lb 1.2 oz (46.3 kg)   SpO2 97%   BMI 19.29 kg/m²     Intake/Output Summary (Last 24 hours) at 5/3/2025 0930  Last data filed at 5/3/2025 0714  Gross per 24 hour   Intake 1133 ml   Output 1550 ml   Net -417 ml         General: Awake, Alert,  Cooperative.  No apparent distress.  HEENT: Normocephalic, atraumatic. No scleral icterus  Pulm: no respiratory distress, no increased work of breathing  Abdomen:  Soft, non-distended,non-tender to palpation, with no rebound or guarding.  No peritoneal signs. Ostomy to left lower quadrant with gas in appliance.   Skin: Warm, dry. No jaundice.     Labs:  Lab Results   Component Value Date    WBC 9.1 2025    HGB 9.7 2025    HCT 27.9 2025    .0 2025      Lab Results   Component Value Date    INR 1.0 2025    INR 1.0 2024    INR 1.0 2024     Lab Results   Component Value Date     2025    K 2.8 2025    K 2.8 2025     2025    CO2 26.0 2025    BUN 27 2025    CREATSERUM 1.75 2025     2025    CA 8.2 2025            Assessment:  Problem List[1]    Small bowel obstruction    Plan:  No plan for acute surgical intervention  Can advance to full liquid diet. She may advance to soft diet as tolerated  Analgesics and antiemetics as needed  Encourage  ambulation and up to chair  Medical management per primary   DVT prophylaxis with heparin  GI prophylaxis with protonix  Possible discharge home tomorrow pending advancement of diet and further return of bowel function.     Xiomara Chavez PA-C  5/3/2025  9:30 AM    This note was initiated by Xiomara Chavez.  The PA saw the patient in conjunction with me. The PA performed a history, exam, and developed the assessment and plan in conjunction with me. I agree with the above note and have made changes which reflect my own history and physical, if necessary.    Discussion of history, physical, and plan done with the help of patient's daughter Christianne via phone  Patient reports improvement of her ostomy output  She does not have an appetite but she feels better  She denies any nausea vomiting and has been tolerating liquids  Advance diet as tolerated  Encourage ambulation and mobilization  Potassium low on serology this morning, replace per protocol  Patient may benefit from long-term potassium administration  I did discuss with the daughter that patient may have chronic bowel obstruction due to the number of surgeries she has previously had and her intestines appearing stuck based on multiple CT imaging  Surgery will continue to follow  Rest of care per primary    Najma Sagastume MD  [facility] General Surgery  5/3/2025  4:16 PM           [1]   Patient Active Problem List  Diagnosis    Hydronephrosis, right    Colostomy in place (HCC)    Stage 3a chronic kidney disease (HCC)    Elevated lipoprotein A level    Hyperlipidemia    Elevated blood pressure reading    Insomnia    Frequent diarrhea    Heart murmur    Thrombocytopenia    SBO (small bowel obstruction) (HCC)    Primary hypertension    Iron deficiency anemia secondary to inadequate dietary iron intake    Hypernatremia    JAMES (acute kidney injury)    Hydronephrosis due to obstruction of ureter    History of cervical cancer

## 2025-05-04 VITALS
OXYGEN SATURATION: 97 % | DIASTOLIC BLOOD PRESSURE: 63 MMHG | HEART RATE: 65 BPM | BODY MASS INDEX: 19.27 KG/M2 | SYSTOLIC BLOOD PRESSURE: 154 MMHG | WEIGHT: 102.06 LBS | HEIGHT: 61 IN | RESPIRATION RATE: 21 BRPM | TEMPERATURE: 100 F

## 2025-05-04 LAB
MAGNESIUM SERPL-MCNC: 1.4 MG/DL (ref 1.6–2.6)
PHOSPHATE SERPL-MCNC: 2.6 MG/DL (ref 2.4–5.1)
POTASSIUM SERPL-SCNC: 4.4 MMOL/L (ref 3.5–5.1)

## 2025-05-04 PROCEDURE — 99232 SBSQ HOSP IP/OBS MODERATE 35: CPT | Performed by: STUDENT IN AN ORGANIZED HEALTH CARE EDUCATION/TRAINING PROGRAM

## 2025-05-04 PROCEDURE — 99239 HOSP IP/OBS DSCHRG MGMT >30: CPT | Performed by: HOSPITALIST

## 2025-05-04 RX ORDER — MAGNESIUM OXIDE 400 MG/1
800 TABLET ORAL ONCE
Status: COMPLETED | OUTPATIENT
Start: 2025-05-04 | End: 2025-05-04

## 2025-05-04 NOTE — PROGRESS NOTES
NURSING DISCHARGE NOTE    Discharged Home via Ambulatory.  Accompanied by Family member and Support staff  Belongings Taken by patient/family.    AVS explained to patient and daughter at bedside. Answered all questions and concerns. PIV removed. VSS. No further needs at this time.

## 2025-05-04 NOTE — PLAN OF CARE
A/o x4. On room air. VSS. NSR on tele. Heparin. Tolerating soft diet. Up self. Denies pain. Discharge planning. No further needs at this time.   Problem: Patient/Family Goals  Goal: Patient/Family Long Term Goal  Description: Patient's Long Term Goal: discharge  Interventions:- follow poc  - See additional Care Plan goals for specific interventions  Outcome: Progressing  Goal: Patient/Family Short Term Goal  Description: Patient's Short Term Goal:   4/29 NOC: rest, sleep  4/30AM: nephrostomy replacement  4/30 noc: manage nausea  5/1 noc: manage bps  05/02/2025 - manage blood pressures  05/03/2025 - for K level to normalize    Interventions: - - See additional Care Plan goals for specific interventions  - lopressor and hydralazine as ordered  - replace K per protocol  Outcome: Progressing

## 2025-05-04 NOTE — PROGRESS NOTES
[facility]  Progress Note    Anmol Gonzalez Patient Status:  Inpatient    2/15/1950 MRN [id number]   Location [facility] 5NW-A Attending Edmundo Varela MD   Hosp Day # 5 PCP Jess Esquivel MD     Subjective:  Patient seen and examined at bedside. Daughter, Christianne on the phone assisting with translation. Patient states she is feeling very well. She denies any nausea or vomiting or bloating. She continue to have output in her ostomy appliance.    Objective/Physical Exam:  General: Alert, orientated x3.  Cooperative.  No apparent distress.  Vital Signs:  Blood pressure 134/64, pulse 63, temperature 100 °F (37.8 °C), temperature source Oral, resp. rate 21, height 61\", weight 102 lb 1.2 oz (46.3 kg), SpO2 97%, not currently breastfeeding.  Wt Readings from Last 3 Encounters:   25 102 lb 1.2 oz (46.3 kg)   25 98 lb 6.4 oz (44.6 kg)   25 93 lb 9.6 oz (42.5 kg)     Lungs: No respiratory distress.  Cardiac: Regular rate and rhythm.   Abdomen:  Soft, non distended, non tender, with no rebound or guarding.  No peritoneal signs. Ostomy appliance to left lower quadrant with stool in bag.  Extremities:  No lower extremity edema noted.      Intake/Output:    Intake/Output Summary (Last 24 hours) at 2025 1329  Last data filed at 2025 1255  Gross per 24 hour   Intake --   Output 2175 ml   Net -2175 ml     I/O last 3 completed shifts:  In: 1133 [I.V.:1133]  Out: 1625 [Urine:1575; Stool:50]  I/O this shift:  In: -   Out: 950 [Urine:700; Stool:250]    Medications: Scheduled Medications[1]    Labs:     Lab Results   Component Value Date    K 4.4 2025     Lab Results   Component Value Date    INR 1.0 2025    INR 1.0 2024    INR 1.0 2024         Assessment  Problem List[2]    Small bowel obstruction, resolved    Plan:  Continue soft diet as patient tolerates  Continue analgesics and antiemetics as needed  Ambulate and up to chair  DVT prophylaxis with heparin  GI prophylaxis with  Protonix  Patient is stable from general surgery standpoint for discharge to home today, once tolerating soft diet, and pending clearance from other specialties. She may follow up with [facility] General Surgery as needed.    Quality:  DVT Mechanical Prophylaxis:   SCDs,    DVT Pharmacologic Prophylaxis   Medication    heparin (Porcine) 5000 UNIT/ML injection 5,000 Units                Code Status: Not on file  Pratt: No urinary catheter in place  Pratt Duration (in days):   Central line:    SHELLEY: 5/4/2025        Jaki Fatima PA-C  5/4/2025  1:29 PM             [1]    rosuvastatin  5 mg Oral Nightly    metoprolol succinate ER  25 mg Oral Nightly    pantoprazole  40 mg Oral QAM AC    heparin  5,000 Units Subcutaneous Q8H JENNYFER   [2]   Patient Active Problem List  Diagnosis    Hydronephrosis, right    Colostomy in place (HCC)    Stage 3a chronic kidney disease (HCC)    Elevated lipoprotein A level    Hyperlipidemia    Elevated blood pressure reading    Insomnia    Frequent diarrhea    Heart murmur    Thrombocytopenia    SBO (small bowel obstruction) (HCC)    Primary hypertension    Iron deficiency anemia secondary to inadequate dietary iron intake    Hypernatremia    JAMES (acute kidney injury)    Hydronephrosis due to obstruction of ureter    History of cervical cancer

## 2025-05-04 NOTE — PLAN OF CARE
Problem: Patient/Family Goals  Goal: Patient/Family Long Term Goal  Description: Patient's Long Term Goal: discharge  Interventions:- follow poc  - See additional Care Plan goals for specific interventions  Outcome: Progressing  Goal: Patient/Family Short Term Goal  Description: Patient's Short Term Goal:   4/29 NOC: rest, sleep  4/30AM: nephrostomy replacement  4/30 noc: manage nausea  5/1 noc: manage bps  05/02/2025 - manage blood pressures  05/03/2025 - for K level to normalize    Interventions: - - See additional Care Plan goals for specific interventions  - lopressor and hydralazine as ordered  - replace K per protocol  Outcome: Progressing     Problem: PAIN - ADULT  Goal: Verbalizes/displays adequate comfort level or patient's stated pain goal  Description: INTERVENTIONS:- Encourage pt to monitor pain and request assistance- Assess pain using appropriate pain scale- Administer analgesics based on type and severity of pain and evaluate response- Implement non-pharmacological measures as appropriate and evaluate response- Consider cultural and social influences on pain and pain management- Manage/alleviate anxiety- Utilize distraction and/or relaxation techniques- Monitor for opioid side effects- Notify MD/LIP if interventions unsuccessful or patient reports new pain- Anticipate increased pain with activity and pre-medicate as appropriate  Outcome: Progressing     Problem: SAFETY ADULT - FALL  Goal: Free from fall injury  Description: INTERVENTIONS:- Assess pt frequently for physical needs- Identify cognitive and physical deficits and behaviors that affect risk of falls.- Stella fall precautions as indicated by assessment.- Educate pt/family on patient safety including physical limitations- Instruct pt to call for assistance with activity based on assessment- Modify environment to reduce risk of injury- Provide assistive devices as appropriate- Consider OT/PT consult to assist with strengthening/mobility-  Encourage toileting schedule  Outcome: Progressing     Problem: Altered Communication/Language Barrier  Goal: Patient/Family is able to understand and participate in their care  Description: Interventions:- Assess communication ability and preferred communication style- Implement communication aides and strategies- Use visual cues when possible- Listen attentively, be patient, do not interrupt- Minimize distractions- Allow time for understanding and response- Establish method for patient to ask for assistance (call light)- Provide an  as needed- Communicate barriers and strategies to overcome with those who interact with patient  Outcome: Progressing     Problem: GASTROINTESTINAL - ADULT  Goal: Minimal or absence of nausea and vomiting  Description: INTERVENTIONS:- Maintain adequate hydration with IV or PO as ordered and tolerated- Nasogastric tube to low intermittent suction as ordered- Evaluate effectiveness of ordered antiemetic medications- Provide nonpharmacologic comfort measures as appropriate- Advance diet as tolerated, if ordered- Obtain nutritional consult as needed- Evaluate fluid balance  Outcome: Progressing  Goal: Maintains or returns to baseline bowel function  Description: INTERVENTIONS:- Assess bowel function- Maintain adequate hydration with IV or PO as ordered and tolerated- Evaluate effectiveness of GI medications- Encourage mobilization and activity- Obtain nutritional consult as needed- Establish a toileting routine/schedule- Consider collaborating with pharmacy to review patient's medication profile  Outcome: Progressing     Problem: METABOLIC/FLUID AND ELECTROLYTES - ADULT  Goal: Electrolytes maintained within normal limits  Description: INTERVENTIONS:- Monitor labs and rhythm and assess patient for signs and symptoms of electrolyte imbalances- Administer electrolyte replacement as ordered- Monitor response to electrolyte replacements, including rhythm and repeat lab results as  appropriate- Fluid restriction as ordered- Instruct patient on fluid and nutrition restrictions as appropriate  Outcome: Progressing  Goal: Hemodynamic stability and optimal renal function maintained  Description: INTERVENTIONS:- Monitor labs and assess for signs and symptoms of volume excess or deficit- Monitor intake, output and patient weight- Monitor urine specific gravity, serum osmolarity and serum sodium as indicated or ordered- Monitor response to interventions for patient's volume status, including labs, urine output, blood pressure (other measures as available)- Encourage oral intake as appropriate- Instruct patient on fluid and nutrition restrictions as appropriate  Outcome: Progressing

## 2025-05-04 NOTE — DISCHARGE INSTRUCTIONS
Get blood work (BMP) done prior to seeing your primary care doctor.  See your primary care doctor within 1 week

## 2025-05-05 ENCOUNTER — PATIENT OUTREACH (OUTPATIENT)
Dept: CASE MANAGEMENT | Age: 75
End: 2025-05-05

## 2025-05-05 NOTE — PROGRESS NOTES
[facility]  General Surgery Progress Note    Anmol Gonzalez Patient Status:  Inpatient    2/15/1950 MRN [id number]   Location [facility] 5NW-A Attending No att. providers found   Hosp Day # 5 PCP Jess Esquivel MD     Subjective:  No acute events overnight.  Patient continues to have ostomy output.  She denies any nausea or vomiting and is tolerating diet.  She reports feeling well today.    Objective/Physical Exam:      Intake/Output Summary (Last 24 hours) at 2025 1906  Last data filed at 2025 1600  Gross per 24 hour   Intake --   Output 2775 ml   Net -2775 ml       Vital Signs:  Blood pressure 154/63, pulse 65, temperature 99.9 °F (37.7 °C), temperature source Oral, resp. rate 21, height 61\", weight 102 lb 1.2 oz (46.3 kg), SpO2 97%, not currently breastfeeding.    General: Alert, orientated x3.  Cooperative.  No apparent distress.  HEENT: Exam is unremarkable.  Without scleral icterus.  Mucous membranes are moist. Oropharynx is clear.  Neck: No JVD. Supple.   Lungs: Non labored breathing, equal chest rise  Cardiac: Regular rate and rhythm. No murmur.  Abdomen:  Soft, non-distended, non-tender, with no rebound or guarding.  No peritoneal signs.  Left-sided ostomy with stool  Extremities:  No lower extremity edema noted.  Without clubbing or cyanosis.  2+ pulses x4, motor and sensation grossly intact      Labs:     Lab Results   Component Value Date    K 4.4 2025     Lab Results   Component Value Date    MG 1.4 2025    PHOS 2.6 2025       Images:  No results found.    Assessment/Plan:  Problem List[1]    75 year old female with small bowel obstruction    Overall, patient is doing well  She is tolerating soft diet and having adequate ostomy output  No acute surgical intervention  Patient is cleared for discharge from a surgical standpoint  She can follow-up as needed with [facility] general surgery    Thank you for letting me participate in the care of this patient    Najma Sagastume  MD [facility]  5/4/2025  7:06 PM         [1]   Patient Active Problem List  Diagnosis    Hydronephrosis, right    Colostomy in place (HCC)    Stage 3a chronic kidney disease (HCC)    Elevated lipoprotein A level    Hyperlipidemia    Elevated blood pressure reading    Insomnia    Frequent diarrhea    Heart murmur    Thrombocytopenia    SBO (small bowel obstruction) (HCC)    Primary hypertension    Iron deficiency anemia secondary to inadequate dietary iron intake    Hypernatremia    JAMES (acute kidney injury)    Hydronephrosis due to obstruction of ureter    History of cervical cancer

## 2025-05-06 NOTE — PROGRESS NOTES
JOSE assist.    TCC/PCP request.  Thursday 6/5 @9:20  Existing appointment with PCP.  Patient is happy with existing appointment.    Confirmed with patient's PoA.    Closing encounter.

## 2025-05-09 NOTE — DISCHARGE SUMMARY
[facility]  [facility]GE [facility]     Anmol Gonzalez Patient Status:  Inpatient    2/15/1950 MRN [id number]   Location [facility] 5NW-A Attending No att. providers found   Hosp Day # 5 PCP Jess Esquivel MD     Date of Admission:  2025  Date of Discharge:   2025    Discharge Disposition: Home or Self Care    Discharge Diagnosis:    #SBO  #Right hydroureteronephrosis  #JAMES on CKD   #Metabolic Acidosis  #HTN  #HLD  #Hypernatremia  #Hypokalemia      History of Present Illness:    Anmol Gonzalez is a 75 year old female was just in the hospital for small bowel obstruction and discharged after NG tube was removed and tolerated a diet.  She was discharged 2 days ago but then had reoccurrence of pain in her stomach diffusely this morning.  Along with intractable retching.  Last time her ostomy was changed was last night with stool removed.     Brief Synopsis:    The patient was admitted due to small bowel obstruction and right hydroureteronephrosis.  She had nephrostomy tube exchange.  Her bowel obstruction still improved.  NG tube was able to removed and her diet was advanced.  There was some concerns of possible pyelonephritis though this was ruled out and urine culture was negative.  She remained stable off of antibiotics.  She did have JAMES on CKD which steadily  improved with fluids.  She was very stable for discharge home with plans to have repeat blood work done as outpatient to continue to monitor her electrolytes and renal function.    All diagnosis' and recommendations discussed with patient and/or family in detail.      Lace+ Score: 64  59-90 High Risk  29-58 Medium Risk  0-28   Low Risk       TCM Follow-Up Recommendation:  LACE > 58: High Risk of readmission after discharge from the hospital.    Procedures during hospitalization:   Nephrostomy tube exchange    Consultants:  Surgery  urology    Discharge Medication List:     Discharge Medications        CONTINUE taking these medications         Instructions Prescription details   Coenzyme Q10 100 MG Caps      Take 300 mg by mouth in the morning.   Refills: 0     Ferrous Sulfate 325 (65 Fe) MG Tabs      Take 1 tablet (325 mg total) by mouth daily with breakfast.   Quantity: 90 tablet  Refills: 0     loperamide 2 MG Caps  Commonly known as: Imodium      Take 1 capsule (2 mg total) by mouth as needed.   Refills: 0     metoprolol succinate ER 25 MG Tb24  Commonly known as: Toprol XL      Take 1 tablet (25 mg total) by mouth in the morning.   Refills: 0     omega-3 fatty acids 1000 MG Caps  Commonly known as: Fish Oil      Take 1,000 mg by mouth in the morning.   Refills: 0     rosuvastatin 5 MG Tabs  Commonly known as: Crestor      Take 1 tablet (5 mg total) by mouth in the morning.   Refills: 0     sodium bicarbonate 650 MG Tabs      Take 1 tablet (650 mg total) by mouth in the morning and 1 tablet (650 mg total) before bedtime.   Refills: 0     VITAMIN C OR      Take by mouth in the morning.   Refills: 0     Zolpidem Tartrate ER 6.25 MG Tbcr  Commonly known as: AMBIEN CR      Take 1 tablet (6.25 mg total) by mouth nightly as needed for Sleep.   Quantity: 90 tablet  Refills: 0              [place]    Follow-up appointment:   Jess Esquivel MD  [address] [address] [phone]    Follow up      [facility], [address] [id number] [phone]  Follow up  As needed      Vital signs:       Physical Exam:    General: No acute distress   Lungs: clear to auscultation  Cardiovascular: S1, S2  Abdomen: Soft      -----------------------------------------------------------------------------------------------  PATIENT DISCHARGE INSTRUCTIONS: See electronic chart    Edmundo Varela MD    Total minutes spent on discharge plannin      The  [employer] Cures Act makes medical notes like these available to patients in the interest of transparency. Please be  advised this is a medical document. Medical documents are intended to carry relevant information, facts as evident, and the clinical opinion of the practitioner. The medical note is intended as peer to peer communication and may appear blunt or direct. It is written in medical language and may contain abbreviations or verbiage that are unfamiliar.

## 2025-05-15 NOTE — PROGRESS NOTES
PHYSICIAN CLARIFICATION    Additional information related to patient's SBO and recent surgical procedures.    SBO related to previous surgical procedures     This note is part of the patient's medical record.

## 2025-06-04 NOTE — PAT NURSING NOTE
PreOp Instructions     You are scheduled for: an Interventional Radiology Procedure     Date of Procedure: 06/26/25. CHECK IN AT 8:30 AM     Diet Instructions: Do not eat or drink anything after midnight including gum, mints, candy, etc.     Medications: Medications you are allowed to take can be taken with a sip of water the morning of your procedure     Medications to Stop: Hold herbal supplements and vitamins     Driving After Procedure: Sedation will be given so you WILL NOT be able to drive home. You will need a responsible adult  to drive you home. You can NOT take uber or taxi unless approved by your physician in advance.     Discharge Teaching: Your nurse will give you specific instructions before discharge, Most people can resume normal activities in 2-3 days, Any questions, please call the physician's office

## 2025-06-09 ENCOUNTER — TELEPHONE (OUTPATIENT)
Dept: INTERNAL MEDICINE CLINIC | Facility: CLINIC | Age: 75
End: 2025-06-09

## 2025-06-09 DIAGNOSIS — G47.00 INSOMNIA, UNSPECIFIED TYPE: Primary | ICD-10-CM

## 2025-06-09 NOTE — TELEPHONE ENCOUNTER
Spoke to family. Spoke to provider. Sleep center for medicine req form rec signed from provider. Faxed to number on form. Gave number for them to call as I believe they had [facility] number. Original signed form up front for pickup. Copy sent for scanning. Patient has hfu scheduled for tomorrow for admission early may.    Future Appointments   Date Time Provider Department Center   6/10/2025  9:40 AM Jess Esquivel MD [facility] [address] [place]   6/26/2025  9:30 AM [facility] IVS IR [facility]   7/10/2025  1:20 PM Silvia Ruffin APRN [facility] [address] [place]   12/1/2025  9:00 AM Jordan Landin MD [id number]   1/29/2026  1:30 PM Leonela Kay MD [facility] [place]         Family asking if this is needed at this time or wait until eval at sleep center. Advised we always recommend following up after hospital stay but will send message to provider and if appoinment can wait will give them call back otherwise plan on appoinment tomorrow. Thanks!

## 2025-06-09 NOTE — TELEPHONE ENCOUNTER
She needs to see the sleep specialist. Is this at the center for sleep medicine? Okay to place a referral but need to know where she is going.

## 2025-06-09 NOTE — TELEPHONE ENCOUNTER
Doing okay, no needs or concerns they were just following up. Cancelled tomorrows appoinment. Going to sleep center end of month. They already have awv schedule with franki oshea. They will discuss with franki oshea at that visit regarding sleep center. Fyi-thanks!    Future Appointments   Date Time Provider Department Center   6/10/2025  9:40 AM Jess Esquivel MD [facility] [address] [place]   6/26/2025  9:30 AM  IVS IR [facility]   7/10/2025  1:20 PM Silvia Ruffin APRN [facility] [address] [place]   12/1/2025  9:00 AM Jordan Landin MD [facility] [id number]   1/29/2026  1:30 PM Leonela Kay MD [facility] [place]

## 2025-06-09 NOTE — TELEPHONE ENCOUNTER
Patients daughter called because she reached out to the center for sleep medicine and they said they needed either an order for a sleep study or if Dr Esquivel wants her to see a sleep doctor she needs a referral. Is this something we can place for the patient? Please advise thanks!

## 2025-06-10 ENCOUNTER — MED REC SCAN ONLY (OUTPATIENT)
Dept: INTERNAL MEDICINE CLINIC | Facility: CLINIC | Age: 75
End: 2025-06-10

## 2025-06-26 ENCOUNTER — HOSPITAL ENCOUNTER (OUTPATIENT)
Dept: INTERVENTIONAL RADIOLOGY/VASCULAR | Facility: HOSPITAL | Age: 75
Discharge: HOME | End: 2025-06-26
Attending: UROLOGY | Admitting: UROLOGY
Payer: MEDICARE

## 2025-06-26 ENCOUNTER — TELEPHONE (OUTPATIENT)
Dept: SURGERY | Facility: CLINIC | Age: 75
End: 2025-06-26

## 2025-06-26 VITALS
SYSTOLIC BLOOD PRESSURE: 148 MMHG | HEART RATE: 60 BPM | OXYGEN SATURATION: 96 % | RESPIRATION RATE: 10 BRPM | DIASTOLIC BLOOD PRESSURE: 66 MMHG | TEMPERATURE: 98 F

## 2025-06-26 DIAGNOSIS — N13.30 BILATERAL HYDRONEPHROSIS: Primary | ICD-10-CM

## 2025-06-26 DIAGNOSIS — N13.30 HYDRONEPHROSIS: ICD-10-CM

## 2025-06-26 LAB — INR: 1.1 (ref 0.8–1.3)

## 2025-06-26 PROCEDURE — 99152 MOD SED SAME PHYS/QHP 5/>YRS: CPT | Performed by: RADIOLOGY

## 2025-06-26 PROCEDURE — 50435 EXCHANGE NEPHROSTOMY CATH: CPT | Performed by: RADIOLOGY

## 2025-06-26 RX ORDER — LIDOCAINE HYDROCHLORIDE 10 MG/ML
INJECTION, SOLUTION INFILTRATION; PERINEURAL
Status: COMPLETED
Start: 2025-06-26 | End: 2025-06-26

## 2025-06-26 RX ORDER — ONDANSETRON 2 MG/ML
4 INJECTION INTRAMUSCULAR; INTRAVENOUS ONCE
Status: COMPLETED | OUTPATIENT
Start: 2025-06-26 | End: 2025-06-26

## 2025-06-26 RX ORDER — SODIUM CHLORIDE 9 MG/ML
INJECTION, SOLUTION INTRAVENOUS CONTINUOUS
Status: DISCONTINUED | OUTPATIENT
Start: 2025-06-26 | End: 2025-06-26

## 2025-06-26 RX ORDER — IOPAMIDOL 612 MG/ML
30 INJECTION, SOLUTION INTRAVASCULAR
Status: COMPLETED | OUTPATIENT
Start: 2025-06-26 | End: 2025-06-26

## 2025-06-26 RX ORDER — MIDAZOLAM HYDROCHLORIDE 1 MG/ML
INJECTION INTRAMUSCULAR; INTRAVENOUS
Status: COMPLETED
Start: 2025-06-26 | End: 2025-06-26

## 2025-06-26 RX ORDER — LEVOFLOXACIN 5 MG/ML
500 INJECTION, SOLUTION INTRAVENOUS ONCE
Status: DISCONTINUED | OUTPATIENT
Start: 2025-06-26 | End: 2025-06-26

## 2025-06-26 RX ORDER — ONDANSETRON 2 MG/ML
INJECTION INTRAMUSCULAR; INTRAVENOUS
Status: COMPLETED
Start: 2025-06-26 | End: 2025-06-26

## 2025-06-26 RX ADMIN — IOPAMIDOL 5 ML: 612 INJECTION, SOLUTION INTRAVASCULAR at 09:54:00

## 2025-06-26 RX ADMIN — SODIUM CHLORIDE: 9 INJECTION, SOLUTION INTRAVENOUS at 09:11:00

## 2025-06-26 RX ADMIN — ONDANSETRON 4 MG: 2 INJECTION INTRAMUSCULAR; INTRAVENOUS at 10:30:00

## 2025-06-26 NOTE — PROCEDURES
[facility]   part of Located within [facility]  Procedure Note    Anmol Gonzalez Patient Status:  Outpatient    2/15/1950 MRN [id number]   Location [facility] INTERVENTIONAL SUITES Attending Raymundo Mccormick MD    Day # 0 PCP Jess Esquivel MD     Procedure: Right nephrostomy tube exchange    Pre-Procedure Diagnosis:  Routine exchange    Post-Procedure Diagnosis: Same    Anesthesia:  Sedation    Findings:  No complication    Specimens: None    Blood Loss:  < 5 cc    Tourniquet Time: None  Complications:  None  Drains:  None    Secondary Diagnosis:  N/A    Bib Mejia MD  2025

## 2025-06-26 NOTE — DISCHARGE INSTRUCTIONS
Keep the nephrostomy dressing(s) clean and dry- Do NOT get it wet.  When you shower you must cover the site and not get it wet.  Do not have the shower water hitting the area.     Change the dressing to your back when it becomes saturated/as needed.  Wash your hands with soap and water for 20 seconds before you handle the dressing.     Empty the nephrostomy bag as needed.      Change the nephrostomy bag once a month.     Notify the doctor of any signs of infection including a temperature greater than 101 degrees, chills, redness, swelling, drainage/foul odor from incision site.     No driving for 24 hours    No alcohol for 24 hours.     In 3 months you will need to get your nephrostomy tube exchanged- this appointment will be set up with your nephrologist/urologist    Do not make any personal/business decisions and/or sign any legal documents for the next 24 hours.

## 2025-06-26 NOTE — H&P
[facility]   part of [facility]   History & Physical    Anmol Gonzalez Patient Status:  Outpatient    2/15/1950 MRN [id number]   Location [facility] INTERVENTIONAL SUITES Attending Raymundo Mccormick MD   Hosp Day # 0 PCP Jess Esquivel MD     Admitting Diagnosis:   75 year-old female with right nephrostomy tube, here for routine exchange    History of Present Illness:   75 year-old female with right nephrostomy tube, here for routine exchange    History   Past Medical History:  Past Medical History[1]    Past Surgical History:  Past Surgical History[2]    Social History:  Social History     Tobacco Use    Smoking status: Never     Passive exposure: Never    Smokeless tobacco: Never   Substance Use Topics    Alcohol use: Never        Family History:  Family History[3]    Allergies/Medications:   Allergies:  Allergies[4]    Medications:  Medications - Current[5]    Physical Exam & Review of Systems:   Physical Exam:    /59 (BP Location: Right arm)   Pulse 55   Temp 98.1 °F (36.7 °C) (Temporal)   Resp 12   SpO2 99%     General: NAD  Neck: No JVD  Cardiac: Normal  Rate  Lungs: Non-labored respirations  Abdomen: Nondistended  Neuro: Alert and oriented    ASA: III  Mallampati: II    Results:   Labs:  No results for input(s): \"RBC\", \"HGB\", \"HCT\", \"MCV\", \"MCH\", \"MCHC\", \"RDW\", \"NEPRELIM\", \"WBC\", \"PLT\" in the last 168 hours.  No results for input(s): \"PTP\", \"INR\", \"PTT\" in the last 168 hours.  No results for input(s): \"GLU\", \"BUN\", \"CREATSERUM\", \"GFRAA\", \"GFRNAA\", \"CA\", \"NA\", \"K\", \"CL\", \"CO2\" in the last 168 hours.    Assessment/Plan:   Impression: 75 year-old female with right nephrostomy tube, here for routine exchange    I have discussed with the patient and/or legal representative the potential benefits, risks, and side effects of this procedure, the likelihood of the patient achieving goals; and the potential problems that might occur during recuperation.  I discussed reasonable alternatives to the  procedure, including risks, benefits and side effects related to the alternatives, and risks related to not receiving this procedure.    Recommendations: Nephrostomy tube exchange    Bib Mejia MD  6/26/2025  8:47 AM           [1]   Past Medical History:   Cancer (HCC)    cervicle    CKD (chronic kidney disease)    Exposure to medical diagnostic radiation    High blood pressure    High cholesterol    History of blood transfusion    Personal history of antineoplastic chemotherapy   [2]   Past Surgical History:  Procedure Laterality Date    Other surgical history      BLAADER REMOVAL    Part removal colon w end colostomy     [3]   Family History  Problem Relation Age of Onset    Stroke Father     Stroke Mother    [4]   Allergies  Allergen Reactions    Famotidine NAUSEA AND VOMITING   [5] No current outpatient medications on file.

## 2025-06-26 NOTE — TELEPHONE ENCOUNTER
Phoned Patient to discuss below.  No answer, left voice message to call [phone] ext. [phone].

## 2025-06-26 NOTE — TELEPHONE ENCOUNTER
RN rec'd return call from Steph/MANDA requesting a \"standing Order\" for Q 2-3 Month Nephrostomy Tube exchanges due to history of Neph Tube Malfunction.  Patient had tube replaced this today in IR.  Steph reported that order  last year.  Patient last office visit 24    SORIN March:  For your review and renewal if appropriate.

## 2025-06-26 NOTE — PROGRESS NOTES
Pt received s/p NT exchange with Dr. Mejia. Right NT draining clear yellow urine. Pt nauseated during recovery, zofran dose given. Recovery complete. Discharge instructions given to pt and pt's daughter. IV discontinued, pt taken down to [facility] via wheelchair for discharge.

## 2025-06-27 NOTE — TELEPHONE ENCOUNTER
RN Rec'd written orders from SORIN March for Q 2-3 Nephrostomy tube exchange.  Phoned [facility] Interventional Radiology to notify.  No answer, left voice message to call [phone] ext. [phone].

## 2025-06-27 NOTE — TELEPHONE ENCOUNTER
RN phoned Patient's POA to schedule Follow-up visit for Nephrostomy tube. Patient over-due, Nephrostomy tube placed 6/26. Patient's POA  agreeable with plan.

## 2025-07-09 ENCOUNTER — OFFICE VISIT (OUTPATIENT)
Dept: SURGERY | Facility: CLINIC | Age: 75
End: 2025-07-09
Payer: MEDICARE

## 2025-07-09 DIAGNOSIS — N13.30 HYDRONEPHROSIS, RIGHT: Primary | ICD-10-CM

## 2025-07-09 PROCEDURE — 99214 OFFICE O/P EST MOD 30 MIN: CPT | Performed by: UROLOGY

## 2025-07-09 NOTE — PROGRESS NOTES
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    Patient initially saw me in July 2024.  Please see that note for full details of her presentation to me.  I discussed with her outside urologist at that time to obtain full records of her surgical history.    In brief, patient had a pelvic exenteration for a history of cervical cancer.  Extensive radiation was noted.  Had a large vesicovaginal fistula.  Later had a cystectomy and a left to right transureteroureterostomy.  She had a right sided cutaneous ureterostomy due to lack of safe bowel or colon for conduit.  It appears the left ureter was brought to the skin and the right ureter was plugged into the mid left ureter.  She then had issues with obstruction and a slight atrophy of her left kidney.  She was then maintained with a right-sided nephrostomy tube for the last couple years.  When she first saw me, patient and family at that time did not want any large interventions for her complicated urologic history and wanted to maintain nephrostomy tube.    Unfortunately, patient has had 2 admissions recently to the hospital for possible small bowel obstruction which has been managed with conservative care.  She was admitted in April and May of this year.  On my review, her CT scan in April showed the above small bowel obstruction but also moderate left hydronephrosis which was new.  This was thought to maybe be due to her intestinal dilation.  Urology was not consulted at that time.  When she  was again admitted later in the month, CT scan again was done which showed new hydronephrosis on the right, stable hydronephrosis on the left.  We were consulted at that time.  I saw patient at that time.  She had been doing nephrostomy tube changes every 2 months, however this has been 2-1/2 months.  I had a conversation with radiology on if we should place bilateral nephrostomy tubes or just replace the right side (it is thought that all urine refluxes up into this kidney and drains out of her nephrostomy tube on the side).  Patient and family wanted to avoid bilateral nephrostomy tubes and after detailed conversation  we proceeded with a right sided nephrostomy tube exchange.  This went well and was completed on 4/30/2025.  Her JAMES improved.  She has had no admissions since that time.  Her last nephrostomy tube exchange was done on 6/26/2025; nephrostomy tube was exchanged without any issues.  On my review, there was no significant hydronephrosis at time of change.  8 Mauritian tube was placed.  She presents for follow-up today.  She is doing well.  No interval infections.  Nephrostomy tube is draining very well.  No flank pain.  No gross hematuria.  Is having normal bowel movements now.        PSA:  No results found for: \"PSA\", \"PERCENTPSA\", \"PSAS\", \"PSAULTRA\", \"QPSA\", \"PSATOT\", \"TOTPSADX\", \"TOTPSASCREEN\"     History:     Past Medical History:    Cancer (HCC)    cervicle    CKD (chronic kidney disease)    Exposure to medical diagnostic radiation    High blood pressure    High cholesterol    History of blood transfusion    Personal history of antineoplastic chemotherapy       Past Surgical History:   Procedure Laterality Date    Other surgical history      BLAADER REMOVAL    Part removal colon w end colostomy         Family History   Problem Relation Age of Onset    Stroke Father     Stroke Mother        Social History     Socioeconomic History    Marital status:    Tobacco Use    Smoking status: Never      Passive exposure: Never    Smokeless tobacco: Never   Vaping Use    Vaping status: Never Used   Substance and Sexual Activity    Alcohol use: Never    Drug use: Never   Other Topics Concern    Caffeine Concern No    Exercise No    Seat Belt No    Special Diet No    Stress Concern No    Weight Concern No     Social Drivers of Health     Food Insecurity: No Food Insecurity (4/29/2025)    NCSS - Food Insecurity     Worried About Running Out of Food in the Last Year: No     Ran Out of Food in the Last Year: No   Transportation Needs: No Transportation Needs (4/29/2025)    NCSS - Transportation     Lack of Transportation: No   Housing Stability: Not At Risk (4/29/2025)    NCSS - Housing/Utilities     Has Housing: Yes     Worried About Losing Housing: No     Unable to Get Utilities: No       Medications (Active prior to today's visit):  Current Outpatient Medications   Medication Sig Dispense Refill    loperamide 2 MG Oral Cap Take 1 capsule (2 mg total) by mouth as needed.      metoprolol succinate ER 25 MG Oral Tablet 24 Hr Take 1 tablet (25 mg total) by mouth in the morning.      Zolpidem Tartrate ER 6.25 MG Oral Tab CR Take 1 tablet (6.25 mg total) by mouth nightly as needed for Sleep. 90 tablet 0    rosuvastatin 5 MG Oral Tab Take 1 tablet (5 mg total) by mouth in the morning.      Ferrous Sulfate 325 (65 Fe) MG Oral Tab Take 1 tablet (325 mg total) by mouth daily with breakfast. 90 tablet 0    Ascorbic Acid (VITAMIN C OR) Take by mouth in the morning.      Coenzyme Q10 100 MG Oral Cap Take 300 mg by mouth in the morning.      omega-3 fatty acids 1000 MG Oral Cap Take 1,000 mg by mouth in the morning.         Allergies:  Allergies   Allergen Reactions    Famotidine NAUSEA AND VOMITING       Review of Systems:   A comprehensive 10-point review of systems was completed.  Pertinent positives and negatives are noted in the the HPI.    Physical Exam:   CONSTITUTIONAL: Well developed, well nourished, in no acute  distress  ABDOMEN: Soft, non-tender, non-distended   Right nephrostomy tube site clean, tube is draining yellow urine.    Assessment & Plan:     Anmol Gonzalez is a 75 year old female with history of CKD, atrophic L kidney, referred for hx of VV fistula sp complex reconstruction (cystectomy, cutaneous ureterostomy with trans U-U) and right hydronephrosis managed by nephrostomy tube.      Patient doing very well.  In the past, she has had issues when her nephrostomy tube is exchanged more than 2 months interval.  We will therefore proceed with ongoing 2-month nephrostomy tube changes.  Next exchange is ordered.  Patient should return to see me in 6 months for routine follow-up.  She has been having nephrostomy tube supplies with home health.  Patient and family again do not want to see a reconstructive urologist or have any further interventions at this time, I think this is reasonable as given her complex surgical history and radiation history she may not be a candidate for any further reconstructions.  They will let me know if they would like to see someone in the future.    In total, 30 minutes were spent on this patient encounter (including chart review, patient history, physical, and counseling, documentation, and communication).    Raymundo Mccormick MD  Staff Urologist  [facility]  Office: [phone]

## 2025-07-10 ENCOUNTER — OFFICE VISIT (OUTPATIENT)
Dept: INTERNAL MEDICINE CLINIC | Facility: CLINIC | Age: 75
End: 2025-07-10
Payer: MEDICARE

## 2025-07-10 VITALS
OXYGEN SATURATION: 98 % | HEART RATE: 64 BPM | HEIGHT: 60.67 IN | WEIGHT: 98.5 LBS | SYSTOLIC BLOOD PRESSURE: 130 MMHG | BODY MASS INDEX: 18.84 KG/M2 | RESPIRATION RATE: 16 BRPM | DIASTOLIC BLOOD PRESSURE: 64 MMHG | TEMPERATURE: 98 F

## 2025-07-10 DIAGNOSIS — E55.9 VITAMIN D DEFICIENCY: ICD-10-CM

## 2025-07-10 DIAGNOSIS — Z93.3 COLOSTOMY IN PLACE (HCC): ICD-10-CM

## 2025-07-10 DIAGNOSIS — N18.31 STAGE 3A CHRONIC KIDNEY DISEASE (HCC): ICD-10-CM

## 2025-07-10 DIAGNOSIS — D50.8 IRON DEFICIENCY ANEMIA SECONDARY TO INADEQUATE DIETARY IRON INTAKE: Chronic | ICD-10-CM

## 2025-07-10 DIAGNOSIS — Z78.0 POSTMENOPAUSAL: ICD-10-CM

## 2025-07-10 DIAGNOSIS — I10 PRIMARY HYPERTENSION: Chronic | ICD-10-CM

## 2025-07-10 DIAGNOSIS — E78.2 MIXED HYPERLIPIDEMIA: ICD-10-CM

## 2025-07-10 DIAGNOSIS — Z87.19 HISTORY OF SMALL BOWEL OBSTRUCTION: ICD-10-CM

## 2025-07-10 DIAGNOSIS — Z13.29 SCREENING FOR THYROID DISORDER: ICD-10-CM

## 2025-07-10 DIAGNOSIS — E53.8 B12 DEFICIENCY: ICD-10-CM

## 2025-07-10 DIAGNOSIS — G47.00 INSOMNIA, UNSPECIFIED TYPE: ICD-10-CM

## 2025-07-10 DIAGNOSIS — M79.641 PAIN OF RIGHT HAND: ICD-10-CM

## 2025-07-10 DIAGNOSIS — N13.1 HYDRONEPHROSIS DUE TO OBSTRUCTION OF URETER: ICD-10-CM

## 2025-07-10 DIAGNOSIS — R01.1 HEART MURMUR: ICD-10-CM

## 2025-07-10 DIAGNOSIS — R19.7 FREQUENT DIARRHEA: ICD-10-CM

## 2025-07-10 DIAGNOSIS — Z00.00 ENCOUNTER FOR ANNUAL HEALTH EXAMINATION: Primary | ICD-10-CM

## 2025-07-10 DIAGNOSIS — I25.10 CORONARY ARTERY DISEASE INVOLVING NATIVE CORONARY ARTERY OF NATIVE HEART WITHOUT ANGINA PECTORIS: ICD-10-CM

## 2025-07-10 RX ORDER — ZOLPIDEM TARTRATE 10 MG/1
10 TABLET ORAL NIGHTLY PRN
COMMUNITY

## 2025-07-10 NOTE — PROGRESS NOTES
The following individual(s) verbally consented to be recorded using ambient AI listening technology and understand that they can each withdraw their consent to this listening technology at any point by asking the clinician to turn off or pause the recording:    Patient name: Anmol Gonzalez      Subjective:   Anmol Gonzalez is a 75 year old female who presents for a Medicare Subsequent Annual Wellness visit (Pt already had Initial Annual Wellness) and scheduled follow up of multiple significant but stable problems.     Lives with her daughter. She is independent with ADLs. Diet and exercise are fair to good. Vaccines reviewed. Wearing seat belt - does not drive. Feels safe at home. Mammo- does not want it. Does self breast exams. Dexa to be ordered- pt would like. Colon  cancer screening UTD. No smoking. No alcohol. No skin concerns. Sees a dentist routinely. Labs to be ordered/reviewed.    History of Present Illness  Anmol Gonzalez is a 75 year old female who presents for a routine follow-up visit. She is accompanied by her caregiver Christianne. She does not speak english and Christianne will translate for her. They decline  services.     She has been experiencing sleep disturbances and is awaiting a scheduled sleep test. She currently takes zolpidem 10 mg for sleep, as prescribed by a sleep center physician.    Her weight has decreased slightly from 102 pounds in April to 98 pounds currently, but she maintains a stable nutritional intake with adequate eating and drinking habits.    She has a colostomy with healthy surrounding skin and no wounds. She follows with gastroenterology.     HTN- - Stable. No headaches or vision changes. No SE to medication. Taking medication as prescribed.      She is on a statin for cholesterol management. No SE.     Anemia- on iron. No SE.     She experiences stiffness and reduced flexibility in her fingers, particularly affecting the last two fingers of one hand, with some recent improvement in  mobility.    She reports numbness and tingling in her legs, especially at night or after prolonged walking, which is alleviated by elevating her legs. She also experiences leg swelling, which worsens in the afternoon and evening.      History/Other:   Fall Risk Assessment:   She has been screened for Falls and is low risk.      Cognitive Assessment:   Abnormal  What day of the week is this?: Correct  What month is it?: Correct  What year is it?: Correct  Recall \"Ball\": Correct  Recall \"Flag\": Correct  Recall \"Tree\": Incorrect  MMSE SCORE: 23    Functional Ability/Status:   Anmol Gonzalez has some abnormal functions as listed below:  She has Driving difficulties based on screening of functional status.       Depression Screening (PHQ):  PHQ-2 SCORE: 0  , done 7/9/2025          Advanced Directives:   She does NOT have a Living Will. [Do you have a living will?: (Patient-Rptd) No]  She has a Power of  for Health Care on file in [facility].  Discussed Advance Care Planning with patient (and family/surrogate if present). Standard forms made available to patient in After Visit Summary.      Patient Active Problem List   Diagnosis    Hydronephrosis, right    Colostomy in place (HCC)    Stage 3a chronic kidney disease (HCC)    Elevated lipoprotein A level    Hyperlipidemia    Elevated blood pressure reading    Insomnia    Frequent diarrhea    Heart murmur    Thrombocytopenia    SBO (small bowel obstruction) (HCC)    Primary hypertension    Iron deficiency anemia secondary to inadequate dietary iron intake    Hypernatremia    JAMES (acute kidney injury)    Hydronephrosis due to obstruction of ureter    History of cervical cancer     Allergies:  She is allergic to famotidine.    Current Medications:  Outpatient Medications Marked as Taking for the 7/10/25 encounter (Office Visit) with Silvia Ruffin APRN   Medication Sig    loperamide 2 MG Oral Cap Take 1 capsule (2 mg total) by mouth as needed.    metoprolol succinate ER 25  MG Oral Tablet 24 Hr Take 1 tablet (25 mg total) by mouth in the morning.    Zolpidem Tartrate ER 6.25 MG Oral Tab CR Take 1 tablet (6.25 mg total) by mouth nightly as needed for Sleep.    rosuvastatin 5 MG Oral Tab Take 1 tablet (5 mg total) by mouth in the morning.    Ferrous Sulfate 325 (65 Fe) MG Oral Tab Take 1 tablet (325 mg total) by mouth daily with breakfast.    Ascorbic Acid (VITAMIN C OR) Take by mouth in the morning.    Coenzyme Q10 100 MG Oral Cap Take 300 mg by mouth in the morning.    omega-3 fatty acids 1000 MG Oral Cap Take 1,000 mg by mouth in the morning.       Medical History:  She  has a past medical history of Cancer (HCC), CKD (chronic kidney disease), Exposure to medical diagnostic radiation, Heart murmur, High blood pressure, High cholesterol, History of blood transfusion, and Personal history of antineoplastic chemotherapy.  Surgical History:  She  has a past surgical history that includes part removal colon w end colostomy; other surgical history; and Colectomy.   Family History:  Her family history includes Stroke in her father and mother.  Social History:  She  reports that she has never smoked. She has never been exposed to tobacco smoke. She has never used smokeless tobacco. She reports that she does not drink alcohol and does not use drugs.    Tobacco:  She has never smoked tobacco.    CAGE Alcohol Screen:   CAGE screening score of 0 on 7/9/2025, showing low risk of alcohol abuse.      Patient Care Team:  Jess Esquivel MD as PCP - General (Internal Medicine)    Review of Systems  GENERAL: feels well otherwise  SKIN: denies any unusual skin lesions  EYES: denies blurred vision or double vision  HEENT: denies nasal congestion, sinus pain or ST  LUNGS: denies shortness of breath with exertion  CARDIOVASCULAR: denies chest pain on exertion  GI: denies abdominal pain, denies heartburn  : denies dysuria, vaginal discharge or itching, no complaint of urinary incontinence   MUSCULOSKELETAL:  denies back pain  NEURO: denies headaches  PSYCHE: denies depression or anxiety  HEMATOLOGIC: no symptoms of anemia  ENDOCRINE: denies thyroid history  ALL/ASTHMA: denies hx of allergy or asthma    Objective:   Physical Exam  /64 (BP Location: Left arm, Patient Position: Sitting, Cuff Size: adult)   Pulse 64   Temp 97.6 °F (36.4 °C) (Temporal)   Resp 16   Ht 5' 0.67\" (1.541 m)   Wt 98 lb 8 oz (44.7 kg)   SpO2 98%   BMI 18.82 kg/m²   General appearance: alert, appears stated age, and cooperative  Head: Normocephalic, without obvious abnormality, atraumatic  Eyes: negative  Ears: normal TM's and external ear canals both ears  Neck: no adenopathy, supple, symmetrical, trachea midline, and thyroid not enlarged, symmetric, no tenderness/mass/nodules  Back: negative  Lungs: clear to auscultation bilaterally  Heart: S1, S2 normal, regular rate and rhythm  Abdomen: colostomy present, no abdominal tenderness   Extremities: no edema, no calve tenderness   Skin: no skin changes   Neurologic: Grossly normal    /64 (BP Location: Left arm, Patient Position: Sitting, Cuff Size: adult)   Pulse 64   Temp 97.6 °F (36.4 °C) (Temporal)   Resp 16   Ht 5' 0.67\" (1.541 m)   Wt 98 lb 8 oz (44.7 kg)   SpO2 98%   BMI 18.82 kg/m²  Estimated body mass index is 18.82 kg/m² as calculated from the following:    Height as of this encounter: 5' 0.67\" (1.541 m).    Weight as of this encounter: 98 lb 8 oz (44.7 kg).    Medicare Hearing Assessment:   Hearing Screening    Time taken: 7/10/2025  1:27 PM  Entry User: Fatimah Amaral MA  Screening Method: Finger Rub  Finger Rub Result: Pass               Assessment & Plan:   Anmol Gonzalez is a 75 year old female who presents for a Medicare Assessment.     1. Encounter for annual health examination (Primary)  -     CBC With Differential With Platelet; Future; Expected date: 07/10/2025  -     Comp Metabolic Panel (14); Future; Expected date:   - vaccines reviewed  - mammo  declined  - dexa ordered  - C scope UTD  - labs ordered  - no skin concerns.   - sees a dentist    2. Coronary artery disease involving native coronary artery of native heart without angina pectoris  - no symptoms  - continue to see cardiology    3. Heart murmur  - no swelling or sob  - continue to see cardiology    4. Primary hypertension  - - Stable. No headaches or vision changes. No SE to medication. Taking medication as prescribed.   - continue current medication     5. Mixed hyperlipidemia  -     Lipid Panel; Future; Expected date: 07/10/2025  - on statin per cards  - check labs    6. Stage 3a chronic kidney disease (HCC)  - continue good hydration. Check labs    7. Iron deficiency anemia secondary to inadequate dietary iron intake  -     Ferritin; Future; Expected date: 07/10/2025  -     Iron And Tibc; Future; Expected date: 07/10/2025  - continue iron  - check labs    8. History of small bowel obstruction  9. Colostomy in place ([facility])  10. Frequent diarrhea  - stable. Continue to follow with gastroenterology  - continue imodium as needed    11. Hydronephrosis due to obstruction of ureter  - stable. Continue to see urology    12. Insomnia, unspecified type  - continue management per sleep specialist    13. B12 deficiency  -     Vitamin B12; Future; Expected date: 07/10/2025  - check b12 due to neuropathy    14. Vitamin D deficiency  -     Vitamin D; Future; Expected date: 07/10/2025    15. Postmenopausal  -     XR DEXA BONE DENSITOMETRY (CPT=77080); Future; Expected date: 07/10/2025    16. Pain of right hand  - stiffness with slightly less rom and achiness to right hand. Is not severe- very mild  - CTM closely. If worsens may need to see ortho  - continue stretches and exercises    17. Screening for thyroid disorder  -     TSH W Reflex To Free T4; Future; Expected date: 07/10/2025    The patient indicates understanding of these issues and agrees to the plan.  Reinforced healthy diet, lifestyle, and  exercise.      No follow-ups on file.     Follow up in 1 year or sooner as needed    Silvia Ruffin, APRN, 7/10/2025     Supplementary Documentation:   General Health:  In the past six months, have you lost more than 10 pounds without trying?: (Patient-Rptd) 2 - No  Has your appetite been poor?: (Patient-Rptd) No  Type of Diet: (Patient-Rptd) Balanced  How does the patient maintain a good energy level?: (Patient-Rptd) Daily Walks  How would you describe your daily physical activity?: (Patient-Rptd) Light  How would you describe your current health state?: (Patient-Rptd) Fair  How do you maintain positive mental well-being?: (Patient-Rptd) Social Interaction  On a scale of 0 to 10, with 0 being no pain and 10 being severe pain, what is your pain level?: (Patient-Rptd) 0 - (None)  In the past six months, have you experienced urine leakage?: (Patient-Rptd) 0-No  At any time do you feel concerned for the safety/well-being of yourself and/or your children, in your home or elsewhere?: (Patient-Rptd) No  Have you had any immunizations at another office such as Influenza, Hepatitis B, Tetanus, or Pneumococcal?: (Patient-Rptd) No    Health Maintenance   Topic Date Due    Mammogram  Never done    DEXA Scan  Never done    Zoster Vaccines (2 of 3) 04/28/2017    COVID-19 Vaccine (5 - 2024-25 season) 09/01/2024    Influenza Vaccine (1) 10/01/2025    Colorectal Cancer Screening  04/26/2026    Annual Physical  07/10/2026    Annual Depression Screening  Completed    Fall Risk Screening (Annual)  Completed    Pneumococcal Vaccine: 50+ Years  Completed    Meningococcal B Vaccine  Aged Out

## 2025-07-10 NOTE — PATIENT INSTRUCTIONS
Check with your insurance to verify coverage for the Shingrix vaccine for shingles. Also check to see where you can go to get the vaccine. You can also get a price check at the pharmacy instead.      Flu shot recommended.  Mid October    COVID vaccine recommended     Get your labs done. You should be fasting for at least 10 hours. If you take a multivitamin with Biotin or any biotin product it should be held for 3 days prior to getting your labs done.     Get your DEXA scan completed    Continue to see the specialists    Continue your current medication    Follow-up in 1 year or sooner as needed    VISIT SUMMARY:  Today, you had a routine follow-up visit where we discussed your sleep disturbances, weight, and overall health. We reviewed your current medications and addressed several health concerns, including your cholesterol levels, anemia, finger stiffness, leg swelling, and general health maintenance.    YOUR PLAN:  SLEEP DISORDER: You are experiencing sleep issues and are awaiting a sleep test.  -Continue follow-up with the sleep center for further evaluation and management.  -Continue taking zolpidem 10 mg as prescribed by the sleep center doctor.    HYPERLIPIDEMIA: You are on statin therapy for cholesterol management.  -We will order a cholesterol level test.  -Consult cardiology regarding your statin therapy management.  -We will provide a bridge prescription if cardiology is unavailable for refills.    IRON DEFICIENCY ANEMIA: You are on an iron supplement and need to monitor your iron levels.  -We will order a blood count to assess for anemia.  -We will check your iron levels with lab work.    TRIGGER FINGER: You have difficulty moving the last two fingers on one hand.  -Recommend gentle stretches to maintain mobility.  -Consider referral to a hand specialist if symptoms worsen.    PERIPHERAL EDEMA: You experience leg swelling related to a kidney condition and varicose veins.  -Encourage the use of  compression stockings.  -Advise elevating your legs during the day to reduce swelling.  -Monitor your salt intake to manage swelling.    RESTLESS LEGS SYNDROME: You experience numbness and tingling in your legs, particularly at night.  -We will order a B12 level test to assess for deficiency.  -Consider B12 supplementation if levels are low.    VARICOSE VEINS: Contributing to leg swelling.  -Continue management with compression stockings and leg elevation.    COLOSTOMY: Your colostomy is managed well with healthy skin around it.  -Continue your current colostomy care regimen.    GENERAL HEALTH MAINTENANCE: You are due for several vaccines and a bone density x-ray.  -Consider getting the newer shingles vaccine after checking the price at the pharmacy.  -Schedule your flu shot for mid-October.  -Recommend getting a COVID booster due to your age and the time since your last dose.  -We will order a bone density x-ray.    Contains text generated by Jovana

## 2025-07-11 ENCOUNTER — LAB ENCOUNTER (OUTPATIENT)
Dept: LAB | Age: 75
End: 2025-07-11
Attending: NURSE PRACTITIONER
Payer: MEDICARE

## 2025-07-11 DIAGNOSIS — E53.8 B12 DEFICIENCY: ICD-10-CM

## 2025-07-11 DIAGNOSIS — E78.2 MIXED HYPERLIPIDEMIA: ICD-10-CM

## 2025-07-11 DIAGNOSIS — R74.01 ELEVATED AST (SGOT): ICD-10-CM

## 2025-07-11 DIAGNOSIS — Z00.00 ENCOUNTER FOR ANNUAL HEALTH EXAMINATION: ICD-10-CM

## 2025-07-11 DIAGNOSIS — N13.9 OBSTRUCTIVE UROPATHY: ICD-10-CM

## 2025-07-11 DIAGNOSIS — R74.01 ELEVATED ALT MEASUREMENT: ICD-10-CM

## 2025-07-11 DIAGNOSIS — E55.9 VITAMIN D DEFICIENCY: ICD-10-CM

## 2025-07-11 DIAGNOSIS — D64.9 ANEMIA, UNSPECIFIED TYPE: ICD-10-CM

## 2025-07-11 DIAGNOSIS — Z13.29 SCREENING FOR THYROID DISORDER: ICD-10-CM

## 2025-07-11 DIAGNOSIS — N18.30 STAGE 3 CHRONIC KIDNEY DISEASE, UNSPECIFIED WHETHER STAGE 3A OR 3B CKD (HCC): ICD-10-CM

## 2025-07-11 PROBLEM — N17.9 AKI (ACUTE KIDNEY INJURY): Status: RESOLVED | Noted: 2025-04-29 | Resolved: 2025-07-11

## 2025-07-11 PROBLEM — E78.41 ELEVATED LIPOPROTEIN A LEVEL: Status: RESOLVED | Noted: 2024-10-08 | Resolved: 2025-07-11

## 2025-07-11 PROBLEM — R03.0 ELEVATED BLOOD PRESSURE READING: Status: RESOLVED | Noted: 2024-10-08 | Resolved: 2025-07-11

## 2025-07-11 PROBLEM — I25.10 CORONARY ARTERY DISEASE INVOLVING NATIVE CORONARY ARTERY OF NATIVE HEART WITHOUT ANGINA PECTORIS: Status: ACTIVE | Noted: 2025-07-11

## 2025-07-11 PROBLEM — K56.609 SBO (SMALL BOWEL OBSTRUCTION) (HCC): Status: RESOLVED | Noted: 2025-04-24 | Resolved: 2025-07-11

## 2025-07-11 PROBLEM — D69.6 THROMBOCYTOPENIA: Chronic | Status: RESOLVED | Noted: 2024-12-19 | Resolved: 2025-07-11

## 2025-07-11 PROBLEM — Z85.41 HISTORY OF CERVICAL CANCER: Status: RESOLVED | Noted: 2025-05-01 | Resolved: 2025-07-11

## 2025-07-11 PROBLEM — Z87.19 HISTORY OF SMALL BOWEL OBSTRUCTION: Status: ACTIVE | Noted: 2025-07-11

## 2025-07-11 PROBLEM — E87.0 HYPERNATREMIA: Status: RESOLVED | Noted: 2025-04-26 | Resolved: 2025-07-11

## 2025-07-11 PROBLEM — N13.30 HYDRONEPHROSIS, RIGHT: Status: RESOLVED | Noted: 2024-10-08 | Resolved: 2025-07-11

## 2025-07-11 LAB
ALBUMIN SERPL-MCNC: 4.3 G/DL (ref 3.2–4.8)
ALBUMIN/GLOB SERPL: 1.3 {RATIO} (ref 1–2)
ALP LIVER SERPL-CCNC: 51 U/L (ref 55–142)
ALT SERPL-CCNC: 119 U/L (ref 10–49)
ANION GAP SERPL CALC-SCNC: 5 MMOL/L (ref 0–18)
AST SERPL-CCNC: 67 U/L (ref ?–34)
BASOPHILS # BLD AUTO: 0.03 X10(3) UL (ref 0–0.2)
BASOPHILS NFR BLD AUTO: 0.8 %
BILIRUB DIRECT SERPL-MCNC: 0.2 MG/DL (ref ?–0.3)
BILIRUB SERPL-MCNC: 0.8 MG/DL (ref 0.2–1.1)
BUN BLD-MCNC: 21 MG/DL (ref 9–23)
CALCIUM BLD-MCNC: 9.4 MG/DL (ref 8.7–10.6)
CHLORIDE SERPL-SCNC: 110 MMOL/L (ref 98–112)
CHOLEST SERPL-MCNC: 167 MG/DL (ref ?–200)
CO2 SERPL-SCNC: 28 MMOL/L (ref 21–32)
CREAT BLD-MCNC: 1.59 MG/DL (ref 0.55–1.02)
DEPRECATED HBV CORE AB SER IA-ACNC: 157 NG/ML (ref 50–306)
EGFRCR SERPLBLD CKD-EPI 2021: 34 ML/MIN/1.73M2 (ref 60–?)
EOSINOPHIL # BLD AUTO: 0.07 X10(3) UL (ref 0–0.7)
EOSINOPHIL NFR BLD AUTO: 2 %
ERYTHROCYTE [DISTWIDTH] IN BLOOD BY AUTOMATED COUNT: 13.7 %
FASTING PATIENT LIPID ANSWER: YES
FASTING STATUS PATIENT QL REPORTED: YES
GLOBULIN PLAS-MCNC: 3.2 G/DL (ref 2–3.5)
GLUCOSE BLD-MCNC: 86 MG/DL (ref 70–99)
HCT VFR BLD AUTO: 32.3 % (ref 35–48)
HDLC SERPL-MCNC: 65 MG/DL (ref 40–59)
HGB BLD-MCNC: 10.6 G/DL (ref 12–16)
IMM GRANULOCYTES # BLD AUTO: 0.01 X10(3) UL (ref 0–1)
IMM GRANULOCYTES NFR BLD: 0.3 %
IRON SATN MFR SERPL: 25 % (ref 15–50)
IRON SERPL-MCNC: 80 UG/DL (ref 50–170)
LDLC SERPL CALC-MCNC: 80 MG/DL (ref ?–100)
LYMPHOCYTES # BLD AUTO: 1.3 X10(3) UL (ref 1–4)
LYMPHOCYTES NFR BLD AUTO: 36.4 %
MCH RBC QN AUTO: 32.4 PG (ref 26–34)
MCHC RBC AUTO-ENTMCNC: 32.8 G/DL (ref 31–37)
MCV RBC AUTO: 98.8 FL (ref 80–100)
MONOCYTES # BLD AUTO: 0.29 X10(3) UL (ref 0.1–1)
MONOCYTES NFR BLD AUTO: 8.1 %
NEUTROPHILS # BLD AUTO: 1.87 X10 (3) UL (ref 1.5–7.7)
NEUTROPHILS # BLD AUTO: 1.87 X10(3) UL (ref 1.5–7.7)
NEUTROPHILS NFR BLD AUTO: 52.4 %
NONHDLC SERPL-MCNC: 102 MG/DL (ref ?–130)
OSMOLALITY SERPL CALC.SUM OF ELEC: 298 MOSM/KG (ref 275–295)
PLATELET # BLD AUTO: 202 10(3)UL (ref 150–450)
POTASSIUM SERPL-SCNC: 4.2 MMOL/L (ref 3.5–5.1)
PROT SERPL-MCNC: 7.5 G/DL (ref 5.7–8.2)
RBC # BLD AUTO: 3.27 X10(6)UL (ref 3.8–5.3)
SODIUM SERPL-SCNC: 143 MMOL/L (ref 136–145)
TOTAL IRON BINDING CAPACITY: 317 UG/DL (ref 250–425)
TRANSFERRIN SERPL-MCNC: 245 MG/DL (ref 250–380)
TRIGL SERPL-MCNC: 123 MG/DL (ref 30–149)
TSI SER-ACNC: 1.37 UIU/ML (ref 0.55–4.78)
VIT B12 SERPL-MCNC: 422 PG/ML (ref 211–911)
VIT D+METAB SERPL-MCNC: 34.1 NG/ML (ref 30–100)
VLDLC SERPL CALC-MCNC: 19 MG/DL (ref 0–30)
WBC # BLD AUTO: 3.6 X10(3) UL (ref 4–11)

## 2025-07-11 PROCEDURE — 82728 ASSAY OF FERRITIN: CPT

## 2025-07-11 PROCEDURE — 80061 LIPID PANEL: CPT

## 2025-07-11 PROCEDURE — 84443 ASSAY THYROID STIM HORMONE: CPT

## 2025-07-11 PROCEDURE — 82607 VITAMIN B-12: CPT

## 2025-07-11 PROCEDURE — 85025 COMPLETE CBC W/AUTO DIFF WBC: CPT

## 2025-07-11 PROCEDURE — 82248 BILIRUBIN DIRECT: CPT

## 2025-07-11 PROCEDURE — 82306 VITAMIN D 25 HYDROXY: CPT

## 2025-07-11 PROCEDURE — 36415 COLL VENOUS BLD VENIPUNCTURE: CPT

## 2025-07-11 PROCEDURE — 83550 IRON BINDING TEST: CPT

## 2025-07-11 PROCEDURE — 80053 COMPREHEN METABOLIC PANEL: CPT

## 2025-07-11 PROCEDURE — 83540 ASSAY OF IRON: CPT

## 2025-07-18 ENCOUNTER — MED REC SCAN ONLY (OUTPATIENT)
Dept: INTERNAL MEDICINE CLINIC | Facility: CLINIC | Age: 75
End: 2025-07-18

## 2025-08-19 ENCOUNTER — MED REC SCAN ONLY (OUTPATIENT)
Dept: INTERNAL MEDICINE CLINIC | Facility: CLINIC | Age: 75
End: 2025-08-19

## 2025-08-26 ENCOUNTER — APPOINTMENT (OUTPATIENT)
Dept: GENERAL RADIOLOGY | Facility: HOSPITAL | Age: 75
End: 2025-08-26
Attending: EMERGENCY MEDICINE

## 2025-08-26 ENCOUNTER — APPOINTMENT (OUTPATIENT)
Dept: CT IMAGING | Facility: HOSPITAL | Age: 75
End: 2025-08-26
Attending: EMERGENCY MEDICINE

## 2025-08-26 ENCOUNTER — HOSPITAL ENCOUNTER (INPATIENT)
Facility: HOSPITAL | Age: 75
LOS: 4 days | Discharge: HOME | End: 2025-08-30
Attending: EMERGENCY MEDICINE | Admitting: HOSPITALIST

## 2025-08-26 DIAGNOSIS — R11.2 NAUSEA AND VOMITING, UNSPECIFIED VOMITING TYPE: ICD-10-CM

## 2025-08-26 DIAGNOSIS — E83.42 HYPOMAGNESEMIA: ICD-10-CM

## 2025-08-26 DIAGNOSIS — E86.0 DEHYDRATION: ICD-10-CM

## 2025-08-26 DIAGNOSIS — E83.51 HYPOCALCEMIA: ICD-10-CM

## 2025-08-26 DIAGNOSIS — K56.609 SBO (SMALL BOWEL OBSTRUCTION) (HCC): Primary | ICD-10-CM

## 2025-08-26 DIAGNOSIS — E87.6 HYPOKALEMIA: ICD-10-CM

## 2025-08-26 LAB
ALBUMIN SERPL-MCNC: 2.8 G/DL (ref 3.2–4.8)
ALBUMIN/GLOB SERPL: 1.2 (ref 1–2)
ALP LIVER SERPL-CCNC: 34 U/L (ref 55–142)
ALT SERPL-CCNC: 13 U/L (ref 10–49)
AST SERPL-CCNC: 20 U/L (ref ?–34)
ATRIAL RATE: 74 BPM
BASOPHILS # BLD AUTO: 0.01 X10(3) UL (ref 0–0.2)
BASOPHILS NFR BLD AUTO: 0.1 %
BILIRUB SERPL-MCNC: 0.5 MG/DL (ref 0.2–1.1)
BILIRUB UR QL STRIP.AUTO: NEGATIVE
BUN BLD-MCNC: 10 MG/DL (ref 9–23)
CALCIUM BLD-MCNC: 6.8 MG/DL (ref 8.7–10.6)
CHLORIDE SERPL-SCNC: 115 MMOL/L (ref 98–112)
CO2 SERPL-SCNC: <10 MMOL/L (ref 21–32)
CREAT BLD-MCNC: 0.99 MG/DL (ref 0.55–1.02)
EGFRCR SERPLBLD CKD-EPI 2021: 60 ML/MIN/1.73M2 (ref 60–?)
EOSINOPHIL # BLD AUTO: 0.01 X10(3) UL (ref 0–0.7)
EOSINOPHIL NFR BLD AUTO: 0.1 %
ERYTHROCYTE [DISTWIDTH] IN BLOOD BY AUTOMATED COUNT: 13.2 %
GLOBULIN PLAS-MCNC: 2.4 G/DL (ref 2–3.5)
GLUCOSE BLD-MCNC: 104 MG/DL (ref 70–99)
GLUCOSE UR STRIP.AUTO-MCNC: NORMAL MG/DL
HCT VFR BLD AUTO: 35.6 % (ref 35–48)
HGB BLD-MCNC: 12.4 G/DL (ref 12–16)
IMM GRANULOCYTES # BLD AUTO: 0.02 X10(3) UL (ref 0–1)
IMM GRANULOCYTES NFR BLD: 0.2 %
KETONES UR STRIP.AUTO-MCNC: NEGATIVE MG/DL
LEUKOCYTE ESTERASE UR QL STRIP.AUTO: 500
LIPASE SERPL-CCNC: 29 U/L (ref 12–53)
LYMPHOCYTES # BLD AUTO: 1.96 X10(3) UL (ref 1–4)
LYMPHOCYTES NFR BLD AUTO: 21.6 %
MAGNESIUM SERPL-MCNC: 1.2 MG/DL (ref 1.6–2.6)
MCH RBC QN AUTO: 31.9 PG (ref 26–34)
MCHC RBC AUTO-ENTMCNC: 34.8 G/DL (ref 31–37)
MCV RBC AUTO: 91.5 FL (ref 80–100)
MONOCYTES # BLD AUTO: 0.25 X10(3) UL (ref 0.1–1)
MONOCYTES NFR BLD AUTO: 2.8 %
NEUTROPHILS # BLD AUTO: 6.81 X10 (3) UL (ref 1.5–7.7)
NEUTROPHILS # BLD AUTO: 6.81 X10(3) UL (ref 1.5–7.7)
NEUTROPHILS NFR BLD AUTO: 75.2 %
NITRITE UR QL STRIP.AUTO: NEGATIVE
OSMOLALITY SERPL CALC.SUM OF ELEC: 293 MOSM/KG (ref 275–295)
P AXIS: 76 DEGREES
P-R INTERVAL: 172 MS
PH UR STRIP.AUTO: 6 (ref 5–8)
PLATELET # BLD AUTO: 218 10(3)UL (ref 150–450)
POTASSIUM SERPL-SCNC: 2.5 MMOL/L (ref 3.5–5.1)
PROT SERPL-MCNC: 5.2 G/DL (ref 5.7–8.2)
PROT UR STRIP.AUTO-MCNC: 20 MG/DL
Q-T INTERVAL: 438 MS
QRS DURATION: 84 MS
QTC CALCULATION (BEZET): 486 MS
R AXIS: 57 DEGREES
RBC # BLD AUTO: 3.89 X10(6)UL (ref 3.8–5.3)
RBC #/AREA URNS AUTO: >10 /HPF
SODIUM SERPL-SCNC: 142 MMOL/L (ref 136–145)
SP GR UR STRIP.AUTO: 1.01 (ref 1–1.03)
T AXIS: 50 DEGREES
UROBILINOGEN UR STRIP.AUTO-MCNC: NORMAL MG/DL
VENTRICULAR RATE: 74 BPM
WBC # BLD AUTO: 9.1 X10(3) UL (ref 4–11)
WBC #/AREA URNS AUTO: >50 /HPF

## 2025-08-26 PROCEDURE — 71045 X-RAY EXAM CHEST 1 VIEW: CPT | Performed by: EMERGENCY MEDICINE

## 2025-08-26 PROCEDURE — 99223 1ST HOSP IP/OBS HIGH 75: CPT | Performed by: HOSPITALIST

## 2025-08-26 PROCEDURE — 99223 1ST HOSP IP/OBS HIGH 75: CPT | Performed by: SURGERY

## 2025-08-26 PROCEDURE — 74177 CT ABD & PELVIS W/CONTRAST: CPT | Performed by: EMERGENCY MEDICINE

## 2025-08-26 RX ORDER — ENOXAPARIN SODIUM 100 MG/ML
40 INJECTION SUBCUTANEOUS DAILY
Status: DISCONTINUED | OUTPATIENT
Start: 2025-08-26 | End: 2025-08-29

## 2025-08-26 RX ORDER — SODIUM CHLORIDE 9 MG/ML
INJECTION, SOLUTION INTRAVENOUS CONTINUOUS
Status: DISCONTINUED | OUTPATIENT
Start: 2025-08-26 | End: 2025-08-29

## 2025-08-26 RX ORDER — MORPHINE SULFATE 2 MG/ML
1 INJECTION, SOLUTION INTRAMUSCULAR; INTRAVENOUS EVERY 2 HOUR PRN
Status: DISCONTINUED | OUTPATIENT
Start: 2025-08-26 | End: 2025-08-30

## 2025-08-26 RX ORDER — MAGNESIUM SULFATE 1 G/100ML
1 INJECTION INTRAVENOUS ONCE
Status: COMPLETED | OUTPATIENT
Start: 2025-08-26 | End: 2025-08-26

## 2025-08-26 RX ORDER — SODIUM PHOSPHATE, DIBASIC AND SODIUM PHOSPHATE, MONOBASIC 7; 19 G/230ML; G/230ML
1 ENEMA RECTAL ONCE AS NEEDED
Status: DISCONTINUED | OUTPATIENT
Start: 2025-08-26 | End: 2025-08-30

## 2025-08-26 RX ORDER — METOCLOPRAMIDE HYDROCHLORIDE 5 MG/ML
5 INJECTION INTRAMUSCULAR; INTRAVENOUS EVERY 8 HOURS PRN
Status: DISCONTINUED | OUTPATIENT
Start: 2025-08-26 | End: 2025-08-30

## 2025-08-26 RX ORDER — BISACODYL 10 MG
10 SUPPOSITORY, RECTAL RECTAL
Status: DISCONTINUED | OUTPATIENT
Start: 2025-08-26 | End: 2025-08-30

## 2025-08-26 RX ORDER — METOCLOPRAMIDE HYDROCHLORIDE 5 MG/ML
10 INJECTION INTRAMUSCULAR; INTRAVENOUS EVERY 8 HOURS PRN
Status: DISCONTINUED | OUTPATIENT
Start: 2025-08-26 | End: 2025-08-26

## 2025-08-26 RX ORDER — HYDROMORPHONE HYDROCHLORIDE 1 MG/ML
1 INJECTION, SOLUTION INTRAMUSCULAR; INTRAVENOUS; SUBCUTANEOUS ONCE
Refills: 0 | Status: COMPLETED | OUTPATIENT
Start: 2025-08-26 | End: 2025-08-26

## 2025-08-26 RX ORDER — HYDRALAZINE HYDROCHLORIDE 20 MG/ML
10 INJECTION INTRAMUSCULAR; INTRAVENOUS EVERY 4 HOURS PRN
Status: DISCONTINUED | OUTPATIENT
Start: 2025-08-26 | End: 2025-08-30

## 2025-08-26 RX ORDER — SODIUM CHLORIDE 9 MG/ML
INJECTION, SOLUTION INTRAVENOUS CONTINUOUS
Status: DISCONTINUED | OUTPATIENT
Start: 2025-08-26 | End: 2025-08-28

## 2025-08-26 RX ORDER — MORPHINE SULFATE 4 MG/ML
4 INJECTION, SOLUTION INTRAMUSCULAR; INTRAVENOUS EVERY 2 HOUR PRN
Status: DISCONTINUED | OUTPATIENT
Start: 2025-08-26 | End: 2025-08-30

## 2025-08-26 RX ORDER — ONDANSETRON 2 MG/ML
4 INJECTION INTRAMUSCULAR; INTRAVENOUS EVERY 6 HOURS PRN
Status: DISCONTINUED | OUTPATIENT
Start: 2025-08-26 | End: 2025-08-30

## 2025-08-26 RX ORDER — MORPHINE SULFATE 2 MG/ML
2 INJECTION, SOLUTION INTRAMUSCULAR; INTRAVENOUS EVERY 2 HOUR PRN
Status: DISCONTINUED | OUTPATIENT
Start: 2025-08-26 | End: 2025-08-30

## 2025-08-26 RX ORDER — PANTOPRAZOLE SODIUM 40 MG/1
40 TABLET, DELAYED RELEASE ORAL
Status: DISCONTINUED | OUTPATIENT
Start: 2025-08-26 | End: 2025-08-30

## 2025-08-26 RX ORDER — BENZONATATE 100 MG/1
200 CAPSULE ORAL 3 TIMES DAILY PRN
Status: DISCONTINUED | OUTPATIENT
Start: 2025-08-26 | End: 2025-08-30

## 2025-08-26 RX ORDER — POLYETHYLENE GLYCOL 3350 17 G/17G
17 POWDER, FOR SOLUTION ORAL DAILY PRN
Status: DISCONTINUED | OUTPATIENT
Start: 2025-08-26 | End: 2025-08-30

## 2025-08-26 RX ORDER — ACETAMINOPHEN 500 MG
1000 TABLET ORAL EVERY 4 HOURS PRN
Status: DISCONTINUED | OUTPATIENT
Start: 2025-08-26 | End: 2025-08-30

## 2025-08-26 RX ORDER — MULTIVIT-MIN/IRON FUM/FOLIC AC 7.5 MG-4
1 TABLET ORAL DAILY
COMMUNITY

## 2025-08-26 RX ORDER — ONDANSETRON 2 MG/ML
4 INJECTION INTRAMUSCULAR; INTRAVENOUS ONCE
Status: COMPLETED | OUTPATIENT
Start: 2025-08-26 | End: 2025-08-26

## 2025-08-26 RX ORDER — SENNOSIDES 8.6 MG
17.2 TABLET ORAL NIGHTLY PRN
Status: DISCONTINUED | OUTPATIENT
Start: 2025-08-26 | End: 2025-08-30

## 2025-08-26 RX ORDER — METOPROLOL TARTRATE 1 MG/ML
5 INJECTION, SOLUTION INTRAVENOUS EVERY 6 HOURS
Status: DISCONTINUED | OUTPATIENT
Start: 2025-08-26 | End: 2025-08-30

## 2025-08-26 RX ORDER — SODIUM CHLORIDE 9 MG/ML
INJECTION, SOLUTION INTRAVENOUS CONTINUOUS
Status: ACTIVE | OUTPATIENT
Start: 2025-08-26 | End: 2025-08-26

## 2025-08-27 ENCOUNTER — APPOINTMENT (OUTPATIENT)
Facility: HOSPITAL | Age: 75
End: 2025-08-27
Attending: HOSPITALIST

## 2025-08-27 ENCOUNTER — TELEPHONE (OUTPATIENT)
Dept: INTERNAL MEDICINE CLINIC | Facility: CLINIC | Age: 75
End: 2025-08-27

## 2025-08-27 DIAGNOSIS — G47.69 SLEEP-RELATED MOVEMENT DISORDER: Primary | ICD-10-CM

## 2025-08-27 PROBLEM — C53.9 MALIGNANT NEOPLASM OF CERVIX (HCC): Status: ACTIVE | Noted: 2025-08-27

## 2025-08-27 LAB
ALBUMIN SERPL-MCNC: 3.6 G/DL (ref 3.2–4.8)
ALBUMIN/GLOB SERPL: 1.3 (ref 1–2)
ALP LIVER SERPL-CCNC: 41 U/L (ref 55–142)
ALT SERPL-CCNC: 11 U/L (ref 10–49)
ANION GAP SERPL CALC-SCNC: 13 MMOL/L (ref 0–18)
AST SERPL-CCNC: 23 U/L (ref ?–34)
BASOPHILS # BLD AUTO: 0.01 X10(3) UL (ref 0–0.2)
BASOPHILS NFR BLD AUTO: 0.4 %
BILIRUB SERPL-MCNC: 0.6 MG/DL (ref 0.2–1.1)
BUN BLD-MCNC: 16 MG/DL (ref 9–23)
CALCIUM BLD-MCNC: 8.7 MG/DL (ref 8.7–10.6)
CHLORIDE SERPL-SCNC: 113 MMOL/L (ref 98–112)
CO2 SERPL-SCNC: 20 MMOL/L (ref 21–32)
CREAT BLD-MCNC: 1.36 MG/DL (ref 0.55–1.02)
EGFRCR SERPLBLD CKD-EPI 2021: 41 ML/MIN/1.73M2 (ref 60–?)
EOSINOPHIL # BLD AUTO: 0.04 X10(3) UL (ref 0–0.7)
EOSINOPHIL NFR BLD AUTO: 1.4 %
ERYTHROCYTE [DISTWIDTH] IN BLOOD BY AUTOMATED COUNT: 13.9 %
GLOBULIN PLAS-MCNC: 2.7 G/DL (ref 2–3.5)
GLUCOSE BLD-MCNC: 93 MG/DL (ref 70–99)
HCT VFR BLD AUTO: 29.9 % (ref 35–48)
HGB BLD-MCNC: 9.7 G/DL (ref 12–16)
IMM GRANULOCYTES # BLD AUTO: 0 X10(3) UL (ref 0–1)
IMM GRANULOCYTES NFR BLD: 0 %
LYMPHOCYTES # BLD AUTO: 0.71 X10(3) UL (ref 1–4)
LYMPHOCYTES NFR BLD AUTO: 25.4 %
MAGNESIUM SERPL-MCNC: 2 MG/DL (ref 1.6–2.6)
MCH RBC QN AUTO: 31.6 PG (ref 26–34)
MCHC RBC AUTO-ENTMCNC: 32.4 G/DL (ref 31–37)
MCV RBC AUTO: 97.4 FL (ref 80–100)
MONOCYTES # BLD AUTO: 0.43 X10(3) UL (ref 0.1–1)
MONOCYTES NFR BLD AUTO: 15.4 %
NEUTROPHILS # BLD AUTO: 1.61 X10 (3) UL (ref 1.5–7.7)
NEUTROPHILS # BLD AUTO: 1.61 X10(3) UL (ref 1.5–7.7)
NEUTROPHILS NFR BLD AUTO: 57.4 %
OSMOLALITY SERPL CALC.SUM OF ELEC: 303 MOSM/KG (ref 275–295)
PHOSPHATE SERPL-MCNC: 3.1 MG/DL (ref 2.4–5.1)
PLATELET # BLD AUTO: 174 10(3)UL (ref 150–450)
POTASSIUM SERPL-SCNC: 3.3 MMOL/L (ref 3.5–5.1)
PROT SERPL-MCNC: 6.3 G/DL (ref 5.7–8.2)
RBC # BLD AUTO: 3.07 X10(6)UL (ref 3.8–5.3)
SODIUM SERPL-SCNC: 146 MMOL/L (ref 136–145)
WBC # BLD AUTO: 2.8 X10(3) UL (ref 4–11)

## 2025-08-27 PROCEDURE — 99232 SBSQ HOSP IP/OBS MODERATE 35: CPT | Performed by: HOSPITALIST

## 2025-08-27 PROCEDURE — 99232 SBSQ HOSP IP/OBS MODERATE 35: CPT | Performed by: SURGERY

## 2025-08-28 ENCOUNTER — APPOINTMENT (OUTPATIENT)
Dept: GENERAL RADIOLOGY | Facility: HOSPITAL | Age: 75
End: 2025-08-28

## 2025-08-28 LAB
ALBUMIN SERPL-MCNC: 3.9 G/DL (ref 3.2–4.8)
ALBUMIN/GLOB SERPL: 1.3 (ref 1–2)
ALP LIVER SERPL-CCNC: 47 U/L (ref 55–142)
ALT SERPL-CCNC: <7 U/L (ref 10–49)
ANION GAP SERPL CALC-SCNC: 19 MMOL/L (ref 0–18)
AST SERPL-CCNC: 25 U/L (ref ?–34)
BILIRUB SERPL-MCNC: 0.6 MG/DL (ref 0.2–1.1)
BUN BLD-MCNC: 13 MG/DL (ref 9–23)
CALCIUM BLD-MCNC: 9.6 MG/DL (ref 8.7–10.6)
CHLORIDE SERPL-SCNC: 116 MMOL/L (ref 98–112)
CO2 SERPL-SCNC: 15 MMOL/L (ref 21–32)
CREAT BLD-MCNC: 1.45 MG/DL (ref 0.55–1.02)
EGFRCR SERPLBLD CKD-EPI 2021: 38 ML/MIN/1.73M2 (ref 60–?)
ERYTHROCYTE [DISTWIDTH] IN BLOOD BY AUTOMATED COUNT: 13.9 %
GLOBULIN PLAS-MCNC: 3.1 G/DL (ref 2–3.5)
GLUCOSE BLD-MCNC: 196 MG/DL (ref 70–99)
GLUCOSE BLD-MCNC: 245 MG/DL (ref 70–99)
GLUCOSE BLD-MCNC: 67 MG/DL (ref 70–99)
HCT VFR BLD AUTO: 32 % (ref 35–48)
HGB BLD-MCNC: 10.1 G/DL (ref 12–16)
MCH RBC QN AUTO: 31.9 PG (ref 26–34)
MCHC RBC AUTO-ENTMCNC: 31.6 G/DL (ref 31–37)
MCV RBC AUTO: 100.9 FL (ref 80–100)
OSMOLALITY SERPL CALC.SUM OF ELEC: 308 MOSM/KG (ref 275–295)
PLATELET # BLD AUTO: 161 10(3)UL (ref 150–450)
POTASSIUM SERPL-SCNC: 3.2 MMOL/L (ref 3.5–5.1)
PROT SERPL-MCNC: 7 G/DL (ref 5.7–8.2)
RBC # BLD AUTO: 3.17 X10(6)UL (ref 3.8–5.3)
SODIUM SERPL-SCNC: 150 MMOL/L (ref 136–145)
WBC # BLD AUTO: 4 X10(3) UL (ref 4–11)

## 2025-08-28 PROCEDURE — 74018 RADEX ABDOMEN 1 VIEW: CPT

## 2025-08-28 PROCEDURE — 99232 SBSQ HOSP IP/OBS MODERATE 35: CPT | Performed by: HOSPITALIST

## 2025-08-28 RX ORDER — DEXTROSE MONOHYDRATE 50 MG/ML
INJECTION, SOLUTION INTRAVENOUS CONTINUOUS
Status: DISCONTINUED | OUTPATIENT
Start: 2025-08-28 | End: 2025-08-30

## 2025-08-28 RX ORDER — DIATRIZOATE MEGLUMINE AND DIATRIZOATE SODIUM 660; 100 MG/ML; MG/ML
100 SOLUTION ORAL; RECTAL
Status: COMPLETED | OUTPATIENT
Start: 2025-08-28 | End: 2025-08-28

## 2025-08-28 RX ORDER — DEXTROSE MONOHYDRATE 25 G/50ML
INJECTION, SOLUTION INTRAVENOUS
Status: COMPLETED
Start: 2025-08-28 | End: 2025-08-28

## 2025-08-29 ENCOUNTER — APPOINTMENT (OUTPATIENT)
Dept: INTERVENTIONAL RADIOLOGY/VASCULAR | Facility: HOSPITAL | Age: 75
End: 2025-08-29
Attending: HOSPITALIST

## 2025-08-29 LAB
ANION GAP SERPL CALC-SCNC: 15 MMOL/L (ref 0–18)
BUN BLD-MCNC: 13 MG/DL (ref 9–23)
CALCIUM BLD-MCNC: 8.8 MG/DL (ref 8.7–10.6)
CHLORIDE SERPL-SCNC: 108 MMOL/L (ref 98–112)
CO2 SERPL-SCNC: 20 MMOL/L (ref 21–32)
CREAT BLD-MCNC: 1.33 MG/DL (ref 0.55–1.02)
EGFRCR SERPLBLD CKD-EPI 2021: 42 ML/MIN/1.73M2 (ref 60–?)
GLUCOSE BLD-MCNC: 110 MG/DL (ref 70–99)
OSMOLALITY SERPL CALC.SUM OF ELEC: 297 MOSM/KG (ref 275–295)
POTASSIUM SERPL-SCNC: 2.8 MMOL/L (ref 3.5–5.1)
POTASSIUM SERPL-SCNC: 2.8 MMOL/L (ref 3.5–5.1)
POTASSIUM SERPL-SCNC: 4 MMOL/L (ref 3.5–5.1)
SODIUM SERPL-SCNC: 143 MMOL/L (ref 136–145)

## 2025-08-29 RX ORDER — LIDOCAINE HYDROCHLORIDE 10 MG/ML
INJECTION, SOLUTION INFILTRATION; PERINEURAL
Status: COMPLETED
Start: 2025-08-29 | End: 2025-08-29

## 2025-08-29 RX ORDER — MIDAZOLAM HYDROCHLORIDE 1 MG/ML
INJECTION INTRAMUSCULAR; INTRAVENOUS
Status: COMPLETED
Start: 2025-08-29 | End: 2025-08-29

## 2025-08-29 RX ORDER — IOPAMIDOL 612 MG/ML
30 INJECTION, SOLUTION INTRATHECAL
Status: COMPLETED | OUTPATIENT
Start: 2025-08-29 | End: 2025-08-29

## 2025-08-29 RX ORDER — POTASSIUM CHLORIDE 14.9 MG/ML
20 INJECTION INTRAVENOUS ONCE
Status: COMPLETED | OUTPATIENT
Start: 2025-08-29 | End: 2025-08-29

## 2025-08-29 RX ORDER — SODIUM BICARBONATE 650 MG/1
650 TABLET ORAL 2 TIMES DAILY
Status: SHIPPED | COMMUNITY
Start: 2025-08-29

## 2025-08-29 RX ORDER — PIPERACILLIN SODIUM, TAZOBACTAM SODIUM 3; .375 G/15ML; G/15ML
INJECTION, POWDER, LYOPHILIZED, FOR SOLUTION INTRAVENOUS
Status: COMPLETED
Start: 2025-08-29 | End: 2025-08-29

## 2025-08-30 VITALS
WEIGHT: 107.63 LBS | OXYGEN SATURATION: 97 % | SYSTOLIC BLOOD PRESSURE: 136 MMHG | DIASTOLIC BLOOD PRESSURE: 65 MMHG | BODY MASS INDEX: 20.32 KG/M2 | RESPIRATION RATE: 18 BRPM | HEIGHT: 61 IN | TEMPERATURE: 99 F | HEART RATE: 60 BPM

## (undated) NOTE — Clinical Note
TCM call completed. Dtr declined an appt at this time. Pt has an appt with Surgeon on 12/19/24. Thank you!

## (undated) NOTE — LETTER
[address]  [id number]  Consent for Procedure/Sedation  Date: 4/30/25         Time: 1000    I hereby authorize Dr Montalvo, my physician and his/her assistants (if applicable), which may include medical students, residents, and/or fellows, to perform the following surgical operation/ procedure and administer such anesthesia as may be determined necessary by my physician: RIght Nephrostomy Tube Exchange or Insertion on Anmol Gonzalez  2.   I recognize that during the surgical operation/procedure, unforeseen conditions may necessitate additional or different procedures than those listed above.  I, therefore, further authorize and request that the above-named surgeon, assistants, or designees perform such procedures as are, in their judgment, necessary and desirable.    3.   My surgeon/physician has discussed prior to my surgery the potential benefits, risks and side effects of this procedure; the likelihood of achieving goals; and potential problems that might occur during recuperation.  They also discussed reasonable alternatives to the procedure, including risks, benefits, and side effects related to the alternatives and risks related to not receiving this procedure.  I have had all my questions answered and I acknowledge that no guarantee has been made as to the result that may be obtained.    4.   Should the need arise during my operation/procedure, which includes change of level of care prior to discharge, I also consent to the administration of blood and/or blood products.  Further, I understand that despite careful testing and screening of blood or blood products by collecting agencies, I may still be subject to ill effects as a result of receiving a blood transfusion and/or blood products.  The following are some, but not all, of the potential risks that can occur: fever and allergic reactions, hemolytic reactions, transmission of diseases such as Hepatitis, AIDS and Cytomegalovirus  (CMV) and fluid overload.  In the event that I wish to have an autologous transfusion of my own blood, or a directed donor transfusion, I will discuss this with my physician.   Check only if Refusing Blood or Blood Products  I understand refusal of blood or blood products as deemed necessary by my physician may have serious consequences to my condition to include possible death. I hereby assume responsibility for my refusal and release the hospital, its personnel, and my physicians from any responsibility for the consequences of my refusal.         o  Refuse         5.   I authorize the use of any specimen, organs, tissues, body parts or foreign objects that may be removed from my body during the operation/procedure for diagnosis, research or teaching purposes and their subsequent disposal by hospital authorities.  I also authorize the release of specimen test results and/or written reports to my treating physician on the hospital medical staff or other referring or consulting physicians involved in my care, at the discretion of the Pathologist or my treating physician.    6.   I consent to the photographing or videotaping of the operations or procedures to be performed, including appropriate portions of my body for medical, scientific, or educational purposes, provided my identity is not revealed by the pictures or by descriptive texts accompanying them.  If the procedure has been photographed/videotaped, the surgeon will obtain the original picture, image, videotape or CD.  The hospital will not be responsible for storage, release or maintenance of the picture, image, tape or CD.    7.   I consent to the presence of a  or observers in the operating room as deemed necessary by my physician or their designees.    8.   I recognize that in the event my procedure results in extended X-Ray/fluoroscopy time, I may develop a skin reaction.    9. If I have a Do Not Attempt Resuscitation (DNAR) order in  place, that status will be suspended while in the operating room, procedural suite, and during the recovery period unless otherwise explicitly stated by me (or a person authorized to consent on my behalf). The surgeon or my attending physician will determine when the applicable recovery period ends for purposes of reinstating the DNAR order.  10. Patients having a sterilization procedure: I understand that if the procedure is successful the results will be permanent and it will therefore be impossible for me to inseminate, conceive, or bear children.  I also understand that the procedure is intended to result in sterility, although the result has not been guaranteed.   11. I acknowledge that my physician has explained sedation/analgesia administration to me including the risk and benefits I consent to the administration of sedation/analgesia as may be necessary or desirable in the judgment of my physician.    I CERTIFY THAT I HAVE READ AND FULLY UNDERSTAND THE ABOVE CONSENT TO OPERATION and/or OTHER PROCEDURE.        ____________________________________       _________________________________      ______________________________  Signature of Patient         Signature of Responsible Person        Printed Name of Responsible Person        ____________________________________      _________________________________      ______________________________       Signature of Witness          Relationship to Patient                       Date                                       Time  Patient Name: Anmol Gonzalez  : 2/15/1950    Reviewed: 2024   Printed: 2025  Medical Record #: [id number] Page 1 of 1